# Patient Record
Sex: FEMALE | Race: WHITE | Employment: OTHER | URBAN - METROPOLITAN AREA
[De-identification: names, ages, dates, MRNs, and addresses within clinical notes are randomized per-mention and may not be internally consistent; named-entity substitution may affect disease eponyms.]

---

## 2017-09-28 ENCOUNTER — OFFICE VISIT (OUTPATIENT)
Dept: DERMATOLOGY | Age: 77
End: 2017-09-28

## 2017-09-28 ENCOUNTER — TELEPHONE (OUTPATIENT)
Dept: DERMATOLOGY | Age: 77
End: 2017-09-28

## 2017-09-28 DIAGNOSIS — Z85.828 HISTORY OF SKIN CANCER: ICD-10-CM

## 2017-09-28 DIAGNOSIS — L57.0 AK (ACTINIC KERATOSIS): ICD-10-CM

## 2017-09-28 DIAGNOSIS — L82.1 SEBORRHEIC KERATOSIS: ICD-10-CM

## 2017-09-28 DIAGNOSIS — D48.5 NEOPLASM OF UNCERTAIN BEHAVIOR OF SKIN: ICD-10-CM

## 2017-09-28 DIAGNOSIS — D22.9 MULTIPLE NEVI: Primary | ICD-10-CM

## 2017-09-28 PROCEDURE — 99213 OFFICE O/P EST LOW 20 MIN: CPT | Performed by: DERMATOLOGY

## 2017-10-19 ENCOUNTER — OFFICE VISIT (OUTPATIENT)
Dept: DERMATOLOGY | Age: 77
End: 2017-10-19

## 2017-10-19 DIAGNOSIS — L57.0 AK (ACTINIC KERATOSIS): Primary | ICD-10-CM

## 2017-10-19 DIAGNOSIS — D48.5 NEOPLASM OF UNCERTAIN BEHAVIOR OF SKIN: ICD-10-CM

## 2017-10-19 PROCEDURE — 1036F TOBACCO NON-USER: CPT | Performed by: DERMATOLOGY

## 2017-10-19 PROCEDURE — 17000 DESTRUCT PREMALG LESION: CPT | Performed by: DERMATOLOGY

## 2017-10-19 PROCEDURE — 11100 PR BIOPSY OF SKIN LESION: CPT | Performed by: DERMATOLOGY

## 2017-10-19 NOTE — PROGRESS NOTES
[facility]  Gabo Lee MD  [phone]      Da Trinidad  1940    77 y.o. female     Date of Visit: 10/19/2017    Chief Complaint: AK's, lesions  Chief Complaint   Patient presents with    Biopsy     lt jawline     Last seen:     History of Present Illness:    Had FSE at last visit  for Hx of NMSC ([address] on the L medial cheek taken off with Mohs). 1. Here for trx of a rough lesion on the R nasal sidewall. Asx.     2. Also here for bx of an enlarging papule on the L jawline. Neither of these was treated or biopsied at last visit because she was feeling poorly at the time. Bx's ():  Chest-actinic keratosis  Lt post thigh-benign    Derm Hx:  Hx of intermittent eyelid pruritus and scaling, previously diagnosed as dermatitis - cleared with eliveronica in the past.     Friend of Amina Aly and   Nikki Riley. Her grandson was killed in a car wreck in  ( football player). Review of Systems:  Gen: Feels well, good sense of health. Skin: No changing moles or lesions. No new rashes. Neuro: has a migraine today    Past Medical History, Family History, Surgical History, Medications and Allergies reviewed. Past Medical History:   Diagnosis Date    Back pain     Headache     Hypothyroidism     Migraines     Thyroid disease     UTI (lower urinary tract infection)     treated w/Bactrim per PT.         Past Surgical History:   Procedure Laterality Date    BLADDER SURGERY  2016    restructured bladder        Outpatient Prescriptions Marked as Taking for the 10/19/17 encounter (Office Visit) with Terry Matute MD   Medication Sig Dispense Refill    ketorolac (TORADOL) 30 MG/ML injection       venlafaxine (EFFEXOR-XR) 150 MG XR capsule Take 225 mg by mouth daily Indications: Per PT 3/14/2016      Diclofenac Potassium (CAMBIA PO) Take by mouth      SYNTHROID 112 MCG tablet       trimethoprim (TRIMPEX) 100 MG tablet       ALPRAZolam (XANAX) 0.5 MG tablet Take 0.5 mg by mouth nightly as needed.  Calcium Carbonate-Vit D-Min 600-200 MG-UNIT TABS Take 2,000 Units by mouth. No Known Allergies      Physical Examination     Gen, well-appearing  The following were examined and determined to be normal: Psych/Neuro  The following were examined and determined to be abnormal: face    R nasal sidewall with erythematous roughly scaled macule   L jawline with lobulated telangiectatic yellowish papule     Assessment and Plan     1. AK - R nasal sidewall  - 1 lesion(s) on the nose treated with liquid nitrogen x 2 cycles. Patient educated on risk of blister, hypopigmentation/scar and wound care. 2. R/o BCC vs [address] - L jawline, near the chin  - Shave biopsy performed after verbal consent obtained. Patient educated regarding risk of bleeding, infection, scar and educated on wound care. Skin cleansed with alcohol pad and site anesthetized with lido + epi. Aluminum chloride applied to site for hemostasis. Petrolatum ointment and bandage applied. Specimen bottle labeled with patient information and site and specimen sent to dermpath. F/u yearly.

## 2017-10-19 NOTE — PATIENT INSTRUCTIONS
Cryosurgery (Freezing) Wound Care Instructions    AFTER THE PROCEDURE:    You will notice swelling and redness around the site. This is normal.    You may experience a sharp or sore feeling for the next several days. For this discomfort, you may take acetaminophen (Tylenol©).  A blister may develop at the treated area, sometimes as soon as by the end of the day. After several days, the blister will subside and a scab will form.  If the area is bumped or traumatized during the first few days following freezing, you may develop bleeding into the blister, forming a blood blister. This is nothing to be alarmed about.  If the blister is tense, uncomfortable, or much larger than the site that was frozen, you may pop the blister along its edge with a sterile needle (boiled, heated under a flame, or cleaned with alcohol) to allow the fluid to drain out. If the blister does not bother you, no treatment is needed.  Do NOT peel off the top of the blister roof. It will act as a dressing on top of your wound. WOUND CARE:    You may shower or bathe as usual, but avoid scrubbing the areas that have been frozen.  Cleanse the site twice a day with mild soapy water, and then apply a thin film of white petrolatum (Vaseline©).  You do not need to cover the area, but can if you prefer.  Do NOT allow the site to become dry or crusted, or attempt to dry it out with rubbing alcohol or hydrogen peroxide.  Continue this regimen until the area is pink and healed. Depending on the size and location of your cryosurgery site, healing may take 2 to 4 weeks.  The area may continue to be pink for several weeks, and over the next few months may become darker or lighter than the surrounding skin. This may be a permanent change. Biopsy Wound Care Instructions    · Keep the bandage in place for 24 hours. · Cleanse the wound with mild soapy water daily   Gently dry the area.    Apply Vaseline or petroleum jelly to the wound using a cotton tipped applicator.  Cover with a clean bandage.  Repeat this process until the biopsy site is healed.  If you had stitches placed, continue treating the site until the stitches are removed. Remember to make an appointment to return to have your stitches removed by our staff.  You may shower and bathe as usual.       ** Biopsy results generally take around 7 business days to come back. If you have not heard from us by then, please call the office at [phone] between 8AM and 4PM Monday through Friday.

## 2017-10-25 ENCOUNTER — TELEPHONE (OUTPATIENT)
Dept: DERMATOLOGY | Age: 77
End: 2017-10-25

## 2018-06-21 ENCOUNTER — OFFICE VISIT (OUTPATIENT)
Dept: DERMATOLOGY | Age: 78
End: 2018-06-21

## 2018-06-21 DIAGNOSIS — D48.5 NEOPLASM OF UNCERTAIN BEHAVIOR OF SKIN: ICD-10-CM

## 2018-06-21 DIAGNOSIS — D22.9 MULTIPLE NEVI: Primary | ICD-10-CM

## 2018-06-21 DIAGNOSIS — Z85.828 HISTORY OF NONMELANOMA SKIN CANCER: ICD-10-CM

## 2018-06-21 DIAGNOSIS — L57.0 AK (ACTINIC KERATOSIS): ICD-10-CM

## 2018-06-21 DIAGNOSIS — L82.1 SEBORRHEIC KERATOSIS: ICD-10-CM

## 2018-06-21 PROCEDURE — G8421 BMI NOT CALCULATED: HCPCS | Performed by: DERMATOLOGY

## 2018-06-21 PROCEDURE — G8427 DOCREV CUR MEDS BY ELIG CLIN: HCPCS | Performed by: DERMATOLOGY

## 2018-06-21 PROCEDURE — 4040F PNEUMOC VAC/ADMIN/RCVD: CPT | Performed by: DERMATOLOGY

## 2018-06-21 PROCEDURE — 11100 PR BIOPSY OF SKIN LESION: CPT | Performed by: DERMATOLOGY

## 2018-06-21 PROCEDURE — 1036F TOBACCO NON-USER: CPT | Performed by: DERMATOLOGY

## 2018-06-21 PROCEDURE — 1090F PRES/ABSN URINE INCON ASSESS: CPT | Performed by: DERMATOLOGY

## 2018-06-21 PROCEDURE — 99213 OFFICE O/P EST LOW 20 MIN: CPT | Performed by: DERMATOLOGY

## 2018-06-21 PROCEDURE — 1123F ACP DISCUSS/DSCN MKR DOCD: CPT | Performed by: DERMATOLOGY

## 2018-06-21 PROCEDURE — 17000 DESTRUCT PREMALG LESION: CPT | Performed by: DERMATOLOGY

## 2018-06-21 PROCEDURE — G8400 PT W/DXA NO RESULTS DOC: HCPCS | Performed by: DERMATOLOGY

## 2018-06-28 ENCOUNTER — TELEPHONE (OUTPATIENT)
Dept: DERMATOLOGY | Age: 78
End: 2018-06-28

## 2018-11-27 ENCOUNTER — TELEPHONE (OUTPATIENT)
Dept: DERMATOLOGY | Age: 78
End: 2018-11-27

## 2018-12-18 ENCOUNTER — OFFICE VISIT (OUTPATIENT)
Dept: DERMATOLOGY | Age: 78
End: 2018-12-18
Payer: MEDICARE

## 2018-12-18 DIAGNOSIS — D48.5 NEOPLASM OF UNCERTAIN BEHAVIOR OF SKIN: Primary | ICD-10-CM

## 2018-12-18 PROCEDURE — 11100 PR BIOPSY OF SKIN LESION: CPT | Performed by: DERMATOLOGY

## 2018-12-18 PROCEDURE — 11101 PR BIOPSY, EACH ADDED LESION: CPT | Performed by: DERMATOLOGY

## 2018-12-18 NOTE — PROGRESS NOTES
[facility]  Myrtle Mcelroy MD  [phone]      Errussel Jobs  1940    69 y.o. female     Date of Visit: 2018    Chief Complaint: lesions  Chief Complaint   Patient presents with    Skin Lesion     rt jawline x 2 and lt preauricular     Last seen:     History of Present Illness:    Here for a few new concerning lesions that have grown over the past few mos. R jawline x 2 - anterior lesion is tender  L preauricular - focally tender as well    Hx of NMSC (BCC on the L medial cheek taken off with Mohs)     Bx's ():  Chest-actinic keratosis  Lt post thigh-benign    Derm Hx:  Hx of intermittent eyelid pruritus and scaling, previously diagnosed as dermatitis - cleared with elidel in the past.     Friend of Faith Sharma and   Primitivo Haskins. Her grandson was killed in a car wreck in  ( football player). Review of Systems:  Gen: Feels well, good sense of health. Past Medical History, Family History, Surgical History, Medications and Allergies reviewed. Past Medical History:   Diagnosis Date    Back pain     Headache     Hypothyroidism     Migraines     Thyroid disease     UTI (lower urinary tract infection)     treated w/Bactrim per PT. Past Surgical History:   Procedure Laterality Date    BLADDER SURGERY  2016    restructured bladder        Outpatient Prescriptions Marked as Taking for the 18 encounter (Office Visit) with Nikki Almanza MD   Medication Sig Dispense Refill    ketorolac (TORADOL) 30 MG/ML injection       venlafaxine (EFFEXOR-XR) 150 MG XR capsule Take 225 mg by mouth daily Indications: Per PT 3/14/2016      Diclofenac Potassium (CAMBIA PO) Take by mouth      SYNTHROID 112 MCG tablet       trimethoprim (TRIMPEX) 100 MG tablet       ALPRAZolam (XANAX) 0.5 MG tablet Take 0.5 mg by mouth nightly as needed.  Calcium Carbonate-Vit D-Min 600-200 MG-UNIT TABS Take 2,000 Units by mouth.

## 2018-12-26 DIAGNOSIS — C44.92 SCC (SQUAMOUS CELL CARCINOMA): Primary | ICD-10-CM

## 2019-01-10 ENCOUNTER — PROCEDURE VISIT (OUTPATIENT)
Dept: SURGERY | Age: 79
End: 2019-01-10
Payer: MEDICARE

## 2019-01-10 VITALS
WEIGHT: 190 LBS | OXYGEN SATURATION: 96 % | TEMPERATURE: 97.9 F | SYSTOLIC BLOOD PRESSURE: 132 MMHG | BODY MASS INDEX: 32.61 KG/M2 | DIASTOLIC BLOOD PRESSURE: 85 MMHG | HEART RATE: 78 BPM

## 2019-01-10 DIAGNOSIS — C44.329 SQUAMOUS CELL CANCER OF SKIN OF JAWLINE: Primary | ICD-10-CM

## 2019-01-10 PROCEDURE — 17311 MOHS 1 STAGE H/N/HF/G: CPT | Performed by: DERMATOLOGY

## 2019-01-10 PROCEDURE — 12052 INTMD RPR FACE/MM 2.6-5.0 CM: CPT | Performed by: DERMATOLOGY

## 2019-01-11 ENCOUNTER — TELEPHONE (OUTPATIENT)
Dept: SURGERY | Age: 79
End: 2019-01-11

## 2019-03-25 LAB — DERMATOLOGY PATHOLOGY REPORT: ABNORMAL

## 2019-06-20 ENCOUNTER — OFFICE VISIT (OUTPATIENT)
Dept: DERMATOLOGY | Age: 79
End: 2019-06-20
Payer: MEDICARE

## 2019-06-20 DIAGNOSIS — L82.0 INFLAMED SEBORRHEIC KERATOSIS: ICD-10-CM

## 2019-06-20 DIAGNOSIS — Z85.828 HISTORY OF NONMELANOMA SKIN CANCER: ICD-10-CM

## 2019-06-20 DIAGNOSIS — L82.1 SEBORRHEIC KERATOSIS: ICD-10-CM

## 2019-06-20 DIAGNOSIS — D22.9 MULTIPLE NEVI: Primary | ICD-10-CM

## 2019-06-20 DIAGNOSIS — L57.0 AK (ACTINIC KERATOSIS): ICD-10-CM

## 2019-06-20 PROCEDURE — 1036F TOBACCO NON-USER: CPT | Performed by: DERMATOLOGY

## 2019-06-20 PROCEDURE — 1123F ACP DISCUSS/DSCN MKR DOCD: CPT | Performed by: DERMATOLOGY

## 2019-06-20 PROCEDURE — G8400 PT W/DXA NO RESULTS DOC: HCPCS | Performed by: DERMATOLOGY

## 2019-06-20 PROCEDURE — G8427 DOCREV CUR MEDS BY ELIG CLIN: HCPCS | Performed by: DERMATOLOGY

## 2019-06-20 PROCEDURE — 99213 OFFICE O/P EST LOW 20 MIN: CPT | Performed by: DERMATOLOGY

## 2019-06-20 PROCEDURE — 4040F PNEUMOC VAC/ADMIN/RCVD: CPT | Performed by: DERMATOLOGY

## 2019-06-20 PROCEDURE — G8419 CALC BMI OUT NRM PARAM NOF/U: HCPCS | Performed by: DERMATOLOGY

## 2019-06-20 PROCEDURE — 1090F PRES/ABSN URINE INCON ASSESS: CPT | Performed by: DERMATOLOGY

## 2019-06-20 PROCEDURE — 17110 DESTRUCTION B9 LES UP TO 14: CPT | Performed by: DERMATOLOGY

## 2019-06-20 RX ORDER — CALCIUM CARBONATE 500(1250)
500 TABLET ORAL DAILY
COMMUNITY

## 2019-06-20 RX ORDER — CHLORAL HYDRATE 500 MG
3000 CAPSULE ORAL 3 TIMES DAILY
COMMUNITY

## 2019-10-21 ENCOUNTER — OFFICE VISIT (OUTPATIENT)
Dept: DERMATOLOGY | Age: 79
End: 2019-10-21
Payer: MEDICARE

## 2019-10-21 DIAGNOSIS — L82.0 INFLAMED SEBORRHEIC KERATOSIS: Primary | ICD-10-CM

## 2019-10-21 PROCEDURE — 17111 DESTRUCTION B9 LESIONS 15/>: CPT | Performed by: DERMATOLOGY

## 2019-10-21 PROCEDURE — 11306 SHAVE SKIN LESION 0.6-1.0 CM: CPT | Performed by: DERMATOLOGY

## 2019-10-21 PROCEDURE — 11312 SHAVE SKIN LESION 1.1-2.0 CM: CPT | Performed by: DERMATOLOGY

## 2019-10-23 LAB — DERMATOLOGY PATHOLOGY REPORT: NORMAL

## 2020-03-13 ENCOUNTER — TELEPHONE (OUTPATIENT)
Dept: DERMATOLOGY | Age: 80
End: 2020-03-13

## 2020-03-17 NOTE — TELEPHONE ENCOUNTER
Spoke to patient-started little, but has increased in size. The lesion was explained to me the half size of a dime, skin tone, not bleeding or painful. Patient has an appointment in June 2020.

## 2020-07-13 ENCOUNTER — OFFICE VISIT (OUTPATIENT)
Dept: DERMATOLOGY | Age: 80
End: 2020-07-13
Payer: MEDICARE

## 2020-07-13 VITALS — TEMPERATURE: 97.5 F

## 2020-07-13 PROCEDURE — 1123F ACP DISCUSS/DSCN MKR DOCD: CPT | Performed by: DERMATOLOGY

## 2020-07-13 PROCEDURE — 1036F TOBACCO NON-USER: CPT | Performed by: DERMATOLOGY

## 2020-07-13 PROCEDURE — 4040F PNEUMOC VAC/ADMIN/RCVD: CPT | Performed by: DERMATOLOGY

## 2020-07-13 PROCEDURE — 1090F PRES/ABSN URINE INCON ASSESS: CPT | Performed by: DERMATOLOGY

## 2020-07-13 PROCEDURE — G8421 BMI NOT CALCULATED: HCPCS | Performed by: DERMATOLOGY

## 2020-07-13 PROCEDURE — G8427 DOCREV CUR MEDS BY ELIG CLIN: HCPCS | Performed by: DERMATOLOGY

## 2020-07-13 PROCEDURE — 99213 OFFICE O/P EST LOW 20 MIN: CPT | Performed by: DERMATOLOGY

## 2020-07-13 PROCEDURE — G8400 PT W/DXA NO RESULTS DOC: HCPCS | Performed by: DERMATOLOGY

## 2020-07-13 PROCEDURE — 17110 DESTRUCTION B9 LES UP TO 14: CPT | Performed by: DERMATOLOGY

## 2020-07-13 PROCEDURE — 11102 TANGNTL BX SKIN SINGLE LES: CPT | Performed by: DERMATOLOGY

## 2020-07-13 NOTE — PROGRESS NOTES
[facility]  Whitney Flynn MD  [phone]      [place]  1940    67 y.o. female     Date of Visit: 2020    Chief Complaint: f/u skin cancer, AK's, lesions  Chief Complaint   Patient presents with    Skin Exam     spot on upper chest left side of neck     Last seen:     History of Present Illness:    1. She is here for evaluation of multiple asx pigmented lesions on the trunk and extremities, present for many years; no change in size/shape/color of any lesions; no bleeding lesions. Firm lesion on the shoulder x many years - asx. 2. Hx of NMSC (BCC on the L medial cheek treated with Mohs and more recently SCC on the R jawline that was treated with Mohs by Dr. Amy Ortega ) - Here for f/u and skin check. No concerns with previous sites or new lesions noted except for noted below. 3. Hx of AK. No new concerns. 4. C/o irritated and itchy lesion on the  L neck. 5. She has a concerning pink lesion on the R upper FH - bled a few weeks ago. Asx. Bx's ():  Chest-actinic keratosis  Lt post thigh-benign    Derm Hx:  Hx of intermittent eyelid pruritus and scaling, previously diagnosed as dermatitis - cleared with eliveronica in the past.     Friend of Mirian Gee and   [place]. Her grandson was killed in a car wreck in  ( football player). Review of Systems:  Gen: Feels well, good sense of health. Skin: No changing moles or lesions. No new rashes. Past Medical History, Family History, Surgical History, Medications and Allergies reviewed. Past Medical History:   Diagnosis Date    Back pain     Cancer ([facility] [address]     squamous cell carcinoma and basal cell carcinoma    Headache     Hypothyroidism     Migraines     Thyroid disease     UTI (lower urinary tract infection)     treated w/Bactrim per PT.         Past Surgical History:   Procedure Laterality Date    BLADDER SURGERY  2016    restructured bladder     MOHS SURGERY  01/10/2019    right jawline anterior inferior       Outpatient Medications Marked as Taking for the 7/13/20 encounter (Office Visit) with Kevin De La Cruz MD   Medication Sig Dispense Refill    aspirin 81 MG tablet Take 81 mg by mouth daily      ketorolac (TORADOL) 30 MG/ML injection       venlafaxine (EFFEXOR-XR) 150 MG XR capsule Take 225 mg by mouth daily Indications: Per PT 3/14/2016      SYNTHROID 112 MCG tablet       Calcium Carbonate-Vit D-Min 600-200 MG-UNIT TABS Take 2,000 Units by mouth. No Known Allergies      Physical Examination     Gen, well-appearing  The following were examined and determined to be normal: Psych/Neuro, Scalp/hair, Conjunctivae/eyelids, Gums/teeth/lips, Abdomen, LLE, Nails/digits and Groin/buttocks. Neck, Breast/axilla/chest, and LUE and RUE.  RLE, back  The following were examined and determined to be abnormal: face    trunk and extremities with scattered brown macules and papules   Trunk, arms with stuck-on dull-surfaced pinkish-tan crusted papules  Scar appears clear  No AK's  Neck with stuck-on dull-surfaced brown papule  R upper lateral FH with shiny pinkish and tan sclerotic macule  L shoulder with firm dermal pinkish-brown papule       Assessment and Plan     1. Benign-appearing nevi and SK's and DF on the L shoulder  - educ re ABCD's of MM   educ sun protection   encouraged skin check yearly (sooner if indicated), self checks     2. Hx of NMSC, no signs recurrence  - educ sun protection   encouraged skin check yearly (sooner if indicated), self checks    3. Hx of AK - clear today  - sun proection    4. ISK's - 1 on the L neck  -1 lesion on the hairline - lesion(s) on the frontal hairline treated with liquid nitrogen x 2 cycles. Patient educated on risk of blister, hypopigmentation/scar and wound care.    - rtn for removal in the fall/winter - neck and infamammary - LN2 vs shave/ED    5. R/o BCC - R upper lateral FH  - Shave biopsy performed after verbal consent obtained. Patient educated regarding risk of bleeding, infection, scar and educated on wound care. Skin cleansed with alcohol pad and site anesthetized with lido + epi. Aluminum chloride applied to site for hemostasis. Petrolatum ointment and bandage applied. Specimen bottle labeled with patient information and site and specimen sent to dermpath. F/u yearly.

## 2020-07-15 LAB — DERMATOLOGY PATHOLOGY REPORT: NORMAL

## 2020-10-27 LAB — SARS-COV-2: NORMAL

## 2020-10-31 ENCOUNTER — HOSPITAL ENCOUNTER (INPATIENT)
Age: 80
LOS: 13 days | Discharge: HOME HEALTH | DRG: 871 | End: 2020-11-13
Attending: STUDENT IN AN ORGANIZED HEALTH CARE EDUCATION/TRAINING PROGRAM | Admitting: INTERNAL MEDICINE
Payer: MEDICARE

## 2020-10-31 ENCOUNTER — APPOINTMENT (OUTPATIENT)
Dept: GENERAL RADIOLOGY | Age: 80
DRG: 871 | End: 2020-10-31
Payer: MEDICARE

## 2020-10-31 PROBLEM — U07.1 COVID-19: Status: ACTIVE | Noted: 2020-10-31

## 2020-10-31 PROBLEM — E03.9 HYPOTHYROID: Status: ACTIVE | Noted: 2020-10-31

## 2020-10-31 PROBLEM — J96.00 ACUTE RESPIRATORY FAILURE DUE TO COVID-19 (HCC): Status: ACTIVE | Noted: 2020-10-31

## 2020-10-31 PROBLEM — F32.A DEPRESSION: Status: ACTIVE | Noted: 2020-10-31

## 2020-10-31 PROBLEM — U07.1 ACUTE RESPIRATORY FAILURE DUE TO COVID-19 (HCC): Status: ACTIVE | Noted: 2020-10-31

## 2020-10-31 LAB
ANION GAP SERPL CALCULATED.3IONS-SCNC: 14 MMOL/L (ref 3–16)
APTT: 28.6 SEC (ref 24.2–36.2)
BASOPHILS ABSOLUTE: 0 K/UL (ref 0–0.2)
BASOPHILS RELATIVE PERCENT: 0.2 %
BILIRUBIN URINE: NEGATIVE
BLOOD, URINE: NEGATIVE
BUN BLDV-MCNC: 10 MG/DL (ref 7–20)
CALCIUM SERPL-MCNC: 8.3 MG/DL (ref 8.3–10.6)
CHLORIDE BLD-SCNC: 101 MMOL/L (ref 99–110)
CLARITY: CLEAR
CO2: 23 MMOL/L (ref 21–32)
COLOR: YELLOW
CREAT SERPL-MCNC: 0.5 MG/DL (ref 0.6–1.2)
D DIMER: 415 NG/ML DDU (ref 0–229)
EOSINOPHILS ABSOLUTE: 0 K/UL (ref 0–0.6)
EOSINOPHILS RELATIVE PERCENT: 0.2 %
EPITHELIAL CELLS, UA: 6 /HPF (ref 0–5)
GFR AFRICAN AMERICAN: >60
GFR NON-AFRICAN AMERICAN: >60
GLUCOSE BLD-MCNC: 126 MG/DL (ref 70–99)
GLUCOSE URINE: NEGATIVE MG/DL
HCT VFR BLD CALC: 41.2 % (ref 36–48)
HEMOGLOBIN: 14 G/DL (ref 12–16)
HYALINE CASTS: 2 /LPF (ref 0–8)
INR BLD: 1.1 (ref 0.86–1.14)
KETONES, URINE: 40 MG/DL
LACTIC ACID: 0.9 MMOL/L (ref 0.4–2)
LEUKOCYTE ESTERASE, URINE: NEGATIVE
LYMPHOCYTES ABSOLUTE: 0.7 K/UL (ref 1–5.1)
LYMPHOCYTES RELATIVE PERCENT: 14.2 %
MCH RBC QN AUTO: 30.6 PG (ref 26–34)
MCHC RBC AUTO-ENTMCNC: 33.9 G/DL (ref 31–36)
MCV RBC AUTO: 90.2 FL (ref 80–100)
MICROSCOPIC EXAMINATION: YES
MONOCYTES ABSOLUTE: 0.3 K/UL (ref 0–1.3)
MONOCYTES RELATIVE PERCENT: 5.9 %
NEUTROPHILS ABSOLUTE: 3.8 K/UL (ref 1.7–7.7)
NEUTROPHILS RELATIVE PERCENT: 79.5 %
NITRITE, URINE: NEGATIVE
PDW BLD-RTO: 13.3 % (ref 12.4–15.4)
PH UA: 6.5 (ref 5–8)
PLATELET # BLD: 170 K/UL (ref 135–450)
PMV BLD AUTO: 7.4 FL (ref 5–10.5)
POTASSIUM SERPL-SCNC: 3.4 MMOL/L (ref 3.5–5.1)
PRO-BNP: 175 PG/ML (ref 0–449)
PROTEIN UA: ABNORMAL MG/DL
PROTHROMBIN TIME: 12.8 SEC (ref 10–13.2)
RAPID INFLUENZA  B AGN: NEGATIVE
RAPID INFLUENZA A AGN: NEGATIVE
RBC # BLD: 4.57 M/UL (ref 4–5.2)
RBC UA: 2 /HPF (ref 0–4)
SODIUM BLD-SCNC: 138 MMOL/L (ref 136–145)
SPECIFIC GRAVITY UA: 1.01 (ref 1–1.03)
TROPONIN: <0.01 NG/ML
URINE REFLEX TO CULTURE: ABNORMAL
URINE TYPE: ABNORMAL
UROBILINOGEN, URINE: 0.2 E.U./DL
WBC # BLD: 4.8 K/UL (ref 4–11)
WBC UA: 2 /HPF (ref 0–5)

## 2020-10-31 PROCEDURE — 36415 COLL VENOUS BLD VENIPUNCTURE: CPT

## 2020-10-31 PROCEDURE — 83880 ASSAY OF NATRIURETIC PEPTIDE: CPT

## 2020-10-31 PROCEDURE — 6360000002 HC RX W HCPCS: Performed by: NURSE PRACTITIONER

## 2020-10-31 PROCEDURE — 6370000000 HC RX 637 (ALT 250 FOR IP): Performed by: INTERNAL MEDICINE

## 2020-10-31 PROCEDURE — 1200000000 HC SEMI PRIVATE

## 2020-10-31 PROCEDURE — 80048 BASIC METABOLIC PNL TOTAL CA: CPT

## 2020-10-31 PROCEDURE — 83605 ASSAY OF LACTIC ACID: CPT

## 2020-10-31 PROCEDURE — 87040 BLOOD CULTURE FOR BACTERIA: CPT

## 2020-10-31 PROCEDURE — 85379 FIBRIN DEGRADATION QUANT: CPT

## 2020-10-31 PROCEDURE — 94640 AIRWAY INHALATION TREATMENT: CPT

## 2020-10-31 PROCEDURE — 2700000000 HC OXYGEN THERAPY PER DAY

## 2020-10-31 PROCEDURE — 71045 X-RAY EXAM CHEST 1 VIEW: CPT

## 2020-10-31 PROCEDURE — 84484 ASSAY OF TROPONIN QUANT: CPT

## 2020-10-31 PROCEDURE — 2580000003 HC RX 258: Performed by: NURSE PRACTITIONER

## 2020-10-31 PROCEDURE — 99285 EMERGENCY DEPT VISIT HI MDM: CPT

## 2020-10-31 PROCEDURE — 85610 PROTHROMBIN TIME: CPT

## 2020-10-31 PROCEDURE — 6370000000 HC RX 637 (ALT 250 FOR IP): Performed by: NURSE PRACTITIONER

## 2020-10-31 PROCEDURE — 87804 INFLUENZA ASSAY W/OPTIC: CPT

## 2020-10-31 PROCEDURE — 81001 URINALYSIS AUTO W/SCOPE: CPT

## 2020-10-31 PROCEDURE — 2580000003 HC RX 258: Performed by: INTERNAL MEDICINE

## 2020-10-31 PROCEDURE — 85025 COMPLETE CBC W/AUTO DIFF WBC: CPT

## 2020-10-31 PROCEDURE — 93005 ELECTROCARDIOGRAM TRACING: CPT | Performed by: STUDENT IN AN ORGANIZED HEALTH CARE EDUCATION/TRAINING PROGRAM

## 2020-10-31 PROCEDURE — 96374 THER/PROPH/DIAG INJ IV PUSH: CPT

## 2020-10-31 PROCEDURE — 85730 THROMBOPLASTIN TIME PARTIAL: CPT

## 2020-10-31 PROCEDURE — 94761 N-INVAS EAR/PLS OXIMETRY MLT: CPT

## 2020-10-31 RX ORDER — CHLORAL HYDRATE 500 MG
3000 CAPSULE ORAL 3 TIMES DAILY
Status: DISCONTINUED | OUTPATIENT
Start: 2020-10-31 | End: 2020-10-31

## 2020-10-31 RX ORDER — ALPRAZOLAM 0.5 MG/1
0.5 TABLET ORAL NIGHTLY PRN
Status: DISCONTINUED | OUTPATIENT
Start: 2020-10-31 | End: 2020-11-13 | Stop reason: HOSPADM

## 2020-10-31 RX ORDER — ALBUTEROL SULFATE 90 UG/1
2 AEROSOL, METERED RESPIRATORY (INHALATION) 4 TIMES DAILY
Status: DISCONTINUED | OUTPATIENT
Start: 2020-11-01 | End: 2020-11-13 | Stop reason: HOSPADM

## 2020-10-31 RX ORDER — DEXAMETHASONE SODIUM PHOSPHATE 10 MG/ML
6 INJECTION, SOLUTION INTRAMUSCULAR; INTRAVENOUS EVERY 24 HOURS
Status: DISCONTINUED | OUTPATIENT
Start: 2020-11-01 | End: 2020-11-01

## 2020-10-31 RX ORDER — LEVOTHYROXINE SODIUM 112 UG/1
112 TABLET ORAL DAILY
Status: DISCONTINUED | OUTPATIENT
Start: 2020-11-01 | End: 2020-11-13 | Stop reason: HOSPADM

## 2020-10-31 RX ORDER — POTASSIUM CHLORIDE 20 MEQ/1
40 TABLET, EXTENDED RELEASE ORAL PRN
Status: DISCONTINUED | OUTPATIENT
Start: 2020-10-31 | End: 2020-11-13 | Stop reason: HOSPADM

## 2020-10-31 RX ORDER — DEXAMETHASONE SODIUM PHOSPHATE 10 MG/ML
10 INJECTION, SOLUTION INTRAMUSCULAR; INTRAVENOUS ONCE
Status: COMPLETED | OUTPATIENT
Start: 2020-10-31 | End: 2020-10-31

## 2020-10-31 RX ORDER — POTASSIUM CHLORIDE 7.45 MG/ML
10 INJECTION INTRAVENOUS PRN
Status: DISCONTINUED | OUTPATIENT
Start: 2020-10-31 | End: 2020-11-13 | Stop reason: HOSPADM

## 2020-10-31 RX ORDER — PROMETHAZINE HYDROCHLORIDE 25 MG/1
12.5 TABLET ORAL EVERY 6 HOURS PRN
Status: DISCONTINUED | OUTPATIENT
Start: 2020-10-31 | End: 2020-11-13 | Stop reason: HOSPADM

## 2020-10-31 RX ORDER — ALBUTEROL SULFATE 90 UG/1
2 AEROSOL, METERED RESPIRATORY (INHALATION) EVERY 6 HOURS PRN
Status: DISCONTINUED | OUTPATIENT
Start: 2020-10-31 | End: 2020-11-13 | Stop reason: HOSPADM

## 2020-10-31 RX ORDER — SODIUM CHLORIDE 9 MG/ML
INJECTION, SOLUTION INTRAVENOUS CONTINUOUS
Status: DISCONTINUED | OUTPATIENT
Start: 2020-10-31 | End: 2020-11-01

## 2020-10-31 RX ORDER — ONDANSETRON 2 MG/ML
4 INJECTION INTRAMUSCULAR; INTRAVENOUS EVERY 6 HOURS PRN
Status: DISCONTINUED | OUTPATIENT
Start: 2020-10-31 | End: 2020-11-13 | Stop reason: HOSPADM

## 2020-10-31 RX ORDER — ALBUTEROL SULFATE 90 UG/1
2 AEROSOL, METERED RESPIRATORY (INHALATION) 4 TIMES DAILY
Status: DISCONTINUED | OUTPATIENT
Start: 2020-10-31 | End: 2020-10-31

## 2020-10-31 RX ORDER — ACETAMINOPHEN 325 MG/1
650 TABLET ORAL EVERY 6 HOURS PRN
Status: DISCONTINUED | OUTPATIENT
Start: 2020-10-31 | End: 2020-11-13 | Stop reason: HOSPADM

## 2020-10-31 RX ORDER — IPRATROPIUM BROMIDE AND ALBUTEROL SULFATE 2.5; .5 MG/3ML; MG/3ML
1 SOLUTION RESPIRATORY (INHALATION)
Status: DISCONTINUED | OUTPATIENT
Start: 2020-11-01 | End: 2020-10-31 | Stop reason: ALTCHOICE

## 2020-10-31 RX ORDER — 0.9 % SODIUM CHLORIDE 0.9 %
1000 INTRAVENOUS SOLUTION INTRAVENOUS ONCE
Status: COMPLETED | OUTPATIENT
Start: 2020-10-31 | End: 2020-10-31

## 2020-10-31 RX ORDER — SODIUM CHLORIDE 0.9 % (FLUSH) 0.9 %
10 SYRINGE (ML) INJECTION EVERY 12 HOURS SCHEDULED
Status: DISCONTINUED | OUTPATIENT
Start: 2020-10-31 | End: 2020-11-13 | Stop reason: HOSPADM

## 2020-10-31 RX ORDER — SODIUM CHLORIDE 0.9 % (FLUSH) 0.9 %
10 SYRINGE (ML) INJECTION PRN
Status: DISCONTINUED | OUTPATIENT
Start: 2020-10-31 | End: 2020-11-13 | Stop reason: HOSPADM

## 2020-10-31 RX ORDER — ASPIRIN 81 MG/1
81 TABLET ORAL DAILY
Status: DISCONTINUED | OUTPATIENT
Start: 2020-11-01 | End: 2020-11-13 | Stop reason: HOSPADM

## 2020-10-31 RX ORDER — CALCIUM CARBONATE 500(1250)
500 TABLET ORAL DAILY
Status: DISCONTINUED | OUTPATIENT
Start: 2020-11-01 | End: 2020-11-13 | Stop reason: HOSPADM

## 2020-10-31 RX ORDER — 0.9 % SODIUM CHLORIDE 0.9 %
500 INTRAVENOUS SOLUTION INTRAVENOUS PRN
Status: DISCONTINUED | OUTPATIENT
Start: 2020-10-31 | End: 2020-11-13 | Stop reason: HOSPADM

## 2020-10-31 RX ORDER — ACETAMINOPHEN 325 MG/1
650 TABLET ORAL ONCE
Status: COMPLETED | OUTPATIENT
Start: 2020-10-31 | End: 2020-10-31

## 2020-10-31 RX ORDER — ACETAMINOPHEN 650 MG/1
650 SUPPOSITORY RECTAL EVERY 6 HOURS PRN
Status: DISCONTINUED | OUTPATIENT
Start: 2020-10-31 | End: 2020-11-13 | Stop reason: HOSPADM

## 2020-10-31 RX ORDER — TRIMETHOPRIM 100 MG/1
100 TABLET ORAL DAILY
Status: DISCONTINUED | OUTPATIENT
Start: 2020-11-01 | End: 2020-10-31

## 2020-10-31 RX ORDER — LABETALOL HYDROCHLORIDE 5 MG/ML
10 INJECTION, SOLUTION INTRAVENOUS EVERY 6 HOURS PRN
Status: DISCONTINUED | OUTPATIENT
Start: 2020-10-31 | End: 2020-11-13 | Stop reason: HOSPADM

## 2020-10-31 RX ADMIN — SODIUM CHLORIDE: 9 INJECTION, SOLUTION INTRAVENOUS at 23:48

## 2020-10-31 RX ADMIN — Medication 2 PUFF: at 17:48

## 2020-10-31 RX ADMIN — Medication 1 G: at 17:28

## 2020-10-31 RX ADMIN — Medication 10 ML: at 23:50

## 2020-10-31 RX ADMIN — SODIUM CHLORIDE 1000 ML: 9 INJECTION, SOLUTION INTRAVENOUS at 15:52

## 2020-10-31 RX ADMIN — AZITHROMYCIN MONOHYDRATE 500 MG: 500 INJECTION, POWDER, LYOPHILIZED, FOR SOLUTION INTRAVENOUS at 17:33

## 2020-10-31 RX ADMIN — ACETAMINOPHEN 650 MG: 325 TABLET ORAL at 15:52

## 2020-10-31 RX ADMIN — DEXAMETHASONE SODIUM PHOSPHATE 10 MG: 10 INJECTION, SOLUTION INTRAMUSCULAR; INTRAVENOUS at 15:53

## 2020-10-31 ASSESSMENT — ENCOUNTER SYMPTOMS
EYE PAIN: 0
WHEEZING: 0
VOMITING: 0
NAUSEA: 0
SHORTNESS OF BREATH: 1
BACK PAIN: 0
EYE REDNESS: 0

## 2020-10-31 ASSESSMENT — PAIN SCALES - GENERAL: PAINLEVEL_OUTOF10: 0

## 2020-10-31 NOTE — H&P
History and Physical  Dr. Betts Coma  10/31/2020    PCP: Kunal Wayne MD    Cc:   Chief Complaint   Patient presents with    Shortness of Breath     Pt brought by  EMS from home with CC of SOB that started Tuesday. Pt reports being dx'ed with COVID on Tuesday reports recent admission at [facility]. pt on RA 88%, 94%on 2L. HPI:  Amina Bhatia is a 67 y.o. female who has a past medical history of Back pain, Cancer ([facility] [address] Headache, Hypothyroidism, Migraines, Thyroid disease, and UTI (lower urinary tract infection). Patient presents with Acute respiratory failure due to COVID-19 [facility]). HPI     67 y.o. 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 Patient says she initially started getting sick on 10/11/2020 with having progressive shortness of air. She was admitted to [facility] for 3 days was discharged out without any medications. Patient says she has been attempting to manage her symptoms at home without any relief. Said that she did have a fever of 101 this morning and has not taken any antipyretics 4 hours prior to arrival.  Her daughter did note that she sat up for approximately 45 minutes yesterday and became very fatigued and had to lay down. She has had decreased activity due to her dyspnea on exertion. Says she has had decreased p.o. intake doing to feeling very fatigued and short of air. denies having home O2. She is currently on 4L 02 in ER here. Will need admission for acute resp scarletalure      Old chart reviewed, incl d/c summ per Dr Dariel Lindo from [facility] 10/29/20    Problem list of hospitalization thus far:   Active Hospital Problems    Diagnosis    COVID-19 [U07.1]    Acute respiratory failure due to COVID-19 ([address] [U07.1, J96.00]    Depression [F32.9]    Hypothyroid [E03.9]         [facility] by myself  Prior to Admission medications    Medication Sig Start Date End Date Taking? Authorizing Provider   calcium carbonate (OSCAL) 500 MG TABS tablet Take 500 mg by mouth daily    Historical Provider, MD   Omega-3 Fatty Acids (FISH OIL) 1000 MG CAPS Take 3,000 mg by mouth 3 times daily    Historical Provider, MD   aspirin 81 MG tablet Take 81 mg by mouth daily    Historical Provider, MD   ketorolac (TORADOL) 30 MG/ML injection  9/12/16   Historical Provider, MD   venlafaxine (EFFEXOR-XR) 150 MG XR capsule Take 225 mg by mouth daily Indications: Per PT 3/14/2016    Historical Provider, MD   Diclofenac Potassium (CAMBIA PO) Take by mouth    Historical Provider, MD   SYNTHROID 112 MCG tablet  6/15/15   Historical Provider, MD   trimethoprim (TRIMPEX) 100 MG tablet  7/31/15   Historical Provider, MD   ALPRAZolam Patrice Leong) 0.5 MG tablet Take 0.5 mg by mouth nightly as needed. Historical Provider, MD   Calcium Carbonate-Vit D-Min 600-200 MG-UNIT TABS Take 2,000 Units by mouth.     Historical Provider, MD         No Known Allergies          EXAM: (2-7 system for EPF/Detailed, ?8 for Comprehensive)  BP (!) 161/73   Pulse 79   Temp 101.7 °F (38.7 °C) (Oral)   Resp 24   Ht 5' 4\" (1.626 m)   Wt 170 lb (77.1 kg)   SpO2 93%   BMI 29.18 kg/m²   Constitutional: vitals as above: alert, appears stated age and cooperative  Psychiatric: normal insight and judgment, oriented to person, place, time, and general circumstances  Head: Normocephalic, without obvious abnormality, atraumatic  Eyes:lids and lashes normal, conjunctivae and sclerae normal and pupils equal, round, reactive to light and accomodation  EMNT: external ears normal, lips normal  Neck: no adenopathy, supple, symmetrical, trachea midline and thyroid not enlarged, symmetric, no tenderness/mass/nodules   Respiratory: rales bilat  Cardiovascular: normal rate, regular rhythm, normal S1 and S2 and no carotid bruits  Gastrointestinal: soft, non-tender, non-distended, normal bowel sounds, no masses or organomegaly  Lymphatic:   Extremities: no edema, no clubbing  Skin:No rashes or nodules noted.   Neurologic:    LABS:  Labs Reviewed   BASIC METABOLIC PANEL - Abnormal; Notable for the following components:       Result Value    Potassium 3.4 (*)     Glucose 126 (*)     CREATININE 0.5 (*)     All other components within normal limits    Narrative:     Performed at:  [facility] [address]   Phone [phone]   CBC WITH AUTO DIFFERENTIAL - Abnormal; Notable for the following components:    Lymphocytes Absolute 0.7 (*)     All other components within normal limits    Narrative:     Performed at:  [facility] [address]   Phone [phone]   D-DIMER, QUANTITATIVE - Abnormal; Notable for the following components:    D-Dimer, Quant 415 (*)     All other components within normal limits    Narrative:     Performed at:  [facility] [address]   Phone [phone]   RAPID INFLUENZA A/B ANTIGENS    Narrative:     Performed at:  [facility] [address]   Phone [phone]   CULTURE, BLOOD 2   CULTURE, BLOOD 1   BRAIN NATRIURETIC PEPTIDE    Narrative:     Performed at:  [facility] [address]   Phone [phone]   LACTIC ACID, PLASMA    Narrative:     Performed at:  [facility] [address], [place]   Phone [phone]   TROPONIN    Narrative:     Performed at:  [facility] [address]   Phone [phone]   PROTIME-INR    Narrative:     Performed at:  [facility]  [address] [facility], [address]   Phone [phone]   APTT    Narrative:     Performed at:  [facility]  [address],  [facility], [address]   Phone [phone]   URINE RT REFLEX TO CULTURE         IMAGING:  Imaging results from the ER have been reviewed in the computerized chart. Xr Chest Portable    Result Date: 10/31/2020  EXAMINATION: ONE XRAY VIEW OF THE CHEST 10/31/2020 3:29 pm COMPARISON: None. HISTORY: ORDERING SYSTEM PROVIDED HISTORY: SOB TECHNOLOGIST PROVIDED HISTORY: Reason for exam:->SOB Reason for Exam: COVID+, low O2 sats Acuity: Acute Type of Exam: Initial FINDINGS: The cardiac silhouette is mildly prominent. Multifocal ground-glass opacification, particularly in the perihilar regions and left lung base. There is no pleural fluid. There is sparing of the lung apices. Multifocal ground-glass opacification likely pneumonia including atypical viral pneumonia. Pulmonary edema is a less likely etiology for the findings. EKG: from ER, interpreted by self, normal sinus at 80. No acute st elevation seen. Comparison made to description of ekg in old chart from [facility]:    Principal Problem:    Acute respiratory failure due to COVID-19 [facility]) -New Problem to me. Pt normally not on o2. Now on 4L. Known positive covid test 7d ago  Plan: admit, iv decadron. Supportive tx. Cont o2 as needed  Active Problems:    COVID-19 -Established problem. Uncontrolled. Worsening sx over past few days  Plan: admit, iv decadron. At this time, does not appear to be candidate for convalescent plasma, but will closely monitor    Depression -Established problem. Stable. Plan: Continue present orders/plan. Hypothyroid -Established problem. Stable. Plan: cont snythroid. Diagnoses as listed above, designated as new or established and plan outlined for each. Data Reviewed:   (1) Lab tests were reviewed or ordered.    (1) Radiology tests were reviewed or ordered. (1) Medical test (Echo, EKG, PFT/jono) were ordered. (1)History was not obtained from someone other than patient  (1) Old records  were reviewed - see HPI/MDM for pertinent details if review done. (2) Case wasdiscussed with another health care provider: Dr Marifer Connell, ER  (2) Imaging was viewed by myself. (2) EKG  was viewed by myself. The patient isbeing placed in inpatient status with the expectation of requiring a hospital stay spanning at least two midnights for care and treatment of the problems noted in the problem list.  This determination is also based on thepatients comorbidities and past medical history, the severity and timing of the signs and symptoms upon presentation.         Electronically signed by: Mercedes Cervantes 10/31/2020

## 2020-10-31 NOTE — ED NOTES
Bed: 15  Expected date:   Expected time:   Means of arrival:   Comments:  Medic 212-covid + w/sob     Estuardo White RN  10/31/20 [id number]

## 2020-10-31 NOTE — ED NOTES
Pt placed in prone position. Tolerating well at this time. Denies needs at this time. Pt remains on 5L of oxygen.       Bunny Gustafson RN  10/31/20 [id number]

## 2020-10-31 NOTE — ED NOTES
This RN spoke to patient's daughter with patient's permission. Daughter updated on patient status and plan of care. Contact information for daughter: Lauren Chowdary @ [phone]. Conveys that she would liked to be updated by physician during rounds.       Flaca Stevens RN  10/31/20 5918

## 2020-10-31 NOTE — ED PROVIDER NOTES
I independently performed a history and physical on Arnett Canal. All diagnostic, treatment, and disposition decisions were made by myself in conjunction with the advanced practice provider. Briefly, this is a 67 y.o. female here for malaise, cough, sob for 2 weeks. Pt recently admitted to [facility] for 1 week for hypoxia, shortness of breath. Treated for CAP with rocephin and azithro. Is Covid19 positive. On exam pt is uncomfortable appearing  Cardiac RRR, no murmur  Lungs with crackles at bases, no increased work of breathing, new 4L o2 requirement  Abdomen soft nontender  No calf edema or tenderness        EKG  The Ekg interpreted by me in the absence of a cardiologist shows. normal sinus rhythm with a rate of 80  Axis is   Normal  QTc is  normal  Intervals and Durations are unremarkable. QS complexes inferiorly suggesting prior inferior infarct, poor R wave progression. No prior scans for comparison    XR CHEST PORTABLE   Final Result   Multifocal ground-glass opacification likely pneumonia including atypical   viral pneumonia. Pulmonary edema is a less likely etiology for the findings.            Labs Reviewed   BASIC METABOLIC PANEL - Abnormal; Notable for the following components:       Result Value    Potassium 3.4 (*)     Glucose 126 (*)     CREATININE 0.5 (*)     All other components within normal limits    Narrative:     Performed at:  [facility]  [address], [facility] [employer]   Phone [phone]   CBC WITH AUTO DIFFERENTIAL - Abnormal; Notable for the following components:    Lymphocytes Absolute 0.7 (*)     All other components within normal limits    Narrative:     Performed at:  [facility]  [address], [address]   Phone [phone]   D-DIMER, QUANTITATIVE - Abnormal; Notable for the following components:    D-Dimer, Quant 415 (*)     All other components within normal limits Narrative:     Performed at:  [facility]  [address],  [place], [address]   Phone [phone]   CULTURE, BLOOD 2   CULTURE, BLOOD 1   RAPID INFLUENZA A/B ANTIGENS   BRAIN NATRIURETIC PEPTIDE    Narrative:     Performed at:  [facility]  [address],  [place], [address]   Phone [phone]   LACTIC ACID, PLASMA    Narrative:     Performed at:  [facility] [address],  [place], [address]   Phone [phone]   TROPONIN    Narrative:     Performed at:  [facility] [address],  [place], [address]   Phone [phone]   PROTIME-INR    Narrative:     Performed at:  [facility] [address],  [place], [address]   Phone [phone]   APTT    Narrative:     Performed at:  [facility] [address]  [place], [address]   Phone [phone]   URINE RT REFLEX TO CULTURE     CBC: no clinically significant anemia, leukocytosis, thrombocytopeniaBMP: no clinically significant electrolyte derangement or FERCHO  Trop and BNP not elevated   Medications   0.9 % sodium chloride bolus (1,000 mLs Intravenous New Bag 10/31/20 1552)   cefTRIAXone (ROCEPHIN) 1 g in sterile water 10 mL IV syringe (has no administration in time range)     And   azithromycin (ZITHROMAX) 500 mg in D5W 250ml Vial Mate (has no administration in time range)   albuterol sulfate  (90 Base) MCG/ACT inhaler 2 puff (has no administration in time range)   acetaminophen (TYLENOL) tablet 650 mg (650 mg Oral Given 10/31/20 1552)   dexamethasone (PF) (DECADRON) injection 10 mg (10 mg Intravenous Given 10/31/20 1553)         Course and MDM:  Pt presents with malaise, sob x 2 weeks in the setting of covid19 diagnosis. On exam she appears uncomfortable and dry but hemodynamically stable.  However does have 4L o2 requirement which is new and no increased work of breathing. I have low PE suspicion and age adjusted dimer is negative. Workup is showing bilateral pulm infiltrates. Will treat for CAP and admit for new hypoxia today. Pt is agreeable to plan. Patient Referrals:  No follow-up provider specified. Discharge Medications:  New Prescriptions    No medications on file       FINAL IMPRESSION  1. COVID-19 virus infection    2. Hypoxia    3. Fever in other diseases    4. Pneumonia due to organism        Blood pressure (!) 161/73, pulse 79, temperature 101.7 °F (38.7 °C), temperature source Oral, resp. rate 24, height 5' 4\" (1.626 m), weight 170 lb (77.1 kg), SpO2 93 %.      For further details of [facility] emergency department encounter, please see documentation by advanced practice provider     Sae Suh MD  10/31/20 0732

## 2020-10-31 NOTE — ED PROVIDER NOTES
[address] [facility]        Pt Name: Gabriela Nichols  MRN: [id number]  [place] 1940  Date of evaluation: 10/31/2020  Provider: LIZ Segovia - CNP  PCP: Janis Díaz MD     I have seen and evaluated this patient with my supervising physician Tonie Preston MD.    [facility]       Chief Complaint   Patient presents with    Shortness of Breath     Pt brought by  EMS from home with CC of SOB that started Tuesday. Pt reports being dx'ed with COVID on Tuesday reports recent admission at [facility]. pt on RA 88%, 94%on 2L. HISTORY OF PRESENT ILLNESS   (Location, Timing/Onset, Context/Setting, Quality, Duration, Modifying Factors, Severity, Associated Signs and Symptoms)  Note limiting factors. Gabriela Nichols is a 67 y.o. 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 Patient says she initially started getting sick on 10/11/2020 with having progressive shortness of air. She was admitted to [facility] for 3 days was discharged out without any medications. Patient says she has been attempting to manage her symptoms at home without any relief. Said that she did have a fever of 101 this morning and has not taken any antipyretics 4 hours prior to arrival.  Her daughter did note that she sat up for approximately 45 minutes yesterday and became very fatigued and had to lay down. She has had decreased activity due to her dyspnea on exertion. Says she has had decreased p.o. intake doing to feeling very fatigued and short of air. denies having home O2. Nursing Notes were all reviewed and agreed with or any disagreements were addressed in the HPI.     Review of medical records:  Discharge Summary    Patient ID:  Sophia Correia Jyotsna Treadwell  [id number]  88 y.o.  1940    Admit date: 10/27/2020    Discharge date and time: 10/29/2020    Admitting Physician: Dennis Lombardo MD     Discharge Physician: Marcelino Hopkins    Discharged Condition: stable    Consults: none    Procedures: None    Last BMP Result:    Lab 10/29/20  0619   SODIUM 141   POTASSIUM 3.2*   CHLORIDE 107   CO2 27   BUN 9   CREATININE 0.57*   CALCIUM 8.1*     Last CBC Result:    Lab 10/29/20  0619   WBC 5.1   HEMATOCRIT 39.4   PLATELETS 002   RBC 5.03     No results for input(s): PROTIME, INR in the last 72 hours. Hospital Course:    COVID-19 pneumonia: d/c Rocephin and Azithromycin, blood culture: NGTD. No need for steroid therpay, Remdisivir or convalescent plasma at this point, no signs of severe disease. Need to self isolate for a total of 10 days.      Hypoxemia, resolved: SpO2 drops when pt sleep, PRN O2     Fever and leukopenia, improving: could be due to above, monitor for now     Hypothyroidism: cont Synthroid.      Hx of migraine: stable, uses PRN Toradol at home        REVIEW OF SYSTEMS    (2-9 systems for level 4, 10 or more for level 5)     Review of Systems    Positives and Pertinent negatives as per HPI. Except as noted above in the ROS, all other systems were reviewed and negative. 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          SURGICAL HISTORY     Past Surgical History:   Procedure Laterality Date    BLADDER SURGERY  08/2016    restructured bladder     MOHS SURGERY  01/10/2019    right jawline anterior inferior         CURRENTMEDICATIONS       Previous Medications    ALPRAZOLAM (XANAX) 0.5 MG TABLET    Take 0.5 mg by mouth nightly as needed.     ASPIRIN 81 MG TABLET    Take 81 mg by mouth daily    CALCIUM There is no abdominal tenderness. Musculoskeletal: Normal range of motion. Right lower leg: No edema. Left lower leg: No edema. Skin:     General: Skin is warm and dry. Coloration: Skin is not pale. Neurological:      Mental Status: She is alert and oriented to person, place, and time. Psychiatric:         Behavior: Behavior normal. Behavior is cooperative.          DIAGNOSTIC RESULTS   LABS:    Labs Reviewed   BASIC METABOLIC PANEL - Abnormal; Notable for the following components:       Result Value    Potassium 3.4 (*)     Glucose 126 (*)     CREATININE 0.5 (*)     All other components within normal limits    Narrative:     Performed at:  [facility] [address],  [employer]   Phone [phone]   CBC WITH AUTO DIFFERENTIAL - Abnormal; Notable for the following components:    Lymphocytes Absolute 0.7 (*)     All other components within normal limits    Narrative:     Performed at:  [facility] [address],  [employer]   Phone [phone]   D-DIMER, QUANTITATIVE - Abnormal; Notable for the following components:    D-Dimer, Quant 415 (*)     All other components within normal limits    Narrative:     Performed at:  [facility] [address],  [employer]   Phone [phone]   CULTURE, BLOOD 2   CULTURE, BLOOD 1   RAPID INFLUENZA A/B ANTIGENS   BRAIN NATRIURETIC PEPTIDE    Narrative:     Performed at:  [facility] [address],  [employer]   Phone [phone]   LACTIC ACID, PLASMA    Narrative:     Performed at:  [facility] [address],  [employer]   Phone [phone]   TROPONIN    Narrative:     Performed at:  [facility] [address],  [employer]   Phone [phone]   PROTIME-INR    Narrative:     Performed at:  [facility]  [address] [place]   Phone [phone]   APTT    Narrative:     Performed at:  [facility]  [address], [address]   Phone [phone]   URINE RT REFLEX TO CULTURE       All other labs were within normal range or not returned as of this dictation. EKG: All EKG's are interpreted by the Emergency Department Physician in the absence of a cardiologist.  Please see their note for interpretation of EKG. RADIOLOGY:   Non-plain film images such as CT, Ultrasound and MRI are read by the radiologist. Plain radiographic images are visualized and preliminarily interpreted by the ED Provider with the below findings:        Interpretation per the Radiologist below, if available at the time of this note:    XR CHEST PORTABLE   Final Result   Multifocal ground-glass opacification likely pneumonia including atypical   viral pneumonia. Pulmonary edema is a less likely etiology for the findings. No results found. PROCEDURES   Unless otherwise noted below, none     Procedures    CRITICAL CARE TIME   N/A    CONSULTS:  None      EMERGENCY DEPARTMENT COURSE and DIFFERENTIAL DIAGNOSIS/MDM:   Vitals:    Vitals:    10/31/20 1443 10/31/20 1500   BP: (!) 155/77 (!) 161/73   Pulse: 81 79   Resp: 20 24   Temp: 101.7 °F (38.7 °C)    TempSrc: Oral    SpO2: 96% 93%   Weight: 170 lb (77.1 kg)    Height: 5' 4\" (1.626 m)        Patient was given the following medications:  Medications   0.9 % sodium chloride bolus (1,000 mLs Intravenous New Bag 10/31/20 1552)   acetaminophen (TYLENOL) tablet 650 mg (650 mg Oral Given 10/31/20 1552)   dexamethasone (PF) (DECADRON) injection 10 mg (10 mg Intravenous Given 10/31/20 1553)           Pertinent Labs & Imaging studies reviewed. (See chart for details)   -  Patient seen and evaluated in the emergency department.   -  Triage and nursing notes reviewed and

## 2020-11-01 ENCOUNTER — APPOINTMENT (OUTPATIENT)
Dept: GENERAL RADIOLOGY | Age: 80
DRG: 871 | End: 2020-11-01
Payer: MEDICARE

## 2020-11-01 LAB
A/G RATIO: 1.1 (ref 1.1–2.2)
ABO/RH: NORMAL
ALBUMIN SERPL-MCNC: 3.1 G/DL (ref 3.4–5)
ALP BLD-CCNC: 73 U/L (ref 40–129)
ALT SERPL-CCNC: 18 U/L (ref 10–40)
ANION GAP SERPL CALCULATED.3IONS-SCNC: 12 MMOL/L (ref 3–16)
ANTIBODY SCREEN: NORMAL
AST SERPL-CCNC: 25 U/L (ref 15–37)
BASOPHILS ABSOLUTE: 0 K/UL (ref 0–0.2)
BASOPHILS RELATIVE PERCENT: 0.1 %
BILIRUB SERPL-MCNC: <0.2 MG/DL (ref 0–1)
BLOOD BANK DISPENSE STATUS: NORMAL
BLOOD BANK DISPENSE STATUS: NORMAL
BLOOD BANK PRODUCT CODE: NORMAL
BLOOD BANK PRODUCT CODE: NORMAL
BPU ID: NORMAL
BPU ID: NORMAL
BUN BLDV-MCNC: 11 MG/DL (ref 7–20)
C-REACTIVE PROTEIN: 192.1 MG/L (ref 0–5.1)
CALCIUM SERPL-MCNC: 8.3 MG/DL (ref 8.3–10.6)
CHLORIDE BLD-SCNC: 103 MMOL/L (ref 99–110)
CO2: 23 MMOL/L (ref 21–32)
CREAT SERPL-MCNC: <0.5 MG/DL (ref 0.6–1.2)
D DIMER: 326 NG/ML DDU (ref 0–229)
DESCRIPTION BLOOD BANK: NORMAL
DESCRIPTION BLOOD BANK: NORMAL
EKG ATRIAL RATE: 80 BPM
EKG DIAGNOSIS: NORMAL
EKG P AXIS: 17 DEGREES
EKG P-R INTERVAL: 192 MS
EKG Q-T INTERVAL: 388 MS
EKG QRS DURATION: 68 MS
EKG QTC CALCULATION (BAZETT): 447 MS
EKG R AXIS: -21 DEGREES
EKG T AXIS: 50 DEGREES
EKG VENTRICULAR RATE: 80 BPM
EOSINOPHILS ABSOLUTE: 0 K/UL (ref 0–0.6)
EOSINOPHILS RELATIVE PERCENT: 0 %
FERRITIN: 479.9 NG/ML (ref 15–150)
FIBRINOGEN: 607 MG/DL (ref 200–397)
GFR AFRICAN AMERICAN: >60
GFR NON-AFRICAN AMERICAN: >60
GLOBULIN: 2.9 G/DL
GLUCOSE BLD-MCNC: 133 MG/DL (ref 70–99)
HCT VFR BLD CALC: 39.9 % (ref 36–48)
HEMOGLOBIN: 13.5 G/DL (ref 12–16)
LACTIC ACID: 0.8 MMOL/L (ref 0.4–2)
LYMPHOCYTES ABSOLUTE: 0.8 K/UL (ref 1–5.1)
LYMPHOCYTES RELATIVE PERCENT: 23.9 %
MAGNESIUM: 2.1 MG/DL (ref 1.8–2.4)
MCH RBC QN AUTO: 30.3 PG (ref 26–34)
MCHC RBC AUTO-ENTMCNC: 33.7 G/DL (ref 31–36)
MCV RBC AUTO: 89.9 FL (ref 80–100)
MONOCYTES ABSOLUTE: 0.3 K/UL (ref 0–1.3)
MONOCYTES RELATIVE PERCENT: 10.4 %
NEUTROPHILS ABSOLUTE: 2.1 K/UL (ref 1.7–7.7)
NEUTROPHILS RELATIVE PERCENT: 65.6 %
PDW BLD-RTO: 13.4 % (ref 12.4–15.4)
PLATELET # BLD: 191 K/UL (ref 135–450)
PMV BLD AUTO: 7.6 FL (ref 5–10.5)
POTASSIUM REFLEX MAGNESIUM: 3.3 MMOL/L (ref 3.5–5.1)
PROCALCITONIN: 0.09 NG/ML (ref 0–0.15)
RBC # BLD: 4.44 M/UL (ref 4–5.2)
SODIUM BLD-SCNC: 138 MMOL/L (ref 136–145)
TOTAL PROTEIN: 6 G/DL (ref 6.4–8.2)
TROPONIN: <0.01 NG/ML
TROPONIN: <0.01 NG/ML
WBC # BLD: 3.2 K/UL (ref 4–11)

## 2020-11-01 PROCEDURE — 6360000002 HC RX W HCPCS: Performed by: INTERNAL MEDICINE

## 2020-11-01 PROCEDURE — 36600 WITHDRAWAL OF ARTERIAL BLOOD: CPT

## 2020-11-01 PROCEDURE — 6370000000 HC RX 637 (ALT 250 FOR IP): Performed by: INTERNAL MEDICINE

## 2020-11-01 PROCEDURE — 80053 COMPREHEN METABOLIC PANEL: CPT

## 2020-11-01 PROCEDURE — 86900 BLOOD TYPING SEROLOGIC ABO: CPT

## 2020-11-01 PROCEDURE — 86850 RBC ANTIBODY SCREEN: CPT

## 2020-11-01 PROCEDURE — 94640 AIRWAY INHALATION TREATMENT: CPT

## 2020-11-01 PROCEDURE — 94761 N-INVAS EAR/PLS OXIMETRY MLT: CPT

## 2020-11-01 PROCEDURE — 84484 ASSAY OF TROPONIN QUANT: CPT

## 2020-11-01 PROCEDURE — 2500000003 HC RX 250 WO HCPCS: Performed by: INTERNAL MEDICINE

## 2020-11-01 PROCEDURE — 82803 BLOOD GASES ANY COMBINATION: CPT

## 2020-11-01 PROCEDURE — 85379 FIBRIN DEGRADATION QUANT: CPT

## 2020-11-01 PROCEDURE — 2700000000 HC OXYGEN THERAPY PER DAY

## 2020-11-01 PROCEDURE — 83735 ASSAY OF MAGNESIUM: CPT

## 2020-11-01 PROCEDURE — XW033E5 INTRODUCTION OF REMDESIVIR ANTI-INFECTIVE INTO PERIPHERAL VEIN, PERCUTANEOUS APPROACH, NEW TECHNOLOGY GROUP 5: ICD-10-PCS | Performed by: STUDENT IN AN ORGANIZED HEALTH CARE EDUCATION/TRAINING PROGRAM

## 2020-11-01 PROCEDURE — XW13325 TRANSFUSION OF CONVALESCENT PLASMA (NONAUTOLOGOUS) INTO PERIPHERAL VEIN, PERCUTANEOUS APPROACH, NEW TECHNOLOGY GROUP 5: ICD-10-PCS | Performed by: STUDENT IN AN ORGANIZED HEALTH CARE EDUCATION/TRAINING PROGRAM

## 2020-11-01 PROCEDURE — 36415 COLL VENOUS BLD VENIPUNCTURE: CPT

## 2020-11-01 PROCEDURE — 99223 1ST HOSP IP/OBS HIGH 75: CPT | Performed by: INTERNAL MEDICINE

## 2020-11-01 PROCEDURE — 86140 C-REACTIVE PROTEIN: CPT

## 2020-11-01 PROCEDURE — 83605 ASSAY OF LACTIC ACID: CPT

## 2020-11-01 PROCEDURE — 85384 FIBRINOGEN ACTIVITY: CPT

## 2020-11-01 PROCEDURE — 2580000003 HC RX 258: Performed by: INTERNAL MEDICINE

## 2020-11-01 PROCEDURE — 82728 ASSAY OF FERRITIN: CPT

## 2020-11-01 PROCEDURE — 84145 PROCALCITONIN (PCT): CPT

## 2020-11-01 PROCEDURE — 36430 TRANSFUSION BLD/BLD COMPNT: CPT

## 2020-11-01 PROCEDURE — 86901 BLOOD TYPING SEROLOGIC RH(D): CPT

## 2020-11-01 PROCEDURE — 71045 X-RAY EXAM CHEST 1 VIEW: CPT

## 2020-11-01 PROCEDURE — 85025 COMPLETE CBC W/AUTO DIFF WBC: CPT

## 2020-11-01 PROCEDURE — 1200000000 HC SEMI PRIVATE

## 2020-11-01 RX ORDER — 0.9 % SODIUM CHLORIDE 0.9 %
20 INTRAVENOUS SOLUTION INTRAVENOUS ONCE
Status: COMPLETED | OUTPATIENT
Start: 2020-11-01 | End: 2020-11-01

## 2020-11-01 RX ORDER — 0.9 % SODIUM CHLORIDE 0.9 %
30 INTRAVENOUS SOLUTION INTRAVENOUS PRN
Status: DISCONTINUED | OUTPATIENT
Start: 2020-11-01 | End: 2020-11-13 | Stop reason: HOSPADM

## 2020-11-01 RX ORDER — DEXAMETHASONE SODIUM PHOSPHATE 10 MG/ML
10 INJECTION, SOLUTION INTRAMUSCULAR; INTRAVENOUS EVERY 12 HOURS
Status: DISCONTINUED | OUTPATIENT
Start: 2020-11-01 | End: 2020-11-02

## 2020-11-01 RX ADMIN — REMDESIVIR 200 MG: 100 INJECTION, POWDER, LYOPHILIZED, FOR SOLUTION INTRAVENOUS at 11:47

## 2020-11-01 RX ADMIN — ALBUTEROL SULFATE 2 PUFF: 90 AEROSOL, METERED RESPIRATORY (INHALATION) at 16:34

## 2020-11-01 RX ADMIN — Medication 10 ML: at 11:47

## 2020-11-01 RX ADMIN — ALBUTEROL SULFATE 2 PUFF: 90 AEROSOL, METERED RESPIRATORY (INHALATION) at 12:40

## 2020-11-01 RX ADMIN — Medication 10 ML: at 08:41

## 2020-11-01 RX ADMIN — Medication 500 MG: at 08:40

## 2020-11-01 RX ADMIN — ALBUTEROL SULFATE 2 PUFF: 90 AEROSOL, METERED RESPIRATORY (INHALATION) at 08:55

## 2020-11-01 RX ADMIN — IPRATROPIUM BROMIDE 2 PUFF: 17 AEROSOL, METERED RESPIRATORY (INHALATION) at 16:35

## 2020-11-01 RX ADMIN — DEXAMETHASONE SODIUM PHOSPHATE 10 MG: 10 INJECTION, SOLUTION INTRAMUSCULAR; INTRAVENOUS at 21:21

## 2020-11-01 RX ADMIN — Medication 10 ML: at 21:22

## 2020-11-01 RX ADMIN — ASPIRIN 81 MG: 81 TABLET, COATED ORAL at 08:40

## 2020-11-01 RX ADMIN — IPRATROPIUM BROMIDE 2 PUFF: 17 AEROSOL, METERED RESPIRATORY (INHALATION) at 08:55

## 2020-11-01 RX ADMIN — ENOXAPARIN SODIUM 30 MG: 30 INJECTION SUBCUTANEOUS at 21:21

## 2020-11-01 RX ADMIN — ALBUTEROL SULFATE 2 PUFF: 90 AEROSOL, METERED RESPIRATORY (INHALATION) at 20:44

## 2020-11-01 RX ADMIN — SODIUM CHLORIDE 20 ML: 9 INJECTION, SOLUTION INTRAVENOUS at 12:30

## 2020-11-01 RX ADMIN — ENOXAPARIN SODIUM 40 MG: 40 INJECTION SUBCUTANEOUS at 08:40

## 2020-11-01 RX ADMIN — Medication 10 ML: at 11:52

## 2020-11-01 RX ADMIN — DEXAMETHASONE SODIUM PHOSPHATE 10 MG: 10 INJECTION, SOLUTION INTRAMUSCULAR; INTRAVENOUS at 11:52

## 2020-11-01 RX ADMIN — LEVOTHYROXINE SODIUM 112 MCG: 0.11 TABLET ORAL at 06:02

## 2020-11-01 RX ADMIN — IPRATROPIUM BROMIDE 2 PUFF: 17 AEROSOL, METERED RESPIRATORY (INHALATION) at 20:44

## 2020-11-01 RX ADMIN — IPRATROPIUM BROMIDE 2 PUFF: 17 AEROSOL, METERED RESPIRATORY (INHALATION) at 12:38

## 2020-11-01 RX ADMIN — VENLAFAXINE HYDROCHLORIDE 225 MG: 150 CAPSULE, EXTENDED RELEASE ORAL at 08:40

## 2020-11-01 RX ADMIN — ACETAMINOPHEN 650 MG: 325 TABLET ORAL at 03:54

## 2020-11-01 ASSESSMENT — PAIN SCALES - GENERAL: PAINLEVEL_OUTOF10: 3

## 2020-11-01 NOTE — SIGNIFICANT EVENT
Rapid response was called, reported patient woke up one hour ago and desatted requiring increase from 4L to now in oxygen requirement to 15L. Primary was called recommended Rapid response. On arrival patient was resting comfortably, on 15L NC was just put in from 8L, inspiratory crackles noted, pulse ox 94%, talking in complete sentence with no accessory muscle usage. ABG and CXR ordered given worsening oxygen requirement. No prior hx of lung dz, covid positive. Noted patient is receiving azithr,ceftriaxone. Held fluids in the setting of covid and remains hemodynamically stable. ABG showing consistent hypoxia noted pulse ox with no wave form. Given abg was obtained fast and very shortly from being put on 15L, thus abg is likely representing patient being on 8L. CXR no change. adjustment made to visualize wave form and to wean off oxygen as needed to keep pulse ox >89%.

## 2020-11-01 NOTE — FLOWSHEET NOTE
Rapid Response Quick Summary    Room: [address] [place] [address]86     Assessment of concern / patient: hypoxia, increased O2 requirement  Physician involved: Bernadette  Interventions: ABG done- acceptable.  SPO2 adequate on 15 L O2 HFNC  Disposition: remain on 5T

## 2020-11-01 NOTE — PROGRESS NOTES
.4 Eyes Skin Assessment     NAME:  Eduardo Sr  YOB: 1940  MEDICAL RECORD NUMBER:  [id number]    The patient is being assess for  Admission    I agree that 2 RN's have performed a thorough Head to Toe Skin Assessment on the patient. ALL assessment sites listed below have been assessed. Areas assessed by both nurses:    Head, Face, Ears, Shoulders, Back, Chest, Arms, Elbows, Hands, Sacrum. Buttock, Coccyx, Ischium and Legs. Feet and Heels        Does the Patient have a Wound?  No noted wound(s)       Earnest Prevention initiated:  Yes   Wound Care Orders initiated:  NA    Pressure Injury (Stage 3,4, Unstageable, DTI, NWPT, and Complex wounds) if present place consult order under [de-identified] NA    New and Established Ostomies if present place consult order under : NA      Nurse 1 eSignature: Electronically signed by Garrison Cuellar RN on 11/1/20 at 5:16 AM EST    **SHARE this note so that the co-signing nurse is able to place an [id number]**    Nurse 2 eSignature: Electronically signed by Jhonny Galeana RN on 11/2/20 at 9:20 AM EST

## 2020-11-01 NOTE — CONSULTS
Infectious Diseases Inpatient Consult Note      Reason for Consult: COVID 19 Pneumonia and resp failure     Requesting Physician:  /[place]    Primary Care Physician:  Brenden Ford MD      CHIEF COMPLAINT:     Chief Complaint   Patient presents with    Shortness of Breath     Pt brought by [facility] from home with CC of SOB that started Tuesday. Pt reports being dx'ed with COVID on Tuesday reports recent admission at [facility]. pt on RA 88%, 94%on 2L. HISTORY OF PRESENT ILLNESS:  67 y.o. woman was recently hospitalized [facility] with cough, fevers and PNA was tested +Ve for COVID 19 on 10/27 and she received IV Ceftriaxone and Azithromycin there pending results and once COVID +VE she was d/c to home and did no receive any steroids and she was on nasal cannula but not clear if she was home with supplemental Oxygen she is now admitted with acute sob, hypoxia and in resp distress. CXR with progressive infiltrates and CRP elevated at 192 , WBC with mild Leukopenia, D dimer at  326 , given the on going cough, some chest pains with acute sob we are consulted for recommendations  Location :cough, sob and chest pains with breathing from 4-5 days now worse from yesterday        Quality :  Aching      Severity : 5/10   Duration : 2 days      Timing : intermittent  Context : in the context of recent Hospitalization and tested +Ve for COVID  19 ON 10/27    Modifying factors :better with Oxygen   Associated signs and symptoms: sob, cough, fatigue, fevers T max at 101.7 on admit and Blood cx urine cx in process. CXR with bi lateral ground glass changes noted consistent with COVID  19 PNA. 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         Past Surgical History:    Past Surgical History:   Procedure Laterality Date    BLADDER SURGERY  08/2016    restructured bladder     MOHS SURGERY  01/10/2019    right jawline anterior inferior       Current Medications:    Outpatient Medications Marked as Taking for the 10/31/20 encounter [facility] Encounter)   Medication Sig Dispense Refill    aspirin 81 MG tablet Take 81 mg by mouth daily      ketorolac (TORADOL) 30 MG/ML injection       venlafaxine (EFFEXOR-XR) 150 MG XR capsule Take 225 mg by mouth daily Indications: Per PT 3/14/2016      SYNTHROID 112 MCG tablet       Calcium Carbonate-Vit D-Min 600-200 MG-UNIT TABS Take 2,000 Units by mouth. Allergies:  Patient has no known allergies. Immunizations : There is no immunization history on file for this patient. Social History:     Social History     Tobacco Use    Smoking status: Former Smoker    Smokeless tobacco: Never Used    Tobacco comment: 1980   Substance Use Topics    Alcohol use:  Yes    Drug use: Not on file     Social History     Tobacco Use   Smoking Status Former Smoker   Smokeless Tobacco Never Used   Tobacco Comment    1980      Family History   Problem Relation Age of Onset    Heart Disease Daughter          REVIEW OF SYSTEMS:      Constitutional:  fevers+ , chills, night sweats  Eyes:  negative for blurred vision, eye discharge, visual disturbance   HEENT:  negative for hearing loss, ear drainage,nasal congestion  Respiratory:  cough+ , shortness of breath+  or hemoptysis   Cardiovascular:  negative for chest pain, palpitations, syncope  Gastrointestinal:  negative for nausea, vomiting, diarrhea, constipation, abdominal pain  Genitourinary:  negative for frequency, dysuria, urinary incontinence, hematuria  Hematologic/Lymphatic:  negative for easy bruising, bleeding and lymphadenopathy  Allergic/Immunologic:  negative for recurrent infections, angioedema, anaphylaxis   Endocrine:  negative for weight changes, polyuria, polydipsia and polyphagia  Musculoskeletal:  negative for joint  pain, swelling, decreased range of motion  Integumentary: No rashes, skin lesions  Neurological:  negative for headaches, slurred speech, unilateral weakness  Psychiatric: negative for hallucinations,confusion,agitation. PHYSICAL EXAM:      Vitals:    /74   Pulse 68   Temp 98.3 °F (36.8 °C) (Oral)   Resp 20   Ht 5' 4\" (1.626 m)   Wt 173 lb 11.2 oz (78.8 kg)   SpO2 92%   BMI 29.82 kg/m²     PHYSICAL EXAM:     In-person bedside physical examination deferred. Pursuant to the emergency declaration under the [address] [place] [address], [address] [place] [address] authority and the [employer] Act, this clinical encounter was conducted to provide necessary medical care. (Also consistent with new provisions and guidance offered by [facility] on March 18, 2020 in setting of COVID 19 outbreak and in order to preserve personal protective equipment in accordance with the flexibilities announced by CMS on March 30, 2020)   References: [email or web address]/Resources/COVID-19/3_18%20Telemed%20Guidance%20Updated%20March%2018. pdf?[email or web address]                      [email or web address]/Resources/COVID-19/3_18%20Telemed%20Guidance%20Updated%20March%2018. pdf?wxs=[email or web address]                      [email or web address] pdf                              Pulmonary: deferred  Abdomen/GI: deferred  Neuro: deferred  Skin: deferred  Musculoskeletal:  deferred  Genitourinary: Deferred  Psych: deferred  Lymphatic/Immunologic: deferred       DATA:    CBC:   Lab Results   Component Value Date    WBC 3.2 (L) 11/01/2020    HGB 13.5 11/01/2020    HCT 39.9 11/01/2020    MCV 89.9 11/01/2020     11/01/2020     RENAL:   Lab Results   Component Value Date    CREATININE <0.5 (L) 11/01/2020    BUN 11 11/01/2020    NA 138 11/01/2020    K 3.3 (L) 11/01/2020     11/01/2020    CO2 23 11/01/2020     SED RATE: No results found for: SEDRATE  CK: No results found for: CKTOTAL  CRP: No results found for: CRP  Hepatic Function Panel:   Lab Results   Component Value Date    ALKPHOS 73 11/01/2020    ALT 18 11/01/2020    AST 25 11/01/2020    PROT 6.0 11/01/2020    BILITOT <0.2 11/01/2020    LABALBU 3.1 11/01/2020     UA:  Lab Results   Component Value Date    COLORU YELLOW 10/31/2020    CLARITYU Clear 10/31/2020    GLUCOSEU Negative 10/31/2020    BILIRUBINUR Negative 10/31/2020    KETUA 40 10/31/2020    SPECGRAV 1.013 10/31/2020    BLOODU Negative 10/31/2020    PHUR 6.5 10/31/2020    PROTEINU TRACE 10/31/2020    UROBILINOGEN 0.2 10/31/2020    NITRU Negative 10/31/2020    LEUKOCYTESUR Negative 10/31/2020    LABMICR YES 10/31/2020    URINETYPE Voided 10/31/2020      Urine Microscopic:   Lab Results   Component Value Date    HYALCAST 2 10/31/2020    WBCUA 2 10/31/2020    RBCUA 2 10/31/2020    EPIU 6 10/31/2020     Urine Reflex to Culture:   Lab Results   Component Value Date    URRFLXCULT Not Indicated 10/31/2020         MICRO: cultures reviewed and updated by me     2019 Novel Coronavirus (CoVID-19), YANIV-BResulted: 10/28/2020 12:32 PM   Health  Component Name Value Ref Range   SARS-CoV-2, YANIV Detected (A)   Comment: This test is an amplified nucleic acid assay performed on a real time PCR platform.  This test was developed and its performance characteristics determined by the       Blood Culture: No results found for: BC, BLOODCULT2    Viral Culture:    No results found for: COVID19  Urine Culture: No results for input(s): LABURIN in the last 72 hours.     Scheduled Meds:   remdesivir IVPB  200 mg Intravenous Once    Followed by   Line Koch ON 11/2/2020] remdesivir IVPB  100 mg Intravenous Q24H    dexamethasone  10 mg Intravenous Q12H    levothyroxine  112 mcg Oral Daily    venlafaxine  225 mg Oral Daily    aspirin  81 mg Oral Daily    calcium elemental  500 mg Oral Daily    sodium chloride flush  10 mL Intravenous 2 times per day    enoxaparin  40 mg Subcutaneous Daily    albuterol sulfate HFA  2 puff Inhalation 4x daily    And    ipratropium  2 puff Inhalation 4x daily       Continuous Infusions:      PRN Meds:  sodium chloride, albuterol sulfate HFA, ALPRAZolam, sodium chloride flush, acetaminophen **OR** acetaminophen, magnesium hydroxide, promethazine **OR** ondansetron, sodium chloride, potassium chloride **OR** potassium alternative oral replacement **OR** potassium chloride, labetalol    Imaging:   XR CHEST PORTABLE   Final Result   No acute interval change. Bilateral airspace disease is similar to prior   exam.         XR CHEST PORTABLE   Final Result   Multifocal ground-glass opacification likely pneumonia including atypical   viral pneumonia. Pulmonary edema is a less likely etiology for the findings. cxr FROM    IMPRESSION:   1.  Parenchymal opacities in the left mid to lower lung zone and possible small focus in the medial right lower lung zone, concerning for multifocal pneumonia to include viral etiology. Follow-up recommended to ensure resolution. Approved by Adelaide Pantoja MD on 10/27/2020 5:20 PM EDT    I have personally reviewed the images and I agree with this report. Report Verified by: Johana Leyva MD at 10/27/2020 5:29 PM   All pertinent images and reports for the current Hospitalization were reviewed by me.     IMPRESSION:    Patient Active Problem List   Diagnosis    Squamous cell cancer of skin of jawline    COVID-19    Acute respiratory failure due to COVID-19 (HCC)    Depression    Hypothyroid     COVID 19 Pneumonia  Fevers and cough from above  Leukopenia from COVID  19   CXR with Multifocal PNA worsening findings   Acute Hypoxic resp failure  Currently on 15 lts nasal cannula  D dimer elevation   CRP elevation   BMI at 29   Recent admit at [facility] from 10/27 to 10/29 Tested +ve for COVID  19   cxr images reviewed    Unfortunately not doing well with progressive respiratory failure. She is at high risk of progression given the chest x-ray finding and duration of the disease with elevated D-dimer and CRP. I reviewed her chest x-ray from admission which indicates progressive lung changes from COVID-19 pneumonia. We will initiate IV remdesivir therapy and will get consent for convalescent plasma    We will need to monitor her CMP closely I suspect the leukopenia is secondary manifestation of COVID-19 infection    At this time it does not appear to have any secondary bacterial process hence antibiotics will be discontinued      Labs, Microbiology, Radiology and pertinent results from current hospitalization and care every where were reviewed by me as a part of the consultation. PLAN :  1.  Increase the dexamethasone dosing to 10 mg twice a day   2. Start IV remdesivir 200 mg loading followed by 100 milligrams daily for 5 days   3 we will monitor CMP closely  4. Type and screen stat  5. Get consent for convalescent plasma  6. We will order 2 units of convalescent plasma today  7. Discontinue azithromycin and ceftriaxone  8. High risk for progression intubation and ICU pulmonary critical care has been consulted  9. Records from  reviewed in detail    discussed with patient/Family and Nursing   Risk of Complications/Morbidity: High      · Illness(es)/ Infection present that pose threat to bodily function. · There is potential for severe exacerbation of infection/side effects of treatment. Therapy requires intensive monitoring for antimicrobial agent toxicity. Thanks for allowing me to participate in your patient's care please call me with any questions or concerns.     Dr. Zaria Ghosh MD  [facility] Physician  Phone: [phone]   Fax : [phone]

## 2020-11-01 NOTE — CONSULTS
PULMONARY AND CRITICAL CARE MEDICINE CONSULTATION NOTE    CONSULTING PHYSICIAN:  Pilar Hinton MD    ADMISSION DATE: 10/31/2020  ADMISSION DIAGNOSIS: COVID-19 [U07.1]  COVID-19 [U07.1]    REASON FOR CONSULT:   Chief Complaint   Patient presents with    Shortness of Breath     Pt brought by [facility] from home with CC of SOB that started Tuesday. Pt reports being dx'ed with COVID on Tuesday reports recent admission at [facility]. pt on RA 88%, 94%on 2L. DATE OF CONSULT: 10/31/2020    HISTORY OF PRESENT ILLNESS: 67y.o. year old female with a past medical history significant for squamous cell carcinoma, basal cell carcinoma, pancreatic cysts, migraines, hypothyroidism who presented to the hospital with worsening shortness of breath. Patient was diagnosed to have COVID-19 infection on 10/11/2020 with worsening shortness of breath. She was initially admitted to [facility] and was discharged without any medications. She reports that she was managing her symptoms at home but remained febrile. Her shortness was started to increase and she felt generalized fatigue. Eventually she came into the hospital.  She was seen to be hypoxic initially requiring 2 to 4 L/min of oxygen supplementation. Today patient is becoming significantly more short of breath and hypoxic. Now requiring high flow nasal cannula with 50 L/min of oxygen flow. REVIEW OF SYSTEMS:     CONSTITUTIONAL SYMPTOMS: The patient denies fever, fatigue, night sweats, weight loss or weight gain. HEENT: No vision changes. No tinnitus, Denies sinus pain. No hoarseness, or dysphagia. NECK: Patient denies swelling in the neck. CARDIOVASCULAR: Denies chest pain, palpitation, syncope. RESPIRATORY: As per HPI. GASTROINTESTINAL: Denies nausea, abdominal pain or change in bowel function. GENITOURINARY: Denies obstructive symptoms. No history of incontinence. BREASTS: No masses or lumps in the breasts. SKIN: No rashes or itching. MUSCULOSKELETAL: Denies weakness or bone pain. NEUROLOGICAL: No headaches or seizures. PSYCHIATRIC: Denies mood swings or depression. ENDOCRINE: Denies heat or cold intolerance or excessive thirst.  HEMATOLOGIC/LYMPHATIC: Denies easy bruising or lymph node swelling. ALLERGIC/IMMUNOLOGIC: No environmental allergies. PAST MEDICAL HISTORY:   Past Medical History:   Diagnosis Date    Back pain     Cancer ([facility] [address]     squamous cell carcinoma and basal cell carcinoma    Headache     Hypothyroidism     Migraines     Thyroid disease     UTI (lower urinary tract infection)     treated w/Bactrim per PT. PAST SURGICAL HISTORY:   Past Surgical History:   Procedure Laterality Date    BLADDER SURGERY  08/2016    restructured bladder     MOHS SURGERY  01/10/2019    right jawline anterior inferior       SOCIAL HISTORY:   Social History     Tobacco Use    Smoking status: Former Smoker    Smokeless tobacco: Never Used    Tobacco comment: 1980   Substance Use Topics    Alcohol use: Yes    Drug use: Not on file       FAMILY HISTORY:   Family History   Problem Relation Age of Onset    Heart Disease Daughter        MEDICATIONS:     No current facility-administered medications on file prior to encounter. Current Outpatient Medications on File Prior to Encounter   Medication Sig Dispense Refill    aspirin 81 MG tablet Take 81 mg by mouth daily      ketorolac (TORADOL) 30 MG/ML injection       venlafaxine (EFFEXOR-XR) 150 MG XR capsule Take 225 mg by mouth daily Indications: Per PT 3/14/2016      SYNTHROID 112 MCG tablet       Calcium Carbonate-Vit D-Min 600-200 MG-UNIT TABS Take 2,000 Units by mouth.       calcium carbonate (OSCAL) 500 MG TABS tablet Take 500 mg by mouth daily      Omega-3 Fatty Acids (FISH OIL) 1000 MG CAPS Take 3,000 mg by mouth 3 times daily      Diclofenac Potassium (CAMBIA PO) Take by mouth      trimethoprim (TRIMPEX) 100 MG tablet       ALPRAZolam (XANAX) 0.5 MG tablet Take 0.5 mg by mouth nightly as needed.  [START ON 11/2/2020] remdesivir IVPB  100 mg Intravenous Q24H    dexamethasone  10 mg Intravenous Q12H    enoxaparin  30 mg Subcutaneous BID    levothyroxine  112 mcg Oral Daily    venlafaxine  225 mg Oral Daily    aspirin  81 mg Oral Daily    calcium elemental  500 mg Oral Daily    sodium chloride flush  10 mL Intravenous 2 times per day    albuterol sulfate HFA  2 puff Inhalation 4x daily    And    ipratropium  2 puff Inhalation 4x daily       sodium chloride, albuterol sulfate HFA, ALPRAZolam, sodium chloride flush, acetaminophen **OR** acetaminophen, magnesium hydroxide, promethazine **OR** ondansetron, sodium chloride, potassium chloride **OR** potassium alternative oral replacement **OR** potassium chloride, labetalol      ALLERGIES:   Allergies as of 10/31/2020    (No Known Allergies)      OBJECTIVE:   height is 5' 4\" (1.626 m) and weight is 173 lb 11.2 oz (78.8 kg). Her oral temperature is 98.2 °F (36.8 °C). Her blood pressure is 137/88 and her pulse is 83. Her respiration is 20 and oxygen saturation is 92%. No intake/output data recorded. PHYSICAL EXAM:    CONSTITUTIONAL: She is a 67y.o.-year-old who appears her stated age. She is alert and oriented x 3 and in mild respiratory distress  HEENT: PERRLA, EOMI. No scleral icterus. No thrush, atraumatic, normocephalic. NECK: Supple, without cervical or supraclavicular lymphadenopathy:  CARDIOVASCULAR: Deferred due to full PPE precautions  RESPIRATORY & CHEST: Deferred due to full PPE precautions  GASTROINTESTINAL & ABDOMEN: Soft, nontender, non-distended, without hepatosplenomegaly. GENITOURINARY: Deferred. MUSCULOSKELETAL: No tenderness to palpation of the axial skeleton. There is no clubbing. No cyanosis. No edema of the lower extremities. SKIN OF BODY: No rash or jaundice. PSYCHIATRIC EVALUATION: Normal affect. Patient answers questions appropriately.    HEMATOLOGIC/LYMPHATIC/ IMMUNOLOGIC: No palpable lymphadenopathy. NEUROLOGIC: Alert and oriented x 3. Groslly non-focal. Motor strength is 5+/5 in all muscle groups. The patient has a normal sensorium globally. LABS:  Recent Labs     10/31/20  1511 10/31/20  1512 11/01/20  0542   WBC  --  4.8 3.2*   HGB  --  14.0 13.5   HCT  --  41.2 39.9   PLT  --  170 191   ALT  --   --  18   AST  --   --  25   NA  --  138 138   K  --  3.4* 3.3*   CL  --  101 103   CREATININE  --  0.5* <0.5*   BUN  --  10 11   CO2  --  23 23   INR 1.10  --   --        Recent Labs     10/31/20  1512 11/01/20  0542   GLUCOSE 126* 133*   CALCIUM 8.3 8.3    138   K 3.4* 3.3*   CO2 23 23    103   BUN 10 11   CREATININE 0.5* <0.5*       No results for input(s): PHART, LJJ3YEM, PO2ART, AAS9SDD, B5WONDFR, BEART, W4PBVLYY in the last 72 hours. Lab Results   Component Value Date    INR 1.10 10/31/2020    PROTIME 12.8 10/31/2020     No results found for: AMYLASE   No results found for: LABA1C  No results found for: EAG  No results found for: TSH, L6XRXUV, V0DEJMU, THYROIDAB, FT3, T4FREE  Lab Results   Component Value Date    TROPONINI <0.01 11/01/2020      Lab Results   Component Value Date    .1 (H) 11/01/2020      No results found for: BNP   Lab Results   Component Value Date    DDIMER 326 (H) 11/01/2020      Lab Results   Component Value Date    FERRITIN 479.9 (H) 11/01/2020      Lab Results   Component Value Date    LACTA 0.8 11/01/2020           IMAGING:    Chest x-ray portable 1 view done on 11/1/2020 was personally viewed by me and my interpretation is: Bilateral lung fields show scattered patchy infiltrates. No pleural effusions or pneumothorax seen. Cardiac silhouette is within normal limits. IMPRESSION:     1. Acute respiratory distress  2. Acute hypoxic respiratory failure  3. COVID-19 related pneumonia  4. Hypothyroidism  5. Hypercoagulable state    RECOMMENDATION:     1.  Patient has presented to the hospital with worsening shortness of breath and found to be progressively more hypoxic. 2. Suspicion is for worsening COVID-19 related pneumonia and lung disease. 3. Chest imaging shows bilateral scattered infiltrates consistent with that picture. 4. Her procalcitonin levels are within normal ranges. Low suspicion for community-acquired typical pneumonia. Antibiotics can be stopped. 5. Patient to get IV Decadron 20 mg daily, IV Remdesivir and 2 units of convalescent plasma. 6. Continue with high flow nasal cannula. Goal is to maintain SPO2 above 89%. 7. If patient seen to have worsening shortness of breath, increased work of breathing or persistent desaturation, she can be transferred to [facility] ICU for BiPAP support. 8. Continue with Lovenox 30 mg twice a day hypercoagulable state. Thank you for your consultation. We will continue to follow the patient. Lisandro Orellana MD  Pulmonary Critical Care and Sleep Medicine  10/31/2020, 5:17 PM    This note was completed using dragon medical speech recognition software. Grammatical errors, random word insertions, pronoun errors and incomplete sentences are occasional consequences of this technology due to software limitations. If there are questions or concerns about the content of this note of information contained within the body of this dictation they should be addressed with the provider for clarification.

## 2020-11-01 NOTE — CODE DOCUMENTATION
At 0330, VS taken and pt 02 sat was 91% on 8L. As this nurse was charting in the room, the pt 02 sat started dropping to 86%, then oxygen increased to 10, continuing to 15L. By 0400, pt o2 sat was 88%. Phone call made to Dr. Angel Rodriguez who stated that this nurse should call a Rapid Response. Rapid response called at 0405. Physician and nurses came from ICU. By the time staff arrived, pt was at 91% 02 sat on 15L. Physician ordered chest x-ray and ABG's. Once pt was at 92% on 15L, physician spoke with pt and explained that we will continue to monitor. Rapid completed at 0420.

## 2020-11-01 NOTE — PROGRESS NOTES
Progress Note - Dr. Llanes [place] - Internal Medicine  PCP: Zenaida Morel MD [id number] / [place] [id number] [address] Day: 1  Code Status: Full Code  Current Diet: DIET GENERAL;        CC: follow up on medical issues    Subjective:   Shahram Carey is a 67 y.o. female. She denies problems    Pt declined overnight  Rapid response called for 15L o2 need  After hospitalist eval,  Decision made to keep on floor    Now back on vapotherm  ID consulted      She denies chest pain, complains of shortness of breath, denies nausea,  denies emesis. 10 system Review of Systems is reviewed with patient, and pertinent positives are noted in HPI above . Otherwise, Review of systems is negative. I have reviewed the patient's medical and social history in detail and updated the computerized patient record. To recap: She  has a past medical history of Back pain, Cancer ([address] Headache, Hypothyroidism, Migraines, Thyroid disease, and UTI (lower urinary tract infection). . She  has a past surgical history that includes Bladder surgery (08/2016) and Mohs surgery (01/10/2019). . She  reports that she has quit smoking. She has never used smokeless tobacco. She reports current alcohol use. Tawana Cherry         Active Hospital Problems    Diagnosis Date Noted    COVID-19 [U07.1] 10/31/2020    Acute respiratory failure due to COVID-19 [facility]) [U07.1, J96.00] 10/31/2020    Depression [F32.9] 10/31/2020    Hypothyroid [E03.9] 10/31/2020       Current Facility-Administered Medications: albuterol sulfate  (90 Base) MCG/ACT inhaler 2 puff, 2 puff, Inhalation, Q6H PRN  ALPRAZolam (XANAX) tablet 0.5 mg, 0.5 mg, Oral, Nightly PRN  levothyroxine (SYNTHROID) tablet 112 mcg, 112 mcg, Oral, Daily  venlafaxine (EFFEXOR XR) extended release capsule 225 mg, 225 mg, Oral, Daily  aspirin EC tablet 81 mg, 81 mg, Oral, Daily  calcium elemental (OSCAL) tablet 500 mg, 500 mg, Oral, Daily  sodium chloride flush 0.9 % injection 10 mL, 10 mL, Intravenous, 2 times per day  sodium chloride flush 0.9 % injection 10 mL, 10 mL, Intravenous, PRN  acetaminophen (TYLENOL) tablet 650 mg, 650 mg, Oral, Q6H PRN **OR** acetaminophen (TYLENOL) suppository 650 mg, 650 mg, Rectal, Q6H PRN  magnesium hydroxide (MILK OF MAGNESIA) 400 MG/5ML suspension 30 mL, 30 mL, Oral, Daily PRN  promethazine (PHENERGAN) tablet 12.5 mg, 12.5 mg, Oral, Q6H PRN **OR** ondansetron (ZOFRAN) injection 4 mg, 4 mg, Intravenous, Q6H PRN  enoxaparin (LOVENOX) injection 40 mg, 40 mg, Subcutaneous, Daily  azithromycin (ZITHROMAX) 500 mg in D5W 250ml Vial Mate, 500 mg, Intravenous, Q24H **AND** cefTRIAXone (ROCEPHIN) 1 g in sterile water 10 mL IV syringe, 1 g, Intravenous, Q24H  0.9 % sodium chloride bolus, 500 mL, Intravenous, PRN  potassium chloride (KLOR-CON M) extended release tablet 40 mEq, 40 mEq, Oral, PRN **OR** potassium bicarb-citric acid (EFFER-K) effervescent tablet 40 mEq, 40 mEq, Oral, PRN **OR** potassium chloride 10 mEq/100 mL IVPB (Peripheral Line), 10 mEq, Intravenous, PRN  labetalol (NORMODYNE;TRANDATE) injection 10 mg, 10 mg, Intravenous, Q6H PRN  dexamethasone (PF) (DECADRON) injection 6 mg, 6 mg, Intravenous, Q24H  albuterol sulfate  (90 Base) MCG/ACT inhaler 2 puff, 2 puff, Inhalation, 4x daily **AND** ipratropium (ATROVENT HFA) 17 MCG/ACT inhaler 2 puff, 2 puff, Inhalation, 4x daily **AND** MDI Treatment, , , 4x daily         Objective:  /74   Pulse 68   Temp 98.3 °F (36.8 °C) (Oral)   Resp 20   Ht 5' 4\" (1.626 m)   Wt 173 lb 11.2 oz (78.8 kg)   SpO2 92%   BMI 29.82 kg/m²      Patient Vitals for the past 24 hrs:   BP Temp Temp src Pulse Resp SpO2 Height Weight   11/01/20 0921 -- -- -- -- 20 92 % -- --   11/01/20 0858 -- -- -- -- 18 (!) 89 % -- --   11/01/20 0838 122/74 98.3 °F (36.8 °C) Oral 68 18 90 % -- --   11/01/20 0600 -- -- -- -- -- 91 % -- --   11/01/20 0419 -- -- -- -- 18 93 % -- --   11/01/20 0400 -- -- -- -- -- (!) 88 % -- --   11/01/20 0330 133/76 98 °F (36.7 °C) Axillary 73 18 91 % -- --   11/01/20 0000 -- -- -- -- -- -- 5' 4\" (1.626 m) 173 lb 11.2 oz (78.8 kg)   10/31/20 2115 126/71 98.4 °F (36.9 °C) Oral 78 18 91 % 5' 4\" (1.626 m) --   10/31/20 2038 -- -- -- 77 15 94 % -- --   10/31/20 2037 -- -- -- 77 23 91 % -- --   10/31/20 2036 -- -- -- 78 22 (!) 86 % -- --   10/31/20 2000 (!) 126/55 -- -- 82 25 90 % -- --   10/31/20 1921 -- -- -- 83 22 94 % -- --   10/31/20 1917 132/61 98.6 °F (37 °C) Oral 92 18 93 % -- --   10/31/20 1748 -- -- -- -- -- 94 % -- --   10/31/20 1730 126/75 100 °F (37.8 °C) Oral 76 28 93 % -- --   10/31/20 1715 -- -- -- 79 28 92 % -- --   10/31/20 1700 138/75 -- -- 81 23 92 % -- --   10/31/20 1645 -- -- -- 78 28 93 % -- --   10/31/20 1630 139/69 -- -- 78 29 94 % -- --   10/31/20 1615 -- -- -- 79 25 95 % -- --   10/31/20 1600 (!) 151/69 -- -- 77 25 95 % -- --   10/31/20 1530 (!) 154/87 -- -- 83 23 -- -- --   10/31/20 1500 (!) 161/73 -- -- 79 24 93 % -- --   10/31/20 1443 (!) 155/77 101.7 °F (38.7 °C) Oral 81 20 96 % 5' 4\" (1.626 m) 170 lb (77.1 kg)     Patient Vitals for the past 96 hrs (Last 3 readings):   Weight   11/01/20 0000 173 lb 11.2 oz (78.8 kg)   10/31/20 1443 170 lb (77.1 kg)           Intake/Output Summary (Last 24 hours) at 11/1/2020 1016  Last data filed at 11/1/2020 0607  Gross per 24 hour   Intake --   Output 850 ml   Net -850 ml         Physical Exam:   /74   Pulse 68   Temp 98.3 °F (36.8 °C) (Oral)   Resp 20   Ht 5' 4\" (1.626 m)   Wt 173 lb 11.2 oz (78.8 kg)   SpO2 92%   BMI 29.82 kg/m²   General appearance: alert, appears stated age and cooperative  Head: Normocephalic, without obvious abnormality, atraumatic  Lungs: rales bilat  Heart: regular rate and rhythm, S1, S2 normal, no murmur, click, rub or gallop  Abdomen: soft, non-tender; bowel sounds normal; no masses,  no organomegaly  Extremities: extremities normal, atraumatic, no cyanosis or edema    Labs:  Lab Results   Component Value Date consult ID. May require transfer to ICU  Active Problems:    COVID-19 -Established problem. Stable. Plan: cont iv decadron. Consider remdesivir    Depression -Established problem. Stable. Plan: Continue present orders/plan. Hypothyroid -Established problem. Stable. Plan: Continue present orders/plan.      Total critical care time caring for this patient with life threatening illness, including direct patient contact, management of life support systems, review of data including imaging and labs, discussions with other team members and physicians at least 40 minutes today              Hilario Singh  11/1/2020

## 2020-11-01 NOTE — FLOWSHEET NOTE
Daughter Ayana Holguin contacted spiritual care requesting anointing of the sick before her mother might be put on ventilator. Daughter works with [employer] at [employer].  assured daughter that she would follow up on Monday, which was acceptable.

## 2020-11-01 NOTE — PROGRESS NOTES
SBA x1, Covid +   Pt admitted to 5T @ 2115 on 10/31. Pt feeling worse after being d/c'd from [facility], also with Covid. Pt brought up to floor on 7L, now at 15 L. Rapid response called d/t 88% on 14L per Dr. Josue Ball directions. Chest x-ray  and ABG's unremarkable. Pt lives at home alone and normally walks without walker or w/c and drives. Baseline is zero oxygen. New IV to right wrist.  Pt had squad stick to right AC. Sanjay Doshi Electronically signed by Amber Llamas RN on 11/1/2020 at 5:55 AM

## 2020-11-01 NOTE — PROGRESS NOTES
Scheduled medications given. consent signed for blood. Patient denies any questions or concerns regarding blood.

## 2020-11-01 NOTE — PROGRESS NOTES
Pt only at 88% on 15L. Spoke with Dr. Dulce Maria Khan and was directed to call a rapid response. Electronically signed by Artemio Martinez RN on 11/1/2020 at 4:06 AM

## 2020-11-02 ENCOUNTER — APPOINTMENT (OUTPATIENT)
Dept: GENERAL RADIOLOGY | Age: 80
DRG: 871 | End: 2020-11-02
Payer: MEDICARE

## 2020-11-02 PROBLEM — U07.1 ACUTE RESPIRATORY FAILURE DUE TO COVID-19 (HCC): Status: ACTIVE | Noted: 2020-11-02

## 2020-11-02 PROBLEM — J96.00 ACUTE RESPIRATORY FAILURE DUE TO COVID-19 (HCC): Status: ACTIVE | Noted: 2020-11-02

## 2020-11-02 LAB
A/G RATIO: 1 (ref 1.1–2.2)
ALBUMIN SERPL-MCNC: 3 G/DL (ref 3.4–5)
ALP BLD-CCNC: 72 U/L (ref 40–129)
ALT SERPL-CCNC: 21 U/L (ref 10–40)
ANION GAP SERPL CALCULATED.3IONS-SCNC: 12 MMOL/L (ref 3–16)
AST SERPL-CCNC: 26 U/L (ref 15–37)
BASE EXCESS ARTERIAL: -1 (ref -3–3)
BASOPHILS ABSOLUTE: 0 K/UL (ref 0–0.2)
BASOPHILS RELATIVE PERCENT: 0.1 %
BILIRUB SERPL-MCNC: 0.3 MG/DL (ref 0–1)
BUN BLDV-MCNC: 16 MG/DL (ref 7–20)
CALCIUM SERPL-MCNC: 8.7 MG/DL (ref 8.3–10.6)
CHLORIDE BLD-SCNC: 104 MMOL/L (ref 99–110)
CO2: 24 MMOL/L (ref 21–32)
CREAT SERPL-MCNC: 0.5 MG/DL (ref 0.6–1.2)
EOSINOPHILS ABSOLUTE: 0 K/UL (ref 0–0.6)
EOSINOPHILS RELATIVE PERCENT: 0 %
GFR AFRICAN AMERICAN: >60
GFR NON-AFRICAN AMERICAN: >60
GLOBULIN: 3 G/DL
GLUCOSE BLD-MCNC: 147 MG/DL (ref 70–99)
HCO3 ARTERIAL: 23.3 MMOL/L (ref 21–29)
HCT VFR BLD CALC: 38.4 % (ref 36–48)
HEMOGLOBIN: 12.8 G/DL (ref 12–16)
LYMPHOCYTES ABSOLUTE: 0.6 K/UL (ref 1–5.1)
LYMPHOCYTES RELATIVE PERCENT: 10.7 %
MCH RBC QN AUTO: 30.1 PG (ref 26–34)
MCHC RBC AUTO-ENTMCNC: 33.4 G/DL (ref 31–36)
MCV RBC AUTO: 90.1 FL (ref 80–100)
MONOCYTES ABSOLUTE: 0.4 K/UL (ref 0–1.3)
MONOCYTES RELATIVE PERCENT: 6.6 %
NEUTROPHILS ABSOLUTE: 4.9 K/UL (ref 1.7–7.7)
NEUTROPHILS RELATIVE PERCENT: 82.6 %
O2 SAT, ARTERIAL: 94 % (ref 93–100)
PCO2 ARTERIAL: 34.8 MM HG (ref 35–45)
PDW BLD-RTO: 13.6 % (ref 12.4–15.4)
PERFORMED ON: ABNORMAL
PH ARTERIAL: 7.43 (ref 7.35–7.45)
PLATELET # BLD: 231 K/UL (ref 135–450)
PMV BLD AUTO: 7.6 FL (ref 5–10.5)
PO2 ARTERIAL: 68.8 MM HG (ref 75–108)
POC SAMPLE TYPE: ABNORMAL
POTASSIUM SERPL-SCNC: 3.5 MMOL/L (ref 3.5–5.1)
RBC # BLD: 4.26 M/UL (ref 4–5.2)
SODIUM BLD-SCNC: 140 MMOL/L (ref 136–145)
TCO2 ARTERIAL: 24 MMOL/L
TOTAL PROTEIN: 6 G/DL (ref 6.4–8.2)
WBC # BLD: 5.9 K/UL (ref 4–11)

## 2020-11-02 PROCEDURE — 71045 X-RAY EXAM CHEST 1 VIEW: CPT

## 2020-11-02 PROCEDURE — 2580000003 HC RX 258: Performed by: INTERNAL MEDICINE

## 2020-11-02 PROCEDURE — 6360000002 HC RX W HCPCS: Performed by: INTERNAL MEDICINE

## 2020-11-02 PROCEDURE — 1200000000 HC SEMI PRIVATE

## 2020-11-02 PROCEDURE — 80053 COMPREHEN METABOLIC PANEL: CPT

## 2020-11-02 PROCEDURE — 36415 COLL VENOUS BLD VENIPUNCTURE: CPT

## 2020-11-02 PROCEDURE — 85025 COMPLETE CBC W/AUTO DIFF WBC: CPT

## 2020-11-02 PROCEDURE — 2500000003 HC RX 250 WO HCPCS: Performed by: INTERNAL MEDICINE

## 2020-11-02 PROCEDURE — 6370000000 HC RX 637 (ALT 250 FOR IP): Performed by: INTERNAL MEDICINE

## 2020-11-02 PROCEDURE — 2700000000 HC OXYGEN THERAPY PER DAY

## 2020-11-02 PROCEDURE — 94640 AIRWAY INHALATION TREATMENT: CPT

## 2020-11-02 PROCEDURE — 99233 SBSQ HOSP IP/OBS HIGH 50: CPT | Performed by: INTERNAL MEDICINE

## 2020-11-02 PROCEDURE — 99291 CRITICAL CARE FIRST HOUR: CPT | Performed by: INTERNAL MEDICINE

## 2020-11-02 PROCEDURE — 2580000003 HC RX 258

## 2020-11-02 RX ORDER — SODIUM CHLORIDE 9 MG/ML
INJECTION, SOLUTION INTRAVENOUS
Status: COMPLETED
Start: 2020-11-02 | End: 2020-11-02

## 2020-11-02 RX ADMIN — ASPIRIN 81 MG: 81 TABLET, COATED ORAL at 09:22

## 2020-11-02 RX ADMIN — ENOXAPARIN SODIUM 30 MG: 30 INJECTION SUBCUTANEOUS at 09:22

## 2020-11-02 RX ADMIN — ALBUTEROL SULFATE 2 PUFF: 90 AEROSOL, METERED RESPIRATORY (INHALATION) at 08:38

## 2020-11-02 RX ADMIN — IPRATROPIUM BROMIDE 2 PUFF: 17 AEROSOL, METERED RESPIRATORY (INHALATION) at 19:30

## 2020-11-02 RX ADMIN — REMDESIVIR 100 MG: 100 INJECTION, POWDER, LYOPHILIZED, FOR SOLUTION INTRAVENOUS at 11:47

## 2020-11-02 RX ADMIN — LEVOTHYROXINE SODIUM 112 MCG: 0.11 TABLET ORAL at 05:24

## 2020-11-02 RX ADMIN — Medication 500 MG: at 09:22

## 2020-11-02 RX ADMIN — Medication 10 ML: at 19:56

## 2020-11-02 RX ADMIN — ENOXAPARIN SODIUM 30 MG: 30 INJECTION SUBCUTANEOUS at 20:02

## 2020-11-02 RX ADMIN — IPRATROPIUM BROMIDE 2 PUFF: 17 AEROSOL, METERED RESPIRATORY (INHALATION) at 12:37

## 2020-11-02 RX ADMIN — Medication 10 ML: at 09:23

## 2020-11-02 RX ADMIN — SODIUM CHLORIDE 250 ML: 9 INJECTION, SOLUTION INTRAVENOUS at 10:05

## 2020-11-02 RX ADMIN — IPRATROPIUM BROMIDE 2 PUFF: 17 AEROSOL, METERED RESPIRATORY (INHALATION) at 08:37

## 2020-11-02 RX ADMIN — VENLAFAXINE HYDROCHLORIDE 225 MG: 150 CAPSULE, EXTENDED RELEASE ORAL at 09:22

## 2020-11-02 RX ADMIN — DEXAMETHASONE SODIUM PHOSPHATE 20 MG: 4 INJECTION, SOLUTION INTRA-ARTICULAR; INTRALESIONAL; INTRAMUSCULAR; INTRAVENOUS; SOFT TISSUE at 10:05

## 2020-11-02 RX ADMIN — ACETAMINOPHEN 650 MG: 325 TABLET ORAL at 04:13

## 2020-11-02 RX ADMIN — ALBUTEROL SULFATE 2 PUFF: 90 AEROSOL, METERED RESPIRATORY (INHALATION) at 19:31

## 2020-11-02 RX ADMIN — ALBUTEROL SULFATE 2 PUFF: 90 AEROSOL, METERED RESPIRATORY (INHALATION) at 12:36

## 2020-11-02 ASSESSMENT — PAIN SCALES - GENERAL
PAINLEVEL_OUTOF10: 0
PAINLEVEL_OUTOF10: 3

## 2020-11-02 ASSESSMENT — PAIN DESCRIPTION - DESCRIPTORS: DESCRIPTORS: HEADACHE

## 2020-11-02 ASSESSMENT — PAIN DESCRIPTION - PAIN TYPE: TYPE: ACUTE PAIN

## 2020-11-02 ASSESSMENT — PAIN SCALES - WONG BAKER: WONGBAKER_NUMERICALRESPONSE: 0

## 2020-11-02 ASSESSMENT — PAIN DESCRIPTION - LOCATION: LOCATION: HEAD

## 2020-11-02 NOTE — PLAN OF CARE
Problem: Falls - Risk of:  Goal: Will remain free from falls  Description: Will remain free from falls  11/2/2020 0913 by Carolee Cooper RN  Outcome: Ongoing  11/2/2020 0049 by Marcello Baxter RN  Outcome: Ongoing  Goal: Absence of physical injury  Description: Absence of physical injury  11/2/2020 0913 by Carolee Cooper RN  Outcome: Ongoing  11/2/2020 0049 by Marcello Baxter RN  Outcome: Ongoing     Problem: Airway Clearance - Ineffective  Goal: Achieve or maintain patent airway  11/2/2020 0913 by Carolee Cooper RN  Outcome: Ongoing  11/2/2020 0049 by Marcello Baxter RN  Outcome: Ongoing     Problem: Gas Exchange - Impaired  Goal: Absence of hypoxia  11/2/2020 0913 by Carolee Cooper RN  Outcome: Ongoing  11/2/2020 0049 by Marcello Baxter RN  Outcome: Ongoing  Goal: Promote optimal lung function  11/2/2020 0913 by Carolee Cooper RN  Outcome: Ongoing  11/2/2020 0049 by Marcello Baxter RN  Outcome: Ongoing     Problem: Body Temperature -  Risk of, Imbalanced  Goal: Ability to maintain a body temperature within defined limits  11/2/2020 0913 by Carolee Cooper RN  Outcome: Ongoing  11/2/2020 0049 by Marcello Baxter RN  Outcome: Ongoing  Goal: Will regain or maintain usual level of consciousness  11/2/2020 0913 by Carolee Cooper RN  Outcome: Ongoing  11/2/2020 0049 by Marcello Baxter RN  Outcome: Ongoing  Goal: Complications related to the disease process, condition or treatment will be avoided or minimized  11/2/2020 0913 by Carolee Cooper RN  Outcome: Ongoing  11/2/2020 0049 by Marcello Baxter RN  Outcome: Ongoing     Problem: Loneliness or Risk for Loneliness Patient will zoom family this shift.    Goal: Demonstrate positive use of time alone when socialization is not possible  11/2/2020 0913 by Carolee Cooper RN  Outcome: Ongoing  11/2/2020 0049 by Marcello Baxter RN  Outcome: Ongoing

## 2020-11-02 NOTE — PROGRESS NOTES
Progress Note - Dr. Sixto Huertas - Internal Medicine  PCP: Sheila Sutton MD [address] / [place] [id number] [address] Day: 2  Code Status: Full Code  Current Diet: DIET GENERAL;        CC: follow up on medical issues    Subjective:   Rich Srinivasan is a 67 y.o. female. She denies new problems    Some low sats again overnight  Remains on vapotherm    She denies chest pain, complains of shortness of breath, denies nausea,  denies emesis. 10 system Review of Systems is reviewed with patient, and pertinent positives are noted in HPI above . Otherwise, Review of systems is negative. I have reviewed the patient's medical and social history in detail and updated the computerized patient record. To recap: She  has a past medical history of Back pain, Cancer ([address] Headache, Hypothyroidism, Migraines, Thyroid disease, and UTI (lower urinary tract infection). . She  has a past surgical history that includes Bladder surgery (08/2016) and Mohs surgery (01/10/2019). . She  reports that she has quit smoking. She has never used smokeless tobacco. She reports current alcohol use. Gabriel Callahan         Active Hospital Problems    Diagnosis Date Noted    COVID-19 [U07.1] 10/31/2020    COVID-19 virus infection [U07.1] 10/31/2020    Depression [F32.9] 10/31/2020    Hypothyroid [E03.9] 10/31/2020       Current Facility-Administered Medications: dexamethasone (DECADRON) 20 mg in sodium chloride 0.9 % IVPB, 20 mg, Intravenous, Daily  [COMPLETED] remdesivir 200 mg in sodium chloride 0.9 % 250 mL IVPB, 200 mg, Intravenous, Once **FOLLOWED BY** remdesivir 100 mg in sodium chloride 0.9 % 250 mL IVPB, 100 mg, Intravenous, Q24H  0.9 % sodium chloride bolus, 30 mL, Intravenous, PRN  enoxaparin (LOVENOX) injection 30 mg, 30 mg, Subcutaneous, BID  albuterol sulfate  (90 Base) MCG/ACT inhaler 2 puff, 2 puff, Inhalation, Q6H PRN  ALPRAZolam (XANAX) tablet 0.5 mg, 0.5 mg, Oral, Nightly PRN  levothyroxine (SYNTHROID) tablet 112 mcg, 112 mcg, Oral, Daily  venlafaxine (EFFEXOR XR) extended release capsule 225 mg, 225 mg, Oral, Daily  aspirin EC tablet 81 mg, 81 mg, Oral, Daily  calcium elemental (OSCAL) tablet 500 mg, 500 mg, Oral, Daily  sodium chloride flush 0.9 % injection 10 mL, 10 mL, Intravenous, 2 times per day  sodium chloride flush 0.9 % injection 10 mL, 10 mL, Intravenous, PRN  acetaminophen (TYLENOL) tablet 650 mg, 650 mg, Oral, Q6H PRN **OR** acetaminophen (TYLENOL) suppository 650 mg, 650 mg, Rectal, Q6H PRN  magnesium hydroxide (MILK OF MAGNESIA) 400 MG/5ML suspension 30 mL, 30 mL, Oral, Daily PRN  promethazine (PHENERGAN) tablet 12.5 mg, 12.5 mg, Oral, Q6H PRN **OR** ondansetron (ZOFRAN) injection 4 mg, 4 mg, Intravenous, Q6H PRN  0.9 % sodium chloride bolus, 500 mL, Intravenous, PRN  potassium chloride (KLOR-CON M) extended release tablet 40 mEq, 40 mEq, Oral, PRN **OR** potassium bicarb-citric acid (EFFER-K) effervescent tablet 40 mEq, 40 mEq, Oral, PRN **OR** potassium chloride 10 mEq/100 mL IVPB (Peripheral Line), 10 mEq, Intravenous, PRN  labetalol (NORMODYNE;TRANDATE) injection 10 mg, 10 mg, Intravenous, Q6H PRN  albuterol sulfate  (90 Base) MCG/ACT inhaler 2 puff, 2 puff, Inhalation, 4x daily **AND** ipratropium (ATROVENT HFA) 17 MCG/ACT inhaler 2 puff, 2 puff, Inhalation, 4x daily **AND** MDI Treatment, , , 4x daily         Objective:  /67   Pulse 56   Temp 99 °F (37.2 °C) (Axillary)   Resp 22   Ht 5' 4\" (1.626 m)   Wt 173 lb 11.2 oz (78.8 kg)   SpO2 93%   BMI 29.82 kg/m²      Patient Vitals for the past 24 hrs:   BP Temp Temp src Pulse Resp SpO2   11/02/20 0544 126/67 99 °F (37.2 °C) Axillary 56 22 93 %   11/02/20 0424 -- -- -- -- 22 90 %   11/02/20 0421 -- -- -- -- -- (!) 88 %   11/02/20 0415 -- -- -- -- -- (!) 87 %   11/02/20 0410 135/76 98.7 °F (37.1 °C) Oral 76 16 --   11/02/20 0316 -- -- -- -- 18 90 %   11/02/20 0011 -- -- -- -- 20 90 %   11/01/20 2329 134/81 97.8 °F (36.6 °C) Oral 87 20 90 %   11/01/20 2226 -- -- -- -- -- 92 %   11/01/20 2120 113/66 98.7 °F (37.1 °C) Oral 89 20 92 %   11/01/20 2044 -- -- -- -- 20 92 %   11/01/20 2018 124/72 98.2 °F (36.8 °C) Oral 94 20 91 %   11/01/20 1748 115/72 98.2 °F (36.8 °C) Oral 88 20 91 %   11/01/20 1733 118/74 98.1 °F (36.7 °C) -- 89 20 --   11/01/20 1635 -- -- -- -- 20 92 %   11/01/20 1625 137/88 98.2 °F (36.8 °C) Oral 83 18 91 %   11/01/20 1515 116/72 98.2 °F (36.8 °C) Oral 91 20 90 %   11/01/20 1500 116/67 98.4 °F (36.9 °C) Oral 93 20 91 %   11/01/20 1241 -- -- -- -- 20 92 %   11/01/20 0921 -- -- -- -- 20 92 %   11/01/20 0858 -- -- -- -- 18 (!) 89 %   11/01/20 0838 122/74 98.3 °F (36.8 °C) Oral 68 18 90 %     Patient Vitals for the past 96 hrs (Last 3 readings):   Weight   11/01/20 0000 173 lb 11.2 oz (78.8 kg)   10/31/20 1443 170 lb (77.1 kg)           Intake/Output Summary (Last 24 hours) at 11/2/2020 0830  Last data filed at 11/2/2020 0416  Gross per 24 hour   Intake 837.08 ml   Output 700 ml   Net 137.08 ml         Physical Exam:   /67   Pulse 56   Temp 99 °F (37.2 °C) (Axillary)   Resp 22   Ht 5' 4\" (1.626 m)   Wt 173 lb 11.2 oz (78.8 kg)   SpO2 93%   BMI 29.82 kg/m²   General appearance: alert, appears stated age and cooperative  Head: Normocephalic, without obvious abnormality, atraumatic  Lungs: rales in bases.  No wheezing  Heart: regular rate and rhythm, S1, S2 normal, no murmur, click, rub or gallop  Abdomen: soft, non-tender; bowel sounds normal; no masses,  no organomegaly  Extremities: extremities normal, atraumatic, no cyanosis or edema  Skin: Skin color, texture, turgor normal. No rashes or lesions    Labs:  Lab Results   Component Value Date    WBC 5.9 11/02/2020    HGB 12.8 11/02/2020    HCT 38.4 11/02/2020     11/02/2020    ALT 21 11/02/2020    AST 26 11/02/2020     11/02/2020    K 3.5 11/02/2020     11/02/2020    CREATININE 0.5 (L) 11/02/2020    BUN 16 11/02/2020    CO2 24 11/02/2020    INR 1.10 10/31/2020    LABMICR YES 10/31/2020     Lab Results   Component Value Date    TROPONINI <0.01 11/01/2020       Recent Imaging Results are Reviewed:  Xr Chest Portable    Result Date: 11/1/2020  EXAMINATION: ONE XRAY VIEW OF THE CHEST 11/1/2020 5:06 am COMPARISON: Chest radiograph 10/31/2020 HISTORY: ORDERING SYSTEM PROVIDED HISTORY: Increase O2 demands TECHNOLOGIST PROVIDED HISTORY: Reason for exam:->Increase O2 demands Reason for Exam: Increase O2 demands Acuity: Unknown Type of Exam: Unknown FINDINGS: No substantial change in peripheral, mid and lower lung predominant airspace disease. No pneumothorax or pleural effusion. No new focal airspace disease. Stable cardiomediastinal contours, partially obscured. Intact skeleton. No acute interval change. Bilateral airspace disease is similar to prior exam.     Xr Chest Portable    Result Date: 10/31/2020  EXAMINATION: ONE XRAY VIEW OF THE CHEST 10/31/2020 3:29 pm COMPARISON: None. HISTORY: ORDERING SYSTEM PROVIDED HISTORY: SOB TECHNOLOGIST PROVIDED HISTORY: Reason for exam:->SOB Reason for Exam: COVID+, low O2 sats Acuity: Acute Type of Exam: Initial FINDINGS: The cardiac silhouette is mildly prominent. Multifocal ground-glass opacification, particularly in the perihilar regions and left lung base. There is no pleural fluid. There is sparing of the lung apices. Multifocal ground-glass opacification likely pneumonia including atypical viral pneumonia. Pulmonary edema is a less likely etiology for the findings. Assessment and Plan:  Principal Problem:    Acute respiratory failure due to COVID-19 virus infection -Established problem. Stable. Still on high flow vapotherm. Plan: cont iv decadron. Remdesivir/plasma ordered 11/1  Active Problems:    COVID-19 -Established problem. Stable. Plan: as above    Depression -Established problem. Stable. Plan: cont home meds    Hypothyroid -Established problem. Stable.   No sx noted  Plan: on synthroid    Total critical care time caring for this patient with life threatening illness, including direct patient contact, management of life support systems, review of data including imaging and labs, discussions with other team members and physicians at least 35 minutes today              Kwadwo Waite  11/2/2020

## 2020-11-02 NOTE — PROGRESS NOTES
Called [facility] to give report for patient to get transferred to [facility]. Will transfer patient as soon as room is clean.

## 2020-11-02 NOTE — PROGRESS NOTES
Per hospitalist, patient does not need to be transferred to higher level of care currently. \"Continue vapotherm for now, if saturation is 88% or higher. If not, we can try BiPAP. \" Will continue to monitor.

## 2020-11-02 NOTE — PROGRESS NOTES
Called patients family with patient on facetime. Patient is resting now, will transfer to ICU when bed is ready.

## 2020-11-02 NOTE — PROGRESS NOTES
Talked with patients daughter Marvin Gibson on the phone. Set up a zoom call with Marvin Gibson and patients son for 1:00 today. Shift assessment completed. Routine vitals stable. Scheduled medications given. Patient is awake, alert and oriented. Respirations are easy and unlabored. Patient does not appear to be in distress, resting comfortably at this time. Call light within reach. Repositioned patient to be on her side to help her breathe better.

## 2020-11-02 NOTE — PROGRESS NOTES
101.7 and leukopenia with white cell count of 3200. She was started on IV remdesivir by Dr. Johnson Dougherty. She was also given 2 units of COVID-19 convalescent plasma and is on IV Decadron. Her oxygen requirement has worsened significantly despite aggressive management for COVID-19. She is now on 60 L oxygen via high flow nasal cannula. She was on 2 L oxygen via nasal cannula 48 hours ago    Serum creatinine 0.5    Chest x-ray is reviewed. Showed bilateral atypical lung infiltrates, classic for COVID-19    Plan:     Diagnostic Workup:    Agree with checking baseline pro calcitonin level, troponin level, lactate, d-dimer, LDH level  Will order every other day LDH level and d-dimer, which may help with prognostication (Reference: [address] [facility]. Published online March 13, 2020. doi:10.1001/jamainternmed.2020.0994). We will also order pro calcitonin and CRP  level every other day for now  2 sets of blood cultures from different sites for thoroughness of work-up  Will order patient's blood group type and screen stat for COVID 19 convalescent plasma   Chest x-ray reviewed. Due to exposure risk, will avoid doing CT scan of the chest or 2 view chest x-ray due to limited utility        Antimicrobials:     Agree with IV remdesivir. Continue IV remdesivir 100 mg every 24 hours. She is received a loading dose yesterday   The patient has received 2 units of COVID-19 convalescent plasma already   Agree with stopping ceftriaxone and azithromycin. No role of antibiotics in COVID-19. No evidence of secondary or concomitant bacterial infection   Continue high flow oxygen support to maintain oxygen saturation above 92%   Continue IV Decadron 20 mg daily per pharmacy protocol   Maintain good glycemic control while on Decadron and remdesivir   Remdesivir [facility]) is the first and only treatment currently approved by FDA for treatment of COVID-19. treatment of hospitalized patients with COVID 19 on August 23, 2020 (kstattoo.com). There is still insufficient data for the NIH panel to recommend either for or against its use. It should not be considered standard of care for the treatment of patients with COVID-19 according to the Rookopli 96 19 treatment panel.  Interleukin-6 inhibitor, Tocilizumab (Actemra) was previously proposed in severely ill patients, particularly those with very high interleukin-6 levels. However, NIH COVID-19 treatment  panel has determined that there is insufficient data to recommend either for or against the use of interleukin 1 inhibitors, interleukin-6 inhibitors and interferon beta for treatment of COVID 19 (CreditClassification.com.pt). Recently released update on the phase 3 COVACTA trial, which is the first global, randomised, double-blind, placebo-controlled phase III trial investigating Actemra/RoActemra in hospitalized patients with severe COVID 19 respiratory pneumonia did not meet its primary endpoint of improved clinical status in patients with COVID-19 associated pneumonia, or the key secondary endpoint of reduced patient mortality. (LocalLeaderz.HourVille.cy)   FDA has withdrawn its EUA for hydroxychloroquine as a treatment modality for COVID 19 on Kassidy 15, 2020. In 2 small, uncontrolled studies conducted in [place] and [place], hydroxychloroquine and its congener, chloroquine were previously reported to be effective against COVID-19. However, International Society of Antimicrobial Chemotherapy subsequently declared that the trial did not meet the Societys expected standard.  (RAJEEV Netw Open. 2020;3(4):x766445. ORD:82.0317/KCUDSBUVJFGLGHX.8686.5385).  A large recently published observational study in [facility] did not show any benefit of hydroxychloroquine in reducing intubation rates or mortality in COVID 19 patients Nir Parikh [facility]. 2020 May 7. doi: 10.1056/WJMHno8608385). A large randomized controlled trial called the \"RECOVERY trial\"  conducted in the [facility] has not shown hydroxychloroquine to be beneficial in hospitalized COVID 19 patients. Although, one retrospective study published in IJID indicated potential benefit of hydroxychloroquine and hydroxychloroquine plus azithromycin combination   https://Muzzley/) , it had several flaws including retrospective, nonrandomized design, concomitant use of steroids in most patients and exclusion of 10% of patients were still hospitalized at the time the study was ended, potentially skewing the results.  In a recently published study, hydroxychloroquine has also failed to show benefit as a postexposure prophylaxis for COVID 19 within 4 days after exposure (https://shea.org/. org/doi/full/10.1056/OKWMdh3359802)   The NIH COVID-19 Treatment Guidelines Panel now recommends against using hydroxychloroquine plus azithromycin for the treatment of COVID-19, except in a clinical trial (AIII) (MetaFarms.co.uk). The Panel recommends against the use of chloroquine or hydroxychloroquine for the treatment of COVID-19, except in a clinical trial (AII). The Panel recommends against the use of high-dose chloroquine (600 mg twice daily for 10 days) for the treatment of COVID-19 (AI).  Coadministration of Remdesvir and chloroquine phosphate or hydroxychloroquine sulfate is not recommended based on in vitro data demonstrating an antagonistic effect of chloroquine on the intracellular metabolic activation and antiviral activity of Remdesevir. (Reference: Remdesevir package insert)  · Lopinavir/ritonavir trial has failed to show benefits in the recently published trial in [facility] (Reference:N Engl J Med. 2020 Mar 18. doi: 10.1056/YAZTmc3614060). However, this trial was under-powered.  Except in the context of a clinical trial, the COVID-19 Treatment Guidelines Panel recommends against using lopinavir/ritonavir (AI) or other HIV protease inhibitors (AIII) for the treatment of COVID-19. (https://álvarezXO1.com/  · The NIH panel recommends against the use of Interferons (consuelo or beta), Bruton's tyrosine kinase inhibitors and Janus kinase inhibitors (AIII) for the treatment of COVID-19, except in a clinical trial (Soma.Fon.pt). The Panel recommends against the use of mesenchymal stem cells for the treatment of COVID-19, except in a clinical trial (AII).  Recommendations for routine VTE prophylaxis are the same for hospitalized pregnant and nonpregnant patients (AIII).  There is insufficient data about use of vitamin C, vitamin D, zinc in COVID 19. NIH guidelines recommend against use of statins, ACE inhibitor/ARB's solely for the purpose of treating COVID 19, if not otherwise clinically indicated for other medical reasons. IDSA panel suggests against famotidine use for the sole purpose of treating COVID-19 in hospitalized patients, outside of the context of a clinical trial .    Continue strict droplet plus isolation currently being used for COVID-19 infections.  Recommend close vitals monitoring.  Continue oxygen support to try to maintain oxygen saturation above 92%   Fall and aspiration precautions.  Discussed above plan with RN.  Critically ill patient. High risk of morbidity and mortality   Continue close monitoring in COVID 19 unit. Drug Monitoring:    · Continue monitoring for antibiotic toxicity as follows: CBC, CMP  · Continue to watch for following: new or worsening fever, new hypotension, hives, lip swelling and redness or purulence at vascular access sites.      Current isolation precautions:    Currently active isolation(s): Droplet Plus, C Diff Contact     I/v access Management:    · Continue to monitor i.v access sites for erythema, induration,discharge or tenderness. · As always, continue efforts to minimize tubes/ lines/ drains as clinically appropriate to reduce chances of line associated infections. Risk of Complications/Morbidity/Mortality: High     · Patient is critically ill and has a potentially life threatening infection that poses threat to life/bodily function. · There is potential for worsening infection/ sudden clinically significant or life-threatening deterioration in the patient's condition without appropriate antimicrobial therapy. · Complex medical decision making process was involved to select appropriate antimicrobial agents to reverse the cause of patient's severe infection/ illness. · Antimicrobial therapy requires intensive monitoring for toxicity and frequent dose adjustments to prevent toxicity and permanent end-organ dysfunction. Critical care time:31 minutes      Thank you for involving me in the care of your patient. I will continue to follow. If you have any additional questions, please do not hesitate to contact me. Subjective: Interval history: Interval history was obtained from chart review and RN. The patient is quite sick. Her oxygen requirement has worsened significantly. She is now on 60 L oxygen via heated high flow nasal cannula. She is tolerating remdesivir. REVIEW OF SYSTEMS:      Review of Systems   Unable to perform ROS: Acuity of condition         Past Medical History: All past medical history reviewed today. Past Medical History:   Diagnosis Date    Back pain     Cancer ([address]     squamous cell carcinoma and basal cell carcinoma    Headache     Hypothyroidism     Migraines     Thyroid disease     UTI (lower urinary tract infection)     treated w/Bactrim per PT.         Past Surgical History: All past surgical history was reviewed today. Past Surgical History:   Procedure Laterality Date    BLADDER SURGERY  08/2016    restructured bladder     MOHS SURGERY  01/10/2019    right jawline anterior inferior       Family History: All family history was reviewed today. Problem Relation Age of Onset    Heart Disease Daughter        Objective:       PHYSICAL EXAM:      Vitals:   Vitals:    11/02/20 0840 11/02/20 0916 11/02/20 1208 11/02/20 1237   BP:  (!) 153/79 136/75    Pulse:  68 71    Resp: 24 24 22    Temp:  98.2 °F (36.8 °C) 98.4 °F (36.9 °C)    TempSrc:  Oral Oral    SpO2: 93% 92% 91% 93%   Weight:       Height:           Physical Exam       PHYSICAL EXAM:     In-person bedside physical examination deferred. Pursuant to the emergency declaration under the [address] [place] [address] authority and the [employer] Act, this clinical encounter was conducted to provide necessary medical care. (Also consistent with new provisions and guidance offered by [facility] on March 18, 2020 in setting of COVID 19 outbreak and in order to preserve personal protective equipment in accordance with the flexibilities announced by CMS on March 30, 2020)   References: https://[facility]. [facility]/Portals/0/Resources/COVID-19/3_18%20Telemed%20Guidance%20Updated%20March%2018. pdf?wxi=[email or web address]                      https://[facility]. [facility]/Portals/0/Resources/COVID-19/3_18%20Telemed%20Guidance%20Updated%20March%2018. pdf?nbg=[email or web address]                      http://kinkon.[employer]/. pdf                            General: Awake and oriented to time place and person according to primary service, vitals reviewed  HEENT: Deferred  Cardiovascular: Telemetry data reviewed, rest deferred   Pulmonary: deferred  Abdomen/GI: deferred  Neuro: deferred  Skin: deferred  Musculoskeletal:  deferred  Genitourinary: Deferred  Psych: deferred  Lymphatic/Immunologic: deferred    Intake and output:    I/O last 3 completed shifts: In: 837.1 [P.O.:240; Blood:597.1]  Out: 0 [Urine:700]    Lab Data:   All available labs and old records have been reviewed by me. CBC:  Recent Labs     10/31/20  1512 11/01/20  0542 11/02/20  0530   WBC 4.8 3.2* 5.9   RBC 4.57 4.44 4.26   HGB 14.0 13.5 12.8   HCT 41.2 39.9 38.4    191 231   MCV 90.2 89.9 90.1   MCH 30.6 30.3 30.1   MCHC 33.9 33.7 33.4   RDW 13.3 13.4 13.6        BMP:  Recent Labs     10/31/20  1512 11/01/20  0542 11/02/20  0530    138 140   K 3.4* 3.3* 3.5    103 104   CO2 23 23 24   BUN 10 11 16   CREATININE 0.5* <0.5* 0.5*   CALCIUM 8.3 8.3 8.7   GLUCOSE 126* 133* 147*        Hepatic Function Panel:   Lab Results   Component Value Date    ALKPHOS 72 11/02/2020    ALT 21 11/02/2020    AST 26 11/02/2020    PROT 6.0 11/02/2020    BILITOT 0.3 11/02/2020    LABALBU 3.0 11/02/2020       CPK: No results found for: CKTOTAL  ESR: No results found for: SEDRATE  CRP:   Lab Results   Component Value Date    .1 (H) 11/01/2020           Imaging: All pertinent images and reports for the current visit were reviewed by me during this visit. XR CHEST PORTABLE   Final Result   No acute interval change. Bilateral airspace disease is similar to prior   exam.         XR CHEST PORTABLE   Final Result   Multifocal ground-glass opacification likely pneumonia including atypical   viral pneumonia. Pulmonary edema is a less likely etiology for the findings. XR CHEST PORTABLE    (Results Pending)       Medications: All current and past medications were reviewed.      dexamethasone  20 mg Intravenous Daily    remdesivir IVPB  100 mg Intravenous Q24H    enoxaparin  30 mg Subcutaneous BID    levothyroxine  112 mcg Oral Daily    venlafaxine  225 mg Oral Daily    aspirin  81 mg Oral Daily    calcium elemental  500 mg Oral Daily    sodium chloride flush  10 mL

## 2020-11-02 NOTE — PROGRESS NOTES
Pt transferred to [facility]. Lyndon Vasquez notified of transfer to bed 01.

## 2020-11-02 NOTE — PROGRESS NOTES
[facility] Pulmonary/CCM Progress note      Admit Date: 10/31/2020    Chief Complaint: Shortness of breath    Subjective: Interval History: Patient continues to be short of breath, though unlabored. Currently on Airvo 100%/60L. O2 sats in the low 90s. No significant phlegm with the cough. Hemodynamically stable. Significant hypoxia particularly with turning.     Scheduled Meds:   dexamethasone  20 mg Intravenous Daily    remdesivir IVPB  100 mg Intravenous Q24H    enoxaparin  30 mg Subcutaneous BID    levothyroxine  112 mcg Oral Daily    venlafaxine  225 mg Oral Daily    aspirin  81 mg Oral Daily    calcium elemental  500 mg Oral Daily    sodium chloride flush  10 mL Intravenous 2 times per day    albuterol sulfate HFA  2 puff Inhalation 4x daily    And    ipratropium  2 puff Inhalation 4x daily     Continuous Infusions:  PRN Meds:sodium chloride, albuterol sulfate HFA, ALPRAZolam, sodium chloride flush, acetaminophen **OR** acetaminophen, magnesium hydroxide, promethazine **OR** ondansetron, sodium chloride, potassium chloride **OR** potassium alternative oral replacement **OR** potassium chloride, labetalol    Review of Systems  Constitutional: negative for fatigue, fevers, malaise and weight loss  Ears, nose, mouth, throat: negative for ear drainage, epistaxis, hoarseness, nasal congestion, sore throat and voice change  Respiratory: negative except for cough and shortness of breath  Cardiovascular: negative for chest pain, chest pressure/discomfort, irregular heart beat, lower extremity edema and palpitations  Gastrointestinal: negative for abdominal pain, constipation, diarrhea, jaundice, melena, odynophagia, reflux symptoms and vomiting  Hematologic/lymphatic: negative for bleeding, easy bruising, lymphadenopathy and petechiae  Musculoskeletal:negative for arthralgias, bone pain, muscle weakness, neck pain and stiff joints  Neurological: negative for dizziness, gait problems, headaches, seizures, speech problems, tremors and weakness  Behavioral/Psych: negative for anxiety, behavior problems, depression, fatigue and sleep disturbance  Endocrine: negative for diabetic symptoms including none, neuropathy, polyphagia, polyuria, polydipsia, vomiting and diarrhea and temperature intolerance  Allergic/Immunologic: negative for anaphylaxis, angioedema, hay fever and urticaria    Objective:     Patient Vitals for the past 8 hrs:   BP Temp Temp src Pulse Resp SpO2   11/02/20 0916 (!) 153/79 98.2 °F (36.8 °C) Oral 68 24 92 %   11/02/20 0840 -- -- -- -- 24 93 %   11/02/20 0544 126/67 99 °F (37.2 °C) Axillary 56 22 93 %   11/02/20 0424 -- -- -- -- 22 90 %   11/02/20 0421 -- -- -- -- -- (!) 88 %   11/02/20 0415 -- -- -- -- -- (!) 87 %   11/02/20 0410 135/76 98.7 °F (37.1 °C) Oral 76 16 --     I/O last 3 completed shifts: In: 837.1 [P.O.:240; Blood:597.1]  Out: 0 [Urine:700]  I/O this shift:  In: -   Out: 175 [Urine:175]    Due to the current efforts to prevent transmission of COVID-19 and also the need to preserve PPE for other caregivers, a face-to-face encounter with the patient was not performed. That being said, all relevant records and diagnostic tests were reviewed, including laboratory results and imaging. Please reference any relevant documentation elsewhere. Care will be coordinated with the primary service. Data Review:  CBC:   Lab Results   Component Value Date    WBC 5.9 11/02/2020    RBC 4.26 11/02/2020     BMP:   Lab Results   Component Value Date    GLUCOSE 147 11/02/2020    CO2 24 11/02/2020    BUN 16 11/02/2020    CREATININE 0.5 11/02/2020    CALCIUM 8.7 11/02/2020     ABG:   Lab Results   Component Value Date    ROK1CCH 23.3 11/01/2020    BEART -1 11/01/2020    J5ZYIIFE 94 11/01/2020    PHART 7.433 11/01/2020    RKW8UJG 34.8 11/01/2020    PO2ART 68.8 11/01/2020    ZVB8MUC 24 11/01/2020       Radiology: All pertinent images / reports were reviewed as a part of this visit.     Narrative EXAMINATION:    ONE XRAY VIEW OF THE CHEST         11/1/2020 5:06 am         COMPARISON:    Chest radiograph 10/31/2020         HISTORY:    ORDERING SYSTEM PROVIDED HISTORY: Increase O2 demands    TECHNOLOGIST PROVIDED HISTORY:    Reason for exam:->Increase O2 demands    Reason for Exam: Increase O2 demands    Acuity: Unknown    Type of Exam: Unknown         FINDINGS:    No substantial change in peripheral, mid and lower lung predominant airspace    disease.  No pneumothorax or pleural effusion.  No new focal airspace    disease.  Stable cardiomediastinal contours, partially obscured.  Intact    skeleton.              Impression    No acute interval change.  Bilateral airspace disease is similar to prior    exam.          Problem List:   Acute hypoxic respiratory failure  COVID-19 pneumonia  ARDS    Assessment/Plan:     Refractory hypoxemia, maximal therapy with high flow oxygen. Might need to use BiPAP, no increased work of breathing but need to be closely monitored in an ICU setting for need for intubation. Chest x-ray shows bilateral infiltrates, repeat x-ray today. Patient was recently admitted to [address] [place] [address] between 10/27 and 10/29 for COVID-19 pneumonia-treated temporarily with Rocephin and Zithromax. Currently procalcitonin low at 0.09.    D-dimer 326, fibrinogen 607. Continue remdesivir and Decadron. Patient also received convalescent plasma. On Lovenox 30 mg twice daily. Recommend transfer to ICU for close monitoring and for assessment of needs for intubation. Could try BiPAP to see if this improves oxygenation.     Primitivo Sinclair MD

## 2020-11-02 NOTE — PROGRESS NOTES
Patient's O2 sats were 87% on 40L of vapotherm. Vapotherm increased to 60L. O2 sats now between 90-93%. Encouraged patient to prone. Spoke with Dr. Neo Medina, Dr. Neo Medina advised to refer to hospitalist on decision to transfer patient to higher level of care or not.

## 2020-11-03 LAB
A/G RATIO: 0.9 (ref 1.1–2.2)
ALBUMIN SERPL-MCNC: 2.8 G/DL (ref 3.4–5)
ALP BLD-CCNC: 70 U/L (ref 40–129)
ALT SERPL-CCNC: 19 U/L (ref 10–40)
ANION GAP SERPL CALCULATED.3IONS-SCNC: 11 MMOL/L (ref 3–16)
AST SERPL-CCNC: 27 U/L (ref 15–37)
BASOPHILS ABSOLUTE: 0 K/UL (ref 0–0.2)
BASOPHILS RELATIVE PERCENT: 0.1 %
BILIRUB SERPL-MCNC: 0.4 MG/DL (ref 0–1)
BUN BLDV-MCNC: 22 MG/DL (ref 7–20)
CALCIUM SERPL-MCNC: 8.6 MG/DL (ref 8.3–10.6)
CHLORIDE BLD-SCNC: 106 MMOL/L (ref 99–110)
CO2: 24 MMOL/L (ref 21–32)
CREAT SERPL-MCNC: <0.5 MG/DL (ref 0.6–1.2)
EOSINOPHILS ABSOLUTE: 0 K/UL (ref 0–0.6)
EOSINOPHILS RELATIVE PERCENT: 0 %
GFR AFRICAN AMERICAN: >60
GFR NON-AFRICAN AMERICAN: >60
GLOBULIN: 3.1 G/DL
GLUCOSE BLD-MCNC: 141 MG/DL (ref 70–99)
HCT VFR BLD CALC: 38.2 % (ref 36–48)
HEMOGLOBIN: 12.9 G/DL (ref 12–16)
LYMPHOCYTES ABSOLUTE: 0.7 K/UL (ref 1–5.1)
LYMPHOCYTES RELATIVE PERCENT: 9.2 %
MCH RBC QN AUTO: 30.5 PG (ref 26–34)
MCHC RBC AUTO-ENTMCNC: 33.8 G/DL (ref 31–36)
MCV RBC AUTO: 90.3 FL (ref 80–100)
MONOCYTES ABSOLUTE: 0.7 K/UL (ref 0–1.3)
MONOCYTES RELATIVE PERCENT: 9.3 %
NEUTROPHILS ABSOLUTE: 6.3 K/UL (ref 1.7–7.7)
NEUTROPHILS RELATIVE PERCENT: 81.4 %
PDW BLD-RTO: 13.6 % (ref 12.4–15.4)
PLATELET # BLD: 253 K/UL (ref 135–450)
PMV BLD AUTO: 7.8 FL (ref 5–10.5)
POTASSIUM SERPL-SCNC: 3.7 MMOL/L (ref 3.5–5.1)
RBC # BLD: 4.23 M/UL (ref 4–5.2)
SODIUM BLD-SCNC: 141 MMOL/L (ref 136–145)
TOTAL PROTEIN: 5.9 G/DL (ref 6.4–8.2)
WBC # BLD: 7.7 K/UL (ref 4–11)

## 2020-11-03 PROCEDURE — 2580000003 HC RX 258: Performed by: INTERNAL MEDICINE

## 2020-11-03 PROCEDURE — 94640 AIRWAY INHALATION TREATMENT: CPT

## 2020-11-03 PROCEDURE — 99291 CRITICAL CARE FIRST HOUR: CPT | Performed by: INTERNAL MEDICINE

## 2020-11-03 PROCEDURE — 6360000002 HC RX W HCPCS: Performed by: INTERNAL MEDICINE

## 2020-11-03 PROCEDURE — 36415 COLL VENOUS BLD VENIPUNCTURE: CPT

## 2020-11-03 PROCEDURE — 2500000003 HC RX 250 WO HCPCS: Performed by: INTERNAL MEDICINE

## 2020-11-03 PROCEDURE — 94761 N-INVAS EAR/PLS OXIMETRY MLT: CPT

## 2020-11-03 PROCEDURE — 80053 COMPREHEN METABOLIC PANEL: CPT

## 2020-11-03 PROCEDURE — 85025 COMPLETE CBC W/AUTO DIFF WBC: CPT

## 2020-11-03 PROCEDURE — 2700000000 HC OXYGEN THERAPY PER DAY

## 2020-11-03 PROCEDURE — 6370000000 HC RX 637 (ALT 250 FOR IP): Performed by: INTERNAL MEDICINE

## 2020-11-03 PROCEDURE — 2000000000 HC ICU R&B

## 2020-11-03 RX ADMIN — ALBUTEROL SULFATE 2 PUFF: 90 AEROSOL, METERED RESPIRATORY (INHALATION) at 14:55

## 2020-11-03 RX ADMIN — VENLAFAXINE HYDROCHLORIDE 225 MG: 150 CAPSULE, EXTENDED RELEASE ORAL at 08:58

## 2020-11-03 RX ADMIN — IPRATROPIUM BROMIDE 2 PUFF: 17 AEROSOL, METERED RESPIRATORY (INHALATION) at 14:55

## 2020-11-03 RX ADMIN — ALBUTEROL SULFATE 2 PUFF: 90 AEROSOL, METERED RESPIRATORY (INHALATION) at 19:28

## 2020-11-03 RX ADMIN — IPRATROPIUM BROMIDE 2 PUFF: 17 AEROSOL, METERED RESPIRATORY (INHALATION) at 19:28

## 2020-11-03 RX ADMIN — IPRATROPIUM BROMIDE 2 PUFF: 17 AEROSOL, METERED RESPIRATORY (INHALATION) at 07:52

## 2020-11-03 RX ADMIN — LEVOTHYROXINE SODIUM 112 MCG: 0.11 TABLET ORAL at 08:59

## 2020-11-03 RX ADMIN — ALPRAZOLAM 0.5 MG: 0.5 TABLET ORAL at 20:00

## 2020-11-03 RX ADMIN — ASPIRIN 81 MG: 81 TABLET, COATED ORAL at 08:58

## 2020-11-03 RX ADMIN — ALBUTEROL SULFATE 2 PUFF: 90 AEROSOL, METERED RESPIRATORY (INHALATION) at 07:52

## 2020-11-03 RX ADMIN — ALBUTEROL SULFATE 2 PUFF: 90 AEROSOL, METERED RESPIRATORY (INHALATION) at 12:18

## 2020-11-03 RX ADMIN — DEXAMETHASONE SODIUM PHOSPHATE 20 MG: 4 INJECTION, SOLUTION INTRA-ARTICULAR; INTRALESIONAL; INTRAMUSCULAR; INTRAVENOUS; SOFT TISSUE at 08:58

## 2020-11-03 RX ADMIN — ENOXAPARIN SODIUM 30 MG: 30 INJECTION SUBCUTANEOUS at 10:32

## 2020-11-03 RX ADMIN — Medication 10 ML: at 20:01

## 2020-11-03 RX ADMIN — REMDESIVIR 100 MG: 100 INJECTION, POWDER, LYOPHILIZED, FOR SOLUTION INTRAVENOUS at 10:32

## 2020-11-03 RX ADMIN — Medication 500 MG: at 08:58

## 2020-11-03 RX ADMIN — IPRATROPIUM BROMIDE 2 PUFF: 17 AEROSOL, METERED RESPIRATORY (INHALATION) at 12:18

## 2020-11-03 RX ADMIN — ENOXAPARIN SODIUM 30 MG: 30 INJECTION SUBCUTANEOUS at 20:00

## 2020-11-03 ASSESSMENT — PAIN SCALES - GENERAL
PAINLEVEL_OUTOF10: 0

## 2020-11-03 ASSESSMENT — PAIN SCALES - WONG BAKER
WONGBAKER_NUMERICALRESPONSE: 0

## 2020-11-03 NOTE — ADT AUTH CERT
COVID 19 by Inocente Kern RN         Review Status  Review Entered    In Primary  11/2/2020 11:45        Criteria Review    The illness suspected to be related to the Coronavirus (COVID-19)? Yes  Has the member been tested for the COVID-19? Yes  Prior to this admission on 10/27/2020     If Yes, what are the results of the COVID-19?               Positive     What is the severity of the members condition  Droplet Isolation  O2 NC  Now High Flow O2 increasing demands into 11/2/2020

## 2020-11-03 NOTE — PROGRESS NOTES
Physician Progress Note      PATIENT:               Jack Current  CSN #:                  [id number]  :                       1940  ADMIT DATE:       10/31/2020 2:35 PM  [address] [place] DATE:  RESPONDING  PROVIDER #:        Kathia Robb MD          QUERY TEXT:    Dear Dr. Kaela Kemp,  Pt admitted with worsening dyspnea and hypoxia. Pt reported to have COVID +   test. If possible, please document in the progress notes and discharge summary   if you are evaluating and /or treating any of the following: The medical record reflects the following:  Risk Factors: Prior hospitalization for COVID,  treated for CAP. Clinical Indicators: Patient reports positive COVID test while hospitalized in   the past week. Worsening dyspnea with hypoxia, respiratory failure. Fever on   admission of 101. 7. with WBC of 3.2 and ANC of 2.1, CRP of 192, and Glucose   126 on . CXR ground glass opacity, atypical viral PNA. Treatment: Oxygen, cultures, moved to the ICU for monitoring and possible   intubation, Remdesivir, convalescent plasma, Decadron, ID consult, Pulmonary   consult. Thank you. Bing Oppenheim RN [email or web address]  Options provided:  -- Sepsis, present on admission  -- Sepsis, not present on admission,  -- No Sepsis, pneumonia only  -- Sepsis was ruled out  -- Other - I will add my own diagnosis  -- Disagree - Not applicable / Not valid  -- Disagree - Clinically unable to determine / Unknown  -- Refer to Clinical Documentation Reviewer    PROVIDER RESPONSE TEXT:    This patient has sepsis which was present on admission.     Query created by: Lawyer Lefort on 11/3/2020 2:25 PM      Electronically signed by:  Kathia Robb MD 11/3/2020 3:57 PM

## 2020-11-03 NOTE — PROGRESS NOTES
Infectious Diseases   Progress Note      Admission Date: 10/31/2020  Hospital Day: Hospital Day: 4   Attending: Lanette Cabrera MD  Date of service: 11/3/2020     Chief complaint/ Reason for consult:     · Acute respiratory failure with hypoxia  · COVID-19 pneumonia  · Elevated D-dimer  · Elevated CRP in setting of COVID-19  · Leukopenia in setting of COVID-19    Microbiology:        I have reviewed allavailable micro lab data and cultures    Blood culture (2/2) - collected on 10/31/2020: Negative so far    Antibiotics and immunizations:       Current antibiotics: All antibiotics and their doses were reviewed by me    Recent Abx Admin                   remdesivir 100 mg in sodium chloride 0.9 % 250 mL IVPB (mg) 100 mg New Bag 11/03/20 1032                  Immunization History: All immunization history was reviewed by me today. There is no immunization history on file for this patient. Known drug allergies: All allergies were reviewed and updated    No Known Allergies    Social history:     Social History:  All social andepidemiologic history was reviewed and updated by me today as needed. · Tobacco use:   reports that she has quit smoking. She has never used smokeless tobacco.  · Alcohol use:   reports current alcohol use. · Currently lives in: [address] [place] [address] Dr Carrasco  has no history on file for drug. Assessment:     The patient is a 67 y.o. old female who  has a past medical history of Back pain, Cancer ([address] Essential hypertension, Headache, Hypothyroidism, Migraines, Thyroid disease, and UTI (lower urinary tract infection).  with following problems:    · Acute respiratory failure with hypoxia-ongoing, currently on 60 L oxygen with heated high flow nasal cannula  · COVID-19 pneumonia-has severe pneumonia, ongoing  · Elevated D-dimer  · Elevated CRP in setting of COVID-19-monitor trends  · Leukopenia in setting of COVID-19  · Essential hypertension-blood pressure okay  · Hypothyroidism  · Ex-smoker  · Overweight* due to excess calorie intake : Body mass index is 30.36 kg/m². Discussion:      The patient is afebrile. Today is day 3 of IV remdesivir. Blood cultures from admission are negative so far. She is on 60 L oxygen with heated high flow nasal cannula. Serum creatinine 0.5 today. Liver functions are okay so far. Hemoglobin is 12.9. Chest x-ray from yesterday reviewed. Showed multiple bilateral lung infiltrates, classic for COVID-19    Plan:     Diagnostic Workup:    · Continue to monitor LDH, procalcitonin, D-dimer, CRP every other day  · Continue to follow  fever curve, WBC count and blood cultures  · Follow up on liver and renal function    Antimicrobials:    · Will continue IV remdesivir 100 mg every 24 hour  · Continue IV Decadron 20 mg daily  · Maintain good glycemic control while on Decadron and remdesivir  · Continue high flow oxygen support to maintain oxygen saturation above 92%  · Aspiration precautions  · Cough and deep breathing exercises  · Encourage frequent change in posture and poor conditioning as tolerated  · Fall precautions  · The patient has already received COVID-19 convalescent plasma  · Remains quite sick. Remains at risk of requiring intubation  · Critically ill patient. High risk of morbidity and mortality  · Continue close monitoring COVID-19 ICU        Drug Monitoring:    · Continue monitoring for antibiotic toxicity as follows: CBC, CMP  · Continue to watch for following: new or worsening fever, new hypotension, hives, lip swelling and redness or purulence at vascular access sites. I/v access Management:    · Continue to monitor i.v access sites for erythema, induration, discharge or tenderness. · As always, continue efforts to minimize tubes/lines/drains as clinically appropriate to reduce chances of line associated infections.     Risk of Complications/Morbidity/Mortality: High     · Patient is critically ill and has a potentially life threatening infection that poses threat to life/bodily function. · There is potential for worsening infection/ sudden clinically significant or life-threatening deterioration in the patient's condition without appropriate antimicrobial therapy. · Complex medical decision making process was involved to select appropriate antimicrobial agents to reverse the cause of patient's severe infection/ illness. · Antimicrobial therapy requires intensive monitoring for toxicity and frequent dose adjustments to prevent toxicity and permanent end-organ dysfunction. Critical care time: 31 minutes      Thank you for involving me in the care of your patient. I will continue to follow. If you have anyadditional questions, please do not hesitate to contact me. Subjective: Interval history: Interval history was obtained from chart review and RN. she is on IV remdesivir and Decadron. Her oxygen requirement remains quite high. She is on 6 L oxygen via heated high flow nasal cannula and is saturating 90 to 92%. He sometimes tends to desaturate his oxygen level     REVIEW OF SYSTEMS:      Review of Systems   Unable to perform ROS: Acuity of condition         Past Medical History: All past medical history reviewed today. 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 Past Surgical History: All past surgical history was reviewed today. Past Surgical History:   Procedure Laterality Date    BLADDER SURGERY  08/2016    restructured bladder     MOHS SURGERY  01/10/2019    right jawline anterior inferior       Family History: All family history was reviewed today.         Problem Relation Age of Onset    Heart Disease Daughter        Objective: 11/03/20  0434   WBC 3.2* 5.9 7.7   RBC 4.44 4.26 4.23   HGB 13.5 12.8 12.9   HCT 39.9 38.4 38.2    231 253   MCV 89.9 90.1 90.3   MCH 30.3 30.1 30.5   MCHC 33.7 33.4 33.8   RDW 13.4 13.6 13.6        BMP:  Recent Labs     11/01/20  0542 11/02/20  0530 11/03/20  0434    140 141   K 3.3* 3.5 3.7    104 106   CO2 23 24 24   BUN 11 16 22*   CREATININE <0.5* 0.5* <0.5*   CALCIUM 8.3 8.7 8.6   GLUCOSE 133* 147* 141*        Hepatic Function Panel:   Lab Results   Component Value Date    ALKPHOS 70 11/03/2020    ALT 19 11/03/2020    AST 27 11/03/2020    PROT 5.9 11/03/2020    BILITOT 0.4 11/03/2020    LABALBU 2.8 11/03/2020       CPK: No results found for: CKTOTAL  ESR: No results found for: SEDRATE  CRP:   Lab Results   Component Value Date    .1 (H) 11/01/2020           Imaging: All pertinent images and reports for the current visit were reviewed by me during this visit. XR CHEST PORTABLE   Final Result   Multifocal airspace opacities, slightly progressed on the right. XR CHEST PORTABLE   Final Result   No acute interval change. Bilateral airspace disease is similar to prior   exam.         XR CHEST PORTABLE   Final Result   Multifocal ground-glass opacification likely pneumonia including atypical   viral pneumonia. Pulmonary edema is a less likely etiology for the findings. Medications: All current and past medications were reviewed.      dexamethasone  20 mg Intravenous Daily    remdesivir IVPB  100 mg Intravenous Q24H    enoxaparin  30 mg Subcutaneous BID    levothyroxine  112 mcg Oral Daily    venlafaxine  225 mg Oral Daily    aspirin  81 mg Oral Daily    calcium elemental  500 mg Oral Daily    sodium chloride flush  10 mL Intravenous 2 times per day    albuterol sulfate HFA  2 puff Inhalation 4x daily    And    ipratropium  2 puff Inhalation 4x daily           sodium chloride, albuterol sulfate HFA, ALPRAZolam, sodium chloride flush, acetaminophen **OR** acetaminophen, magnesium hydroxide, promethazine **OR** ondansetron, sodium chloride, potassium chloride **OR** potassium alternative oral replacement **OR** potassium chloride, labetalol      Problem list:       Patient Active Problem List   Diagnosis Code    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 Although every effort was made to ensure the accuracy of this automated transcription, some errors in transcription may have occurred inadvertently. If you may need any clarification, please do not hesitate to contact me through EPIC or at the phone number provided below with my electronic signature. Any pictures or media included in this note were obtained after taking informed verbal consent from the patient and with their approval to include those in the patient's medical record.     Juan Luis Landon MD, MPH  11/3/2020 , 4:53 PM   [facility] Infectious Disease   [address] [place] [address], [address] [place] [address]  Office: [phone]  Fax: [phone]  Clinic days:  Tuesday & Thursday

## 2020-11-03 NOTE — PROGRESS NOTES
[facility] Pulmonary and Critical Care    Follow Up Note    Subjective:   CHIEF COMPLAINT / HPI:   Chief Complaint   Patient presents with    Shortness of Breath     Pt brought by [facility] from home with CC of SOB that started Tuesday. Pt reports being dx'ed with COVID on Tuesday reports recent admission at [facility]. pt on RA 88%, 94%on 2L. Interval history: Patient admitted with SARS-CoV-2 pneumonia and acute hypoxemic respiratory failure. She was on the floor yesterday. Has now moved to ICU. She is requiring heated high flow oxygen at 100% and 60 L/min. Saturations are marginal.  She denies shortness of breath but looks dyspneic. Very poor p.o. intake as well. Visit conducted with the assistance of the video in touch    Past Medical History:    Reviewed; no changes    Social History:    Reviewed; no changes    REVIEW OF SYSTEMS:    CONSTITUTIONAL:  negative for fevers and chills  RESPIRATORY:  See HPI  CARDIOVASCULAR:  negative for chest pain, palpitations, edema  GASTROINTESTINAL:  negative for nausea, vomiting, diarrhea, constipation and abdominal pain    Objective:   PHYSICAL EXAM:        VITALS:  BP (!) 145/74   Pulse 65   Temp 98 °F (36.7 °C) (Temporal)   Resp 24   Ht 5' 4\" (1.626 m)   Wt 176 lb 14.4 oz (80.2 kg)   SpO2 (!) 89%   BMI 30.36 kg/m²  on heated high flow 100% and 60 L/min     24HR INTAKE/OUTPUT:      Intake/Output Summary (Last 24 hours) at 11/3/2020 7959  Last data filed at 11/3/2020 0600  Gross per 24 hour   Intake 250 ml   Output 900 ml   Net -650 ml       PHYSICAL EXAM:     In-person bedside physical examination deferred. Pursuant to the emergency declaration under the [facility] authority and the [employer] Act, this clinical encounter was conducted to provide necessary medical care.    (Also consistent with new provisions and guidance offered by [place] on March 18, 2020 in setting of COVID 19 outbreak and in order to preserve personal protective equipment in accordance with the flexibilities announced by CMS on March 30, 2020)   References: https://[facility]. [facility]/Portals/0/Resources/COVID-19/3_18%20Telemed%20Guidance%20Updated%20March%2018. pdf?[email or web address]                      https://[facility]. [facility]/Portals/0/Resources/COVID-19/3_18%20Telemed%20Guidance%20Updated%20March%2018. pdf?[email or web address]                      [email or web address] pdf                            General: Awake and oriented to time place and person according to primary service, vitals reviewed  HEENT: Deferred  Cardiovascular: Telemetry data reviewed, rest deferred   Pulmonary: deferred  Abdomen/GI: deferred  Neuro: deferred  Skin: deferred  Musculoskeletal:  deferred  Genitourinary: Deferred  Psych: deferred  Lymphatic/Immunologic: deferred      DATA:    CBC:  Recent Labs     11/01/20 0542 11/02/20  0530 11/03/20  0434   WBC 3.2* 5.9 7.7   RBC 4.44 4.26 4.23   HGB 13.5 12.8 12.9   HCT 39.9 38.4 38.2    231 253   MCV 89.9 90.1 90.3   MCH 30.3 30.1 30.5   MCHC 33.7 33.4 33.8   RDW 13.4 13.6 13.6      BMP:  Recent Labs     11/01/20  0542 11/02/20  0530 11/03/20  0434    140 141   K 3.3* 3.5 3.7    104 106   CO2 23 24 24   BUN 11 16 22*   CREATININE <0.5* 0.5* <0.5*   CALCIUM 8.3 8.7 8.6   GLUCOSE 133* 147* 141*      ABG:  Recent Labs     11/01/20  0514   PHART 7.433   ROH3XSN 34.8*   PO2ART 68.8*   MYQ0FHB 23.3   J6WIVVAV 94   BEART -1       Cultures:    Abx:    Radiology Review:  Pertinent images / reports were reviewed as a part of this visit. Assessment:     1. Acute hypoxemic respiratory failure  2. SARS-CoV-2 pneumonia  3.  ARDS    I have reviewed laboratories, medical records and images for this visit  Chest imaging reveals severe diffuse airspace disease similar to previous  SARS-CoV-2

## 2020-11-03 NOTE — PLAN OF CARE
Problem: Falls - Risk of:  Goal: Will remain free from falls  Description: Will remain free from falls  11/2/2020 2305 by Yajaira Kwon RN  Outcome: Ongoing     Problem: Falls - Risk of:  Goal: Absence of physical injury  Description: Absence of physical injury  11/2/2020 2305 by Yajaira Kwon RN  Outcome: Ongoing  Patient educated on the importance of calling out before exiting the bed and always waiting for assistance. Bed in lowest position with wheels locked. Alarm activated. 3/4 side rails up. Non-skid socks on. Call light within reach. Hourly checks. All requested needs provided. Problem: Body Temperature -  Risk of, Imbalanced  Goal: Ability to maintain a body temperature within defined limits  Outcome: Ongoing  Body temp WNL. Problem: Pain:  Goal: Pain level will decrease  Description: Pain level will decrease  Outcome: Ongoing     Problem: Pain:  Goal: Control of acute pain  Description: Control of acute pain  Outcome: Ongoing     Problem: Pain:  Goal: Control of chronic pain  Description: Control of chronic pain  Outcome: Ongoing  0/10 pain level. No evidence of distress. Problem: Isolation Precautions - Risk of Spread of Infection  Goal: Prevent transmission of infection  Outcome: Ongoing  Hands washed before entering and exiting room. Proper PPE worn in room. Appropriate caution practiced. Problem: Breathing Pattern - Ineffective  Goal: Ability to achieve and maintain a regular respiratory rate  Outcome: Not Met This Shift  Tachypneic. Problem: Gas Exchange - Impaired  Goal: Absence of hypoxia  Outcome: Ongoing     Problem: Gas Exchange - Impaired  Goal: Promote optimal lung function  Outcome: Ongoing  Maxed on vapotherm.

## 2020-11-03 NOTE — PROGRESS NOTES
Progress Note - Dr. Thalia Cisse - Internal Medicine  PCP: Brenden Ford MD [address] / [place] [id number] [address] Day: 3  Code Status: Full Code  Current Diet: DIET GENERAL;        CC: follow up on medical issues    Subjective:   Nette Mitchell is a 67 y.o. female. She denies problems    Doing about the same  Looks fatigued  Saw pt over video visit  Case d/W dr Dallas Arts    Some concern for lack of progress  Trying to encourage proning    She denies chest pain, complains of shortness of breath, denies nausea,  denies emesis. 10 system Review of Systems is reviewed with patient, and pertinent positives are noted in HPI above . Otherwise, Review of systems is negative. I have reviewed the patient's medical and social history in detail and updated the computerized patient record. To recap: She  has a past medical history of Back pain, Cancer ([facility] [address] Essential hypertension, Headache, Hypothyroidism, Migraines, Thyroid disease, and UTI (lower urinary tract infection). . She  has a past surgical history that includes Bladder surgery (08/2016) and Mohs surgery (01/10/2019). . She  reports that she has quit smoking. She has never used smokeless tobacco. She reports current alcohol use. [facility] Problems    Diagnosis Date Noted    Acute respiratory failure due to COVID-19 ([facility] [address] [U07.1, J96.00] 11/02/2020    Fever [R50.9]     Elevated d-dimer [R79.89]     Elevated C-reactive protein (CRP) [R79.82]     Essential hypertension [I10]     Ex-smoker [Z87.891]     Overweight [E66.3]     COVID-19 [U07.1] 10/31/2020    COVID-19 virus infection [U07.1] 10/31/2020    Depression [F32.9] 10/31/2020    Hypothyroid [E03.9] 10/31/2020       Current Facility-Administered Medications: dexamethasone (DECADRON) 20 mg in sodium chloride 0.9 % IVPB, 20 mg, Intravenous, Daily  [COMPLETED] remdesivir 200 mg in sodium chloride 0.9 % 250 mL IVPB, 200 mg, Intravenous, Once **FOLLOWED BY** remdesivir 100 mg in sodium chloride 0.9 % 250 mL IVPB, 100 mg, Intravenous, Q24H  0.9 % sodium chloride bolus, 30 mL, Intravenous, PRN  enoxaparin (LOVENOX) injection 30 mg, 30 mg, Subcutaneous, BID  albuterol sulfate  (90 Base) MCG/ACT inhaler 2 puff, 2 puff, Inhalation, Q6H PRN  ALPRAZolam (XANAX) tablet 0.5 mg, 0.5 mg, Oral, Nightly PRN  levothyroxine (SYNTHROID) tablet 112 mcg, 112 mcg, Oral, Daily  venlafaxine (EFFEXOR XR) extended release capsule 225 mg, 225 mg, Oral, Daily  aspirin EC tablet 81 mg, 81 mg, Oral, Daily  calcium elemental (OSCAL) tablet 500 mg, 500 mg, Oral, Daily  sodium chloride flush 0.9 % injection 10 mL, 10 mL, Intravenous, 2 times per day  sodium chloride flush 0.9 % injection 10 mL, 10 mL, Intravenous, PRN  acetaminophen (TYLENOL) tablet 650 mg, 650 mg, Oral, Q6H PRN **OR** acetaminophen (TYLENOL) suppository 650 mg, 650 mg, Rectal, Q6H PRN  magnesium hydroxide (MILK OF MAGNESIA) 400 MG/5ML suspension 30 mL, 30 mL, Oral, Daily PRN  promethazine (PHENERGAN) tablet 12.5 mg, 12.5 mg, Oral, Q6H PRN **OR** ondansetron (ZOFRAN) injection 4 mg, 4 mg, Intravenous, Q6H PRN  0.9 % sodium chloride bolus, 500 mL, Intravenous, PRN  potassium chloride (KLOR-CON M) extended release tablet 40 mEq, 40 mEq, Oral, PRN **OR** potassium bicarb-citric acid (EFFER-K) effervescent tablet 40 mEq, 40 mEq, Oral, PRN **OR** potassium chloride 10 mEq/100 mL IVPB (Peripheral Line), 10 mEq, Intravenous, PRN  labetalol (NORMODYNE;TRANDATE) injection 10 mg, 10 mg, Intravenous, Q6H PRN  albuterol sulfate  (90 Base) MCG/ACT inhaler 2 puff, 2 puff, Inhalation, 4x daily **AND** ipratropium (ATROVENT HFA) 17 MCG/ACT inhaler 2 puff, 2 puff, Inhalation, 4x daily **AND** MDI Treatment, , , 4x daily         Objective:  BP (!) 145/74   Pulse 65   Temp 98 °F (36.7 °C) (Temporal)   Resp 24   Ht 5' 4\" (1.626 m)   Wt 176 lb 14.4 oz (80.2 kg)   SpO2 (!) 89%   BMI 30.36 kg/m²      Patient Vitals for the past 24 hrs:   BP Temp Temp src Pulse Resp SpO2 Weight   11/03/20 0752 -- -- -- -- 24 (!) 89 % --   11/03/20 0700 -- -- -- -- 22 -- --   11/03/20 0600 (!) 145/74 -- -- 65 24 90 % --   11/03/20 0500 (!) 153/68 -- -- 64 25 90 % --   11/03/20 0400 (!) 146/72 98 °F (36.7 °C) Temporal 66 21 90 % 176 lb 14.4 oz (80.2 kg)   11/03/20 0300 (!) 142/78 -- -- 61 23 90 % --   11/03/20 0200 (!) 140/70 -- -- 60 27 (!) 89 % --   11/03/20 0100 138/69 -- -- 65 24 93 % --   11/03/20 0018 -- 97.5 °F (36.4 °C) Temporal -- -- -- --   11/03/20 0000 (!) 143/68 -- -- 62 24 90 % --   11/02/20 2300 137/66 -- -- 65 25 92 % --   11/02/20 2200 134/68 -- -- 62 21 93 % --   11/02/20 2100 120/64 -- -- 76 24 91 % --   11/02/20 2015 -- -- -- 72 -- 90 % --   11/02/20 2012 126/68 97.9 °F (36.6 °C) Temporal 70 23 (!) 88 % --   11/02/20 1934 -- -- -- -- -- 92 % --   11/02/20 1723 139/72 98.4 °F (36.9 °C) Temporal 67 22 -- --   11/02/20 1237 -- -- -- -- -- 93 % --   11/02/20 1208 136/75 98.4 °F (36.9 °C) Oral 71 22 91 % --   11/02/20 0916 (!) 153/79 98.2 °F (36.8 °C) Oral 68 24 92 % --   11/02/20 0840 -- -- -- -- 24 93 % --     Patient Vitals for the past 96 hrs (Last 3 readings):   Weight   11/03/20 0400 176 lb 14.4 oz (80.2 kg)   11/01/20 0000 173 lb 11.2 oz (78.8 kg)   10/31/20 1443 170 lb (77.1 kg)           Intake/Output Summary (Last 24 hours) at 11/3/2020 0835  Last data filed at 11/3/2020 0600  Gross per 24 hour   Intake 250 ml   Output 900 ml   Net -650 ml         Physical Exam:   In-person bedside physical examination deferred.    ( based on new provisions and guidance offered by [facility] on March 18, 2020 in setting of COVID 19 outbreak and in order to preserve personal protective equipment in accordance with the flexibilities announced by CMS on March 30, 2020)      Labs:  Lab Results   Component Value Date    WBC 7.7 11/03/2020    HGB 12.9 11/03/2020    HCT 38.2 11/03/2020     11/03/2020    ALT 19 11/03/2020    AST 27 11/03/2020  11/03/2020    K 3.7 11/03/2020     11/03/2020    CREATININE <0.5 (L) 11/03/2020    BUN 22 (H) 11/03/2020    CO2 24 11/03/2020    INR 1.10 10/31/2020    LABMICR YES 10/31/2020     Lab Results   Component Value Date    TROPONINI <0.01 11/01/2020       Recent Imaging Results are Reviewed:  Xr Chest Portable    Result Date: 11/2/2020  EXAMINATION: ONE XRAY VIEW OF THE CHEST 11/2/2020 2:51 pm COMPARISON: November 1, 2020 HISTORY: ORDERING SYSTEM PROVIDED HISTORY: ASSESS FOR INFILTRATES TECHNOLOGIST PROVIDED HISTORY: Reason for exam:->ASSESS FOR INFILTRATES Reason for Exam: ASSESS FOR INFILTRATES Acuity: Acute Type of Exam: Initial FINDINGS: Cardiac silhouette is enlarged. No pneumothorax. Small left pleural effusion, unchanged. Multifocal airspace opacities which appears slightly progressed on the right. No acute bony abnormality. Multifocal airspace opacities, slightly progressed on the right. Xr Chest Portable    Result Date: 11/1/2020  EXAMINATION: ONE XRAY VIEW OF THE CHEST 11/1/2020 5:06 am COMPARISON: Chest radiograph 10/31/2020 HISTORY: ORDERING SYSTEM PROVIDED HISTORY: Increase O2 demands TECHNOLOGIST PROVIDED HISTORY: Reason for exam:->Increase O2 demands Reason for Exam: Increase O2 demands Acuity: Unknown Type of Exam: Unknown FINDINGS: No substantial change in peripheral, mid and lower lung predominant airspace disease. No pneumothorax or pleural effusion. No new focal airspace disease. Stable cardiomediastinal contours, partially obscured. Intact skeleton. No acute interval change. Bilateral airspace disease is similar to prior exam.     Xr Chest Portable    Result Date: 10/31/2020  EXAMINATION: ONE XRAY VIEW OF THE CHEST 10/31/2020 3:29 pm COMPARISON: None.  HISTORY: ORDERING SYSTEM PROVIDED HISTORY: SOB TECHNOLOGIST PROVIDED HISTORY: Reason for exam:->SOB Reason for Exam: COVID+, low O2 sats Acuity: Acute Type of Exam: Initial FINDINGS: The cardiac silhouette is mildly prominent. Multifocal ground-glass opacification, particularly in the perihilar regions and left lung base. There is no pleural fluid. There is sparing of the lung apices. Multifocal ground-glass opacification likely pneumonia including atypical viral pneumonia. Pulmonary edema is a less likely etiology for the findings. Assessment and Plan:  Principal Problem:    Acute respiratory failure due to COVID-19 [facility]) -Established problem. Uncontrolled. Remains on high flow o2. Plan: cont o2, cont remdesivir. Try to prone. If does not improve, may need intubtaion  Active Problems:    COVID-19 -Established problem. Stable. Plan: as above    Depression -Established problem. Stable. Plan: Continue present orders/plan. Hypothyroid -Established problem. Stable. Plan: cont synthroid    Essential hypertension -Established problem. Stable.   145/74  Plan: stay on same meds    Total critical care time caring for this patient with life threatening illness, including direct patient contact, management of life support systems, review of data including imaging and labs, discussions with other team members and physicians at least 36 minutes today                Lovina Mask  11/3/2020

## 2020-11-03 NOTE — PROGRESS NOTES
Pt to 5901 at this time. Placed on ICU monitoring. Pt sats 93% maxed out on vapotherm. Pt is tolerating at this time, although complains of nose hurting. Will ask MD for ocean spray. VSS, no other significant findings at this time.

## 2020-11-03 NOTE — PROGRESS NOTES
0000 assessment complete. Patient is diaphoretic and states that she is very hot. Body temperature 97.5 degrees. Thermostat adjusted. [place] removed. Draw sheet changed. Ice and diet sprite per request. No other acute changes noted.

## 2020-11-03 NOTE — PROGRESS NOTES
0400 assessment complete. Patient has now become cold from turning down her thermostat. Body temp 98 degrees. Warm blankets provided. No other acute changes noted.

## 2020-11-04 LAB
A/G RATIO: 0.9 (ref 1.1–2.2)
ALBUMIN SERPL-MCNC: 2.7 G/DL (ref 3.4–5)
ALP BLD-CCNC: 70 U/L (ref 40–129)
ALT SERPL-CCNC: 49 U/L (ref 10–40)
ANION GAP SERPL CALCULATED.3IONS-SCNC: 10 MMOL/L (ref 3–16)
AST SERPL-CCNC: 37 U/L (ref 15–37)
BASOPHILS ABSOLUTE: 0 K/UL (ref 0–0.2)
BASOPHILS RELATIVE PERCENT: 0.1 %
BILIRUB SERPL-MCNC: 0.5 MG/DL (ref 0–1)
BLOOD CULTURE, ROUTINE: NORMAL
BUN BLDV-MCNC: 19 MG/DL (ref 7–20)
CALCIUM SERPL-MCNC: 8.5 MG/DL (ref 8.3–10.6)
CHLORIDE BLD-SCNC: 104 MMOL/L (ref 99–110)
CO2: 23 MMOL/L (ref 21–32)
CREAT SERPL-MCNC: <0.5 MG/DL (ref 0.6–1.2)
CULTURE, BLOOD 2: NORMAL
EOSINOPHILS ABSOLUTE: 0 K/UL (ref 0–0.6)
EOSINOPHILS RELATIVE PERCENT: 0 %
GFR AFRICAN AMERICAN: >60
GFR NON-AFRICAN AMERICAN: >60
GLOBULIN: 3.1 G/DL
GLUCOSE BLD-MCNC: 128 MG/DL (ref 70–99)
HCT VFR BLD CALC: 39.5 % (ref 36–48)
HEMOGLOBIN: 13 G/DL (ref 12–16)
LYMPHOCYTES ABSOLUTE: 0.7 K/UL (ref 1–5.1)
LYMPHOCYTES RELATIVE PERCENT: 8.7 %
MCH RBC QN AUTO: 29.8 PG (ref 26–34)
MCHC RBC AUTO-ENTMCNC: 32.9 G/DL (ref 31–36)
MCV RBC AUTO: 90.6 FL (ref 80–100)
MONOCYTES ABSOLUTE: 0.5 K/UL (ref 0–1.3)
MONOCYTES RELATIVE PERCENT: 6.5 %
NEUTROPHILS ABSOLUTE: 6.7 K/UL (ref 1.7–7.7)
NEUTROPHILS RELATIVE PERCENT: 84.7 %
PDW BLD-RTO: 13.9 % (ref 12.4–15.4)
PLATELET # BLD: 261 K/UL (ref 135–450)
PMV BLD AUTO: 8 FL (ref 5–10.5)
POTASSIUM SERPL-SCNC: 3.8 MMOL/L (ref 3.5–5.1)
RBC # BLD: 4.36 M/UL (ref 4–5.2)
SODIUM BLD-SCNC: 137 MMOL/L (ref 136–145)
TOTAL PROTEIN: 5.8 G/DL (ref 6.4–8.2)
WBC # BLD: 8 K/UL (ref 4–11)

## 2020-11-04 PROCEDURE — 80053 COMPREHEN METABOLIC PANEL: CPT

## 2020-11-04 PROCEDURE — 6360000002 HC RX W HCPCS: Performed by: INTERNAL MEDICINE

## 2020-11-04 PROCEDURE — 2580000003 HC RX 258: Performed by: INTERNAL MEDICINE

## 2020-11-04 PROCEDURE — 2700000000 HC OXYGEN THERAPY PER DAY

## 2020-11-04 PROCEDURE — 6370000000 HC RX 637 (ALT 250 FOR IP): Performed by: INTERNAL MEDICINE

## 2020-11-04 PROCEDURE — 2500000003 HC RX 250 WO HCPCS: Performed by: INTERNAL MEDICINE

## 2020-11-04 PROCEDURE — 36415 COLL VENOUS BLD VENIPUNCTURE: CPT

## 2020-11-04 PROCEDURE — 2000000000 HC ICU R&B

## 2020-11-04 PROCEDURE — 94761 N-INVAS EAR/PLS OXIMETRY MLT: CPT

## 2020-11-04 PROCEDURE — 99233 SBSQ HOSP IP/OBS HIGH 50: CPT | Performed by: INTERNAL MEDICINE

## 2020-11-04 PROCEDURE — 94640 AIRWAY INHALATION TREATMENT: CPT

## 2020-11-04 PROCEDURE — 85025 COMPLETE CBC W/AUTO DIFF WBC: CPT

## 2020-11-04 RX ORDER — SODIUM CHLORIDE 9 MG/ML
INJECTION, SOLUTION INTRAVENOUS
Status: DISPENSED
Start: 2020-11-04 | End: 2020-11-05

## 2020-11-04 RX ORDER — CALCIUM CARBONATE 200(500)MG
500 TABLET,CHEWABLE ORAL 3 TIMES DAILY PRN
Status: DISCONTINUED | OUTPATIENT
Start: 2020-11-04 | End: 2020-11-13 | Stop reason: HOSPADM

## 2020-11-04 RX ADMIN — ALBUTEROL SULFATE 2 PUFF: 90 AEROSOL, METERED RESPIRATORY (INHALATION) at 15:49

## 2020-11-04 RX ADMIN — ALBUTEROL SULFATE 2 PUFF: 90 AEROSOL, METERED RESPIRATORY (INHALATION) at 19:19

## 2020-11-04 RX ADMIN — ASPIRIN 81 MG: 81 TABLET, COATED ORAL at 08:21

## 2020-11-04 RX ADMIN — REMDESIVIR 100 MG: 100 INJECTION, POWDER, LYOPHILIZED, FOR SOLUTION INTRAVENOUS at 11:22

## 2020-11-04 RX ADMIN — Medication 500 MG: at 08:21

## 2020-11-04 RX ADMIN — ENOXAPARIN SODIUM 30 MG: 30 INJECTION SUBCUTANEOUS at 08:21

## 2020-11-04 RX ADMIN — ALBUTEROL SULFATE 2 PUFF: 90 AEROSOL, METERED RESPIRATORY (INHALATION) at 07:31

## 2020-11-04 RX ADMIN — DEXAMETHASONE SODIUM PHOSPHATE 20 MG: 4 INJECTION, SOLUTION INTRA-ARTICULAR; INTRALESIONAL; INTRAMUSCULAR; INTRAVENOUS; SOFT TISSUE at 09:40

## 2020-11-04 RX ADMIN — ANTACID TABLETS 500 MG: 500 TABLET, CHEWABLE ORAL at 23:12

## 2020-11-04 RX ADMIN — ENOXAPARIN SODIUM 30 MG: 30 INJECTION SUBCUTANEOUS at 20:24

## 2020-11-04 RX ADMIN — IPRATROPIUM BROMIDE 2 PUFF: 17 AEROSOL, METERED RESPIRATORY (INHALATION) at 10:40

## 2020-11-04 RX ADMIN — LEVOTHYROXINE SODIUM 112 MCG: 0.11 TABLET ORAL at 08:21

## 2020-11-04 RX ADMIN — VENLAFAXINE HYDROCHLORIDE 225 MG: 150 CAPSULE, EXTENDED RELEASE ORAL at 08:21

## 2020-11-04 RX ADMIN — IPRATROPIUM BROMIDE 2 PUFF: 17 AEROSOL, METERED RESPIRATORY (INHALATION) at 07:31

## 2020-11-04 RX ADMIN — Medication 10 ML: at 20:24

## 2020-11-04 RX ADMIN — IPRATROPIUM BROMIDE 2 PUFF: 17 AEROSOL, METERED RESPIRATORY (INHALATION) at 15:49

## 2020-11-04 RX ADMIN — IPRATROPIUM BROMIDE 2 PUFF: 17 AEROSOL, METERED RESPIRATORY (INHALATION) at 19:19

## 2020-11-04 RX ADMIN — ALBUTEROL SULFATE 2 PUFF: 90 AEROSOL, METERED RESPIRATORY (INHALATION) at 10:40

## 2020-11-04 ASSESSMENT — PAIN SCALES - GENERAL
PAINLEVEL_OUTOF10: 0

## 2020-11-04 ASSESSMENT — PAIN SCALES - WONG BAKER
WONGBAKER_NUMERICALRESPONSE: 0

## 2020-11-04 NOTE — PROGRESS NOTES
Pt resting in bed. AAO and cooperative, mild confusion upon awakening d/t ativan given before bed. VSS, patient is afebrile. Appears comfortable in no immediate distress. Becomes dyspnic with cares/stimulation and talking. Occasionally removes nasal canula from nostrils and saturations drop to 81-84% on room air. Assessment charted. No complaints. Chapstick applied for dry lips. Call light is in reach. Instructed to use for assistance. Denies needs at this time. Will monitor.

## 2020-11-04 NOTE — PROGRESS NOTES
[facility]    Follow Up Note    Subjective:   CHIEF COMPLAINT / HPI:   Chief Complaint   Patient presents with    Shortness of Breath     Pt brought by [facility] from home with CC of SOB that started Tuesday. Pt reports being dx'ed with COVID on Tuesday reports recent admission at [facility]. pt on RA 88%, 94%on 2L. Interval history: Patient remains alert. She is feeling little better than she did yesterday. However, continues to require heated high flow oxygen at 100% and 60 L/min to maintain saturations in the low 90s. She admits her p.o. intake is fairly poor as well. Past Medical History:    Reviewed; no changes    Social History:    Reviewed; no changes    REVIEW OF SYSTEMS:    CONSTITUTIONAL:  negative for fevers and chills  RESPIRATORY:  See HPI  CARDIOVASCULAR:  negative for chest pain, palpitations, edema  GASTROINTESTINAL:  negative for nausea, vomiting, diarrhea, constipation and abdominal pain    Objective:   PHYSICAL EXAM:        VITALS:  /65   Pulse 60   Temp 96.8 °F (36 °C) (Temporal)   Resp 16   Ht 5' 4\" (1.626 m)   Wt 179 lb 14.4 oz (81.6 kg)   SpO2 93%   BMI 30.88 kg/m²  on heated high flow 100% and 60 L/min     24HR INTAKE/OUTPUT:      Intake/Output Summary (Last 24 hours) at 11/4/2020 0937  Last data filed at 11/4/2020 0500  Gross per 24 hour   Intake 614 ml   Output 1150 ml   Net -536 ml       PHYSICAL EXAM:     In-person bedside physical examination deferred. Pursuant to the emergency declaration under the [facility], [address] [place] [address] authority and the [employer] General Act, this clinical encounter was conducted to provide necessary medical care.    (Also consistent with new provisions and guidance offered by [facility] on March 18, 2020 in setting of COVID 19 outbreak and in order to preserve personal protective equipment in accordance with the flexibilities announced by CMS on March 30, 2020)   References: [email or web address] [facility]/Portals/0/Resources/COVID-19/3_18%20Telemed%20Guidance%20Updated%20March%2018. pdf?[email or web address]                      [email or web address] [facility]/Portals/0/Resources/COVID-19/3_18%20Telemed%20Guidance%20Updated%20March%2018. pdf?[email or web address]                      [email or web address] pdf                            General: Awake and oriented to time place and person according to primary service, vitals reviewed  HEENT: Deferred  Cardiovascular: Telemetry data reviewed, rest deferred   Pulmonary: deferred  Abdomen/GI: deferred  Neuro: deferred  Skin: deferred  Musculoskeletal:  deferred  Genitourinary: Deferred  Psych: deferred  Lymphatic/Immunologic: deferred      DATA:    CBC:  Recent Labs     11/02/20 0530 11/03/20 0434 11/04/20 0431   WBC 5.9 7.7 8.0   RBC 4.26 4.23 4.36   HGB 12.8 12.9 13.0   HCT 38.4 38.2 39.5    253 261   MCV 90.1 90.3 90.6   MCH 30.1 30.5 29.8   MCHC 33.4 33.8 32.9   RDW 13.6 13.6 13.9      BMP:  Recent Labs     11/02/20 0530 11/03/20 0434 11/04/20 0431    141 137   K 3.5 3.7 3.8    106 104   CO2 24 24 23   BUN 16 22* 19   CREATININE 0.5* <0.5* <0.5*   CALCIUM 8.7 8.6 8.5   GLUCOSE 147* 141* 128*      ABG:  No results for input(s): PHART, LCR6ABF, PO2ART, ZEB1SXJ, L9WTNNJY, BEART in the last 72 hours. Cultures:    Abx:    Radiology Review:  Pertinent images / reports were reviewed as a part of this visit. Assessment:     1. Acute hypoxemic respiratory failure  2. SARS-CoV-2 pneumonia  3. ARDS    I have reviewed laboratories, medical records and images for this visit  Chest imaging reveals severe diffuse airspace disease similar to previous  SARS-CoV-2 positive  Now requiring heated high flow oxygen 100% FiO2 and 60 L/min flow. ID following. The patient has received convalescent plasma.   On day #4 of remdesivir. Also receiving Decadron 20 mg daily. Oxygen saturations are holding in the low 90s.   Continue supportive care

## 2020-11-04 NOTE — PROGRESS NOTES
Progress Note - Dr. David Dial - Internal Medicine  PCP: Karen Nunez MD [address] / [place] [id number] [address] Day: 4  Code Status: Full Code  Current Diet: DIET GENERAL;        CC: follow up on medical issues    Subjective:   Betty Malik is a 67 y.o. female. She denies problems    Doing about the same  Remains on vapotherm   Appreciate pulm/ID eval    Remains on remdesivir    She denies chest pain, complains of shortness of breath, denies nausea,  denies emesis. 10 system Review of Systems is reviewed with patient, and pertinent positives are noted in HPI above . Otherwise, Review of systems is negative. I have reviewed the patient's medical and social history in detail and updated the computerized patient record. To recap: She  has a past medical history of Back pain, Cancer ([facility] [address] Essential hypertension, Headache, Hypothyroidism, Migraines, Thyroid disease, and UTI (lower urinary tract infection). . She  has a past surgical history that includes Bladder surgery (08/2016) and Mohs surgery (01/10/2019). . She  reports that she has quit smoking. She has never used smokeless tobacco. She reports current alcohol use. [facility]         Active Hospital Problems    Diagnosis Date Noted    Acute respiratory failure due to COVID-19 ([facility] [address] [U07.1, J96.00] 11/02/2020    Fever [R50.9]     Elevated d-dimer [R79.89]     Elevated C-reactive protein (CRP) [R79.82]     Essential hypertension [I10]     Ex-smoker [Z87.891]     Overweight [E66.3]     COVID-19 [U07.1] 10/31/2020    COVID-19 virus infection [U07.1] 10/31/2020    Depression [F32.9] 10/31/2020    Hypothyroid [E03.9] 10/31/2020       Current Facility-Administered Medications: lip balm petroleum free (PHYTOPLEX) stick, , Topical, PRN  dexamethasone (DECADRON) 20 mg in sodium chloride 0.9 % IVPB, 20 mg, Intravenous, Daily  [COMPLETED] remdesivir 200 mg in sodium chloride 0.9 % 250 mL IVPB, 200 mg, Intravenous, Once **FOLLOWED BY** remdesivir 100 mg in sodium chloride 0.9 % 250 mL IVPB, 100 mg, Intravenous, Q24H  0.9 % sodium chloride bolus, 30 mL, Intravenous, PRN  enoxaparin (LOVENOX) injection 30 mg, 30 mg, Subcutaneous, BID  albuterol sulfate  (90 Base) MCG/ACT inhaler 2 puff, 2 puff, Inhalation, Q6H PRN  ALPRAZolam (XANAX) tablet 0.5 mg, 0.5 mg, Oral, Nightly PRN  levothyroxine (SYNTHROID) tablet 112 mcg, 112 mcg, Oral, Daily  venlafaxine (EFFEXOR XR) extended release capsule 225 mg, 225 mg, Oral, Daily  aspirin EC tablet 81 mg, 81 mg, Oral, Daily  calcium elemental (OSCAL) tablet 500 mg, 500 mg, Oral, Daily  sodium chloride flush 0.9 % injection 10 mL, 10 mL, Intravenous, 2 times per day  sodium chloride flush 0.9 % injection 10 mL, 10 mL, Intravenous, PRN  acetaminophen (TYLENOL) tablet 650 mg, 650 mg, Oral, Q6H PRN **OR** acetaminophen (TYLENOL) suppository 650 mg, 650 mg, Rectal, Q6H PRN  magnesium hydroxide (MILK OF MAGNESIA) 400 MG/5ML suspension 30 mL, 30 mL, Oral, Daily PRN  promethazine (PHENERGAN) tablet 12.5 mg, 12.5 mg, Oral, Q6H PRN **OR** ondansetron (ZOFRAN) injection 4 mg, 4 mg, Intravenous, Q6H PRN  0.9 % sodium chloride bolus, 500 mL, Intravenous, PRN  potassium chloride (KLOR-CON M) extended release tablet 40 mEq, 40 mEq, Oral, PRN **OR** potassium bicarb-citric acid (EFFER-K) effervescent tablet 40 mEq, 40 mEq, Oral, PRN **OR** potassium chloride 10 mEq/100 mL IVPB (Peripheral Line), 10 mEq, Intravenous, PRN  labetalol (NORMODYNE;TRANDATE) injection 10 mg, 10 mg, Intravenous, Q6H PRN  albuterol sulfate  (90 Base) MCG/ACT inhaler 2 puff, 2 puff, Inhalation, 4x daily **AND** ipratropium (ATROVENT HFA) 17 MCG/ACT inhaler 2 puff, 2 puff, Inhalation, 4x daily **AND** MDI Treatment, , , 4x daily         Objective:  BP (!) 145/70   Pulse 60   Temp 96.8 °F (36 °C) (Temporal)   Resp 22   Ht 5' 4\" (1.626 m)   Wt 179 lb 14.4 oz (81.6 kg)   SpO2 93%   BMI 30.88 kg/m²      Patient Vitals for the past 24 hrs: BP Temp Temp src Pulse Resp SpO2 Weight   11/04/20 0731 -- -- -- -- 22 93 % --   11/04/20 0600 (!) 145/70 -- -- 60 -- 93 % --   11/04/20 0500 (!) 144/70 -- -- 59 -- 93 % --   11/04/20 0400 137/69 96.8 °F (36 °C) Temporal 60 25 (!) 86 % 179 lb 14.4 oz (81.6 kg)   11/04/20 0316 -- -- -- -- 20 90 % --   11/04/20 0300 (!) 145/79 -- -- 62 -- 91 % --   11/04/20 0200 (!) 153/77 -- -- 59 -- 92 % --   11/04/20 0100 (!) 149/76 -- -- 59 -- 94 % --   11/04/20 0000 (!) 155/71 98.6 °F (37 °C) Temporal 58 20 90 % --   11/03/20 2348 -- -- -- -- 20 90 % --   11/03/20 2300 (!) 143/75 -- -- 66 -- 90 % --   11/03/20 2200 (!) 143/73 -- -- 63 -- 91 % --   11/03/20 2100 138/73 -- -- 63 -- 90 % --   11/03/20 2000 134/70 98.2 °F (36.8 °C) Temporal 66 24 (!) 88 % --   11/03/20 1928 -- -- -- -- 22 92 % --   11/03/20 1800 (!) 143/79 -- -- 67 16 (!) 89 % --   11/03/20 1700 (!) 141/78 -- -- 67 20 97 % --   11/03/20 1600 (!) 140/72 97.2 °F (36.2 °C) Temporal 64 25 93 % --   11/03/20 1500 136/86 -- -- 74 29 92 % --   11/03/20 1455 -- -- -- -- 16 93 % --   11/03/20 1400 (!) 140/67 -- -- 65 22 96 % --   11/03/20 1300 (!) 141/75 -- -- 69 29 92 % --   11/03/20 1218 -- -- -- -- 18 92 % --   11/03/20 1200 (!) 146/76 97.9 °F (36.6 °C) Temporal 64 22 90 % --   11/03/20 1100 131/63 -- -- 64 20 94 % --   11/03/20 1000 136/71 -- -- 66 22 92 % --   11/03/20 0900 120/64 -- -- 74 20 91 % --     Patient Vitals for the past 96 hrs (Last 3 readings):   Weight   11/04/20 0400 179 lb 14.4 oz (81.6 kg)   11/03/20 0400 176 lb 14.4 oz (80.2 kg)   11/01/20 0000 173 lb 11.2 oz (78.8 kg)           Intake/Output Summary (Last 24 hours) at 11/4/2020 0812  Last data filed at 11/4/2020 0500  Gross per 24 hour   Intake 734 ml   Output 1150 ml   Net -416 ml         Physical Exam:   BP (!) 145/70   Pulse 60   Temp 96.8 °F (36 °C) (Temporal)   Resp 22   Ht 5' 4\" (1.626 m)   Wt 179 lb 14.4 oz (81.6 kg)   SpO2 93%   BMI 30.88 kg/m²     In-person bedside physical examination deferred. ( based on new provisions and guidance offered by [facility] on March 18, 2020 in setting of COVID 19 outbreak and in order to preserve personal protective equipment in accordance with the flexibilities announced by CMS on March 30, 2020)      Labs:  Lab Results   Component Value Date    WBC 8.0 11/04/2020    HGB 13.0 11/04/2020    HCT 39.5 11/04/2020     11/04/2020    ALT 49 (H) 11/04/2020    AST 37 11/04/2020     11/04/2020    K 3.8 11/04/2020     11/04/2020    CREATININE <0.5 (L) 11/04/2020    BUN 19 11/04/2020    CO2 23 11/04/2020    INR 1.10 10/31/2020    LABMICR YES 10/31/2020     Lab Results   Component Value Date    TROPONINI <0.01 11/01/2020       Recent Imaging Results are Reviewed:  Xr Chest Portable    Result Date: 11/2/2020  EXAMINATION: ONE XRAY VIEW OF THE CHEST 11/2/2020 2:51 pm COMPARISON: November 1, 2020 HISTORY: ORDERING SYSTEM PROVIDED HISTORY: ASSESS FOR INFILTRATES TECHNOLOGIST PROVIDED HISTORY: Reason for exam:->ASSESS FOR INFILTRATES Reason for Exam: ASSESS FOR INFILTRATES Acuity: Acute Type of Exam: Initial FINDINGS: Cardiac silhouette is enlarged. No pneumothorax. Small left pleural effusion, unchanged. Multifocal airspace opacities which appears slightly progressed on the right. No acute bony abnormality. Multifocal airspace opacities, slightly progressed on the right. Xr Chest Portable    Result Date: 11/1/2020  EXAMINATION: ONE XRAY VIEW OF THE CHEST 11/1/2020 5:06 am COMPARISON: Chest radiograph 10/31/2020 HISTORY: ORDERING SYSTEM PROVIDED HISTORY: Increase O2 demands TECHNOLOGIST PROVIDED HISTORY: Reason for exam:->Increase O2 demands Reason for Exam: Increase O2 demands Acuity: Unknown Type of Exam: Unknown FINDINGS: No substantial change in peripheral, mid and lower lung predominant airspace disease. No pneumothorax or pleural effusion. No new focal airspace disease. Stable cardiomediastinal contours, partially obscured.

## 2020-11-04 NOTE — PLAN OF CARE
Problem: Falls - Risk of:  Goal: Will remain free from falls  Description: Will remain free from falls  11/3/2020 2037 by Panchito Mederos RN  Outcome: Ongoing  11/3/2020 1629 by Kailee Méndez RN  Outcome: Ongoing  Goal: Absence of physical injury  Description: Absence of physical injury  11/3/2020 2037 by Panchito Mederos RN  Outcome: Ongoing  11/3/2020 1629 by Kailee Méndez RN  Outcome: Ongoing     Problem: Airway Clearance - Ineffective  Goal: Achieve or maintain patent airway  11/3/2020 2037 by Panchito Mederos RN  Outcome: Ongoing  11/3/2020 1629 by Kailee Méndez RN  Outcome: Ongoing     Problem: Gas Exchange - Impaired  Goal: Absence of hypoxia  11/3/2020 2037 by Panchito Mederos RN  Outcome: Ongoing  11/3/2020 1629 by Kailee Méndez RN  Outcome: Ongoing  Goal: Promote optimal lung function  11/3/2020 2037 by Panchito Mederos RN  Outcome: Ongoing  11/3/2020 1629 by Kailee Méndez RN  Outcome: Ongoing     Problem: Breathing Pattern - Ineffective  Goal: Ability to achieve and maintain a regular respiratory rate  11/3/2020 2037 by Panchito Mederos RN  Outcome: Ongoing  11/3/2020 1629 by Kailee Méndez RN  Outcome: Ongoing     Problem:  Body Temperature -  Risk of, Imbalanced  Goal: Ability to maintain a body temperature within defined limits  11/3/2020 2037 by Panchito Mederos RN  Outcome: Ongoing  11/3/2020 1629 by Kailee Méndez RN  Outcome: Ongoing  Goal: Will regain or maintain usual level of consciousness  11/3/2020 2037 by Panchito Mederos RN  Outcome: Ongoing  11/3/2020 1629 by Kailee Méndez RN  Outcome: Ongoing  Goal: Complications related to the disease process, condition or treatment will be avoided or minimized  11/3/2020 2037 by Panhcito Mederos RN  Outcome: Ongoing  11/3/2020 1629 by Kailee Méndez RN  Outcome: Ongoing     Problem: Isolation Precautions - Risk of Spread of Infection  Goal: Prevent transmission of infection  11/3/2020 2037 by Benjamín Mackey NESTOR Bales  Outcome: Ongoing  11/3/2020 1629 by Armando Waterman RN  Outcome: Ongoing     Problem: Nutrition Deficits  Goal: Optimize nutrtional status  11/3/2020 2037 by Jakub Riley RN  Outcome: Ongoing  11/3/2020 1629 by Armando Waterman RN  Outcome: Ongoing     Problem: Risk for Fluid Volume Deficit  Goal: Maintain normal heart rhythm  11/3/2020 2037 by Jakub Riley RN  Outcome: Ongoing  11/3/2020 1629 by Armando Waterman RN  Outcome: Ongoing  Goal: Maintain absence of muscle cramping  11/3/2020 2037 by Jakub Riley RN  Outcome: Ongoing  11/3/2020 1629 by Armando Waterman RN  Outcome: Ongoing  Goal: Maintain normal serum potassium, sodium, calcium, phosphorus, and pH  11/3/2020 2037 by Jakub Riley RN  Outcome: Ongoing  11/3/2020 1629 by Armando Waterman RN  Outcome: Ongoing     Problem: Loneliness or Risk for Loneliness  Goal: Demonstrate positive use of time alone when socialization is not possible  11/3/2020 2037 by Jakub Riley RN  Outcome: Ongoing  11/3/2020 1629 by Armando Waterman RN  Outcome: Ongoing     Problem: Fatigue  Goal: Verbalize increase energy and improved vitality  11/3/2020 2037 by Jakub Riley RN  Outcome: Ongoing  11/3/2020 1629 by Armando Waterman RN  Outcome: Ongoing     Problem: Patient Education: Go to Patient Education Activity  Goal: Patient/Family Education  11/3/2020 2037 by Jakub Riley RN  Outcome: Ongoing  11/3/2020 1629 by Armando Waterman RN  Outcome: Ongoing     Problem: Pain:  Goal: Pain level will decrease  Description: Pain level will decrease  11/3/2020 2037 by Jakub Riley RN  Outcome: Ongoing  11/3/2020 1629 by Armando Waterman RN  Outcome: Ongoing  Goal: Control of acute pain  Description: Control of acute pain  11/3/2020 2037 by Jakub Riley RN  Outcome: Ongoing  11/3/2020 1629 by Armando Waterman RN  Outcome: Ongoing  Goal: Control of chronic pain  Description: Control of chronic pain  11/3/2020 2037 by Cameron Neil RN  Outcome: Ongoing  11/3/2020 1629 by Mis Cristina RN  Outcome: Ongoing     Problem: Skin Integrity:  Goal: Will show no infection signs and symptoms  Description: Will show no infection signs and symptoms  Outcome: Ongoing  Goal: Absence of new skin breakdown  Description: Absence of new skin breakdown  Outcome: Ongoing

## 2020-11-04 NOTE — PLAN OF CARE
Problem: Falls - Risk of:  Goal: Will remain free from falls  Description: Will remain free from falls  Outcome: Ongoing  Goal: Absence of physical injury  Description: Absence of physical injury  Outcome: Ongoing     Problem: Airway Clearance - Ineffective  Goal: Achieve or maintain patent airway  Outcome: Ongoing     Problem: Gas Exchange - Impaired  Goal: Absence of hypoxia  Outcome: Ongoing  Goal: Promote optimal lung function  Outcome: Ongoing     Problem: Breathing Pattern - Ineffective  Goal: Ability to achieve and maintain a regular respiratory rate  Outcome: Ongoing     Problem:  Body Temperature -  Risk of, Imbalanced  Goal: Ability to maintain a body temperature within defined limits  Outcome: Ongoing  Goal: Will regain or maintain usual level of consciousness  Outcome: Ongoing  Goal: Complications related to the disease process, condition or treatment will be avoided or minimized  Outcome: Ongoing     Problem: Isolation Precautions - Risk of Spread of Infection  Goal: Prevent transmission of infection  Outcome: Ongoing     Problem: Nutrition Deficits  Goal: Optimize nutrtional status  Outcome: Ongoing     Problem: Risk for Fluid Volume Deficit  Goal: Maintain normal heart rhythm  Outcome: Ongoing  Goal: Maintain absence of muscle cramping  Outcome: Ongoing  Goal: Maintain normal serum potassium, sodium, calcium, phosphorus, and pH  Outcome: Ongoing     Problem: Loneliness or Risk for Loneliness  Goal: Demonstrate positive use of time alone when socialization is not possible  Outcome: Ongoing     Problem: Fatigue  Goal: Verbalize increase energy and improved vitality  Outcome: Ongoing     Problem: Patient Education: Go to Patient Education Activity  Goal: Patient/Family Education  Outcome: Ongoing     Problem: Pain:  Goal: Pain level will decrease  Description: Pain level will decrease  Outcome: Ongoing  Goal: Control of acute pain  Description: Control of acute pain  Outcome: Ongoing  Goal: Control of chronic pain  Description: Control of chronic pain  Outcome: Ongoing     Problem: Skin Integrity:  Goal: Will show no infection signs and symptoms  Description: Will show no infection signs and symptoms  Outcome: Ongoing  Goal: Absence of new skin breakdown  Description: Absence of new skin breakdown  Outcome: Ongoing     Pt alert and oriented. Denies pain @ this time. VSS. [address] @ 60L. FIO2 @ 100%. [place] remains C/D/I. Currently resting in bed while bed remains in lowest and locked position with bed alarm in place. Bed side table and call light placed within patient reach. Encouraged patient to use call light for staff assistance. Will continue to monitor.

## 2020-11-04 NOTE — PROGRESS NOTES
Pt resting in bed. AAO and cooperative. VSS, patient is afebrile. Appears comfortable in no immediate distress. Becomes dyspnic with cares/stimulation and talking. Becomes hypoxic with activity. Assessment charted. No complaints. Chapstick applied for dry lips. Call light is in reach. Instructed to use for assistance. Denies needs at this time. Will monitor.

## 2020-11-05 ENCOUNTER — APPOINTMENT (OUTPATIENT)
Dept: GENERAL RADIOLOGY | Age: 80
DRG: 871 | End: 2020-11-05
Payer: MEDICARE

## 2020-11-05 LAB
A/G RATIO: 0.8 (ref 1.1–2.2)
ALBUMIN SERPL-MCNC: 2.6 G/DL (ref 3.4–5)
ALP BLD-CCNC: 70 U/L (ref 40–129)
ALT SERPL-CCNC: 35 U/L (ref 10–40)
ANION GAP SERPL CALCULATED.3IONS-SCNC: 8 MMOL/L (ref 3–16)
AST SERPL-CCNC: 19 U/L (ref 15–37)
BASOPHILS ABSOLUTE: 0 K/UL (ref 0–0.2)
BASOPHILS RELATIVE PERCENT: 0.1 %
BILIRUB SERPL-MCNC: 0.5 MG/DL (ref 0–1)
BUN BLDV-MCNC: 16 MG/DL (ref 7–20)
CALCIUM SERPL-MCNC: 8.5 MG/DL (ref 8.3–10.6)
CHLORIDE BLD-SCNC: 104 MMOL/L (ref 99–110)
CO2: 25 MMOL/L (ref 21–32)
CREAT SERPL-MCNC: <0.5 MG/DL (ref 0.6–1.2)
D DIMER: 329 NG/ML DDU (ref 0–229)
EOSINOPHILS ABSOLUTE: 0 K/UL (ref 0–0.6)
EOSINOPHILS RELATIVE PERCENT: 0 %
GFR AFRICAN AMERICAN: >60
GFR NON-AFRICAN AMERICAN: >60
GLOBULIN: 3.1 G/DL
GLUCOSE BLD-MCNC: 122 MG/DL (ref 70–99)
HCT VFR BLD CALC: 40.6 % (ref 36–48)
HEMOGLOBIN: 13.8 G/DL (ref 12–16)
LYMPHOCYTES ABSOLUTE: 0.7 K/UL (ref 1–5.1)
LYMPHOCYTES RELATIVE PERCENT: 8.3 %
MCH RBC QN AUTO: 30.8 PG (ref 26–34)
MCHC RBC AUTO-ENTMCNC: 34 G/DL (ref 31–36)
MCV RBC AUTO: 90.6 FL (ref 80–100)
MONOCYTES ABSOLUTE: 0.5 K/UL (ref 0–1.3)
MONOCYTES RELATIVE PERCENT: 5.2 %
NEUTROPHILS ABSOLUTE: 7.7 K/UL (ref 1.7–7.7)
NEUTROPHILS RELATIVE PERCENT: 86.4 %
PDW BLD-RTO: 13.4 % (ref 12.4–15.4)
PLATELET # BLD: 279 K/UL (ref 135–450)
PMV BLD AUTO: 7.8 FL (ref 5–10.5)
POTASSIUM SERPL-SCNC: 3.7 MMOL/L (ref 3.5–5.1)
RBC # BLD: 4.48 M/UL (ref 4–5.2)
SODIUM BLD-SCNC: 137 MMOL/L (ref 136–145)
TOTAL PROTEIN: 5.7 G/DL (ref 6.4–8.2)
WBC # BLD: 9 K/UL (ref 4–11)

## 2020-11-05 PROCEDURE — 36415 COLL VENOUS BLD VENIPUNCTURE: CPT

## 2020-11-05 PROCEDURE — 2700000000 HC OXYGEN THERAPY PER DAY

## 2020-11-05 PROCEDURE — 2580000003 HC RX 258: Performed by: INTERNAL MEDICINE

## 2020-11-05 PROCEDURE — 6370000000 HC RX 637 (ALT 250 FOR IP): Performed by: INTERNAL MEDICINE

## 2020-11-05 PROCEDURE — 6360000002 HC RX W HCPCS: Performed by: INTERNAL MEDICINE

## 2020-11-05 PROCEDURE — 2000000000 HC ICU R&B

## 2020-11-05 PROCEDURE — 71045 X-RAY EXAM CHEST 1 VIEW: CPT

## 2020-11-05 PROCEDURE — 99291 CRITICAL CARE FIRST HOUR: CPT | Performed by: INTERNAL MEDICINE

## 2020-11-05 PROCEDURE — 94761 N-INVAS EAR/PLS OXIMETRY MLT: CPT

## 2020-11-05 PROCEDURE — 85379 FIBRIN DEGRADATION QUANT: CPT

## 2020-11-05 PROCEDURE — 85025 COMPLETE CBC W/AUTO DIFF WBC: CPT

## 2020-11-05 PROCEDURE — 99233 SBSQ HOSP IP/OBS HIGH 50: CPT | Performed by: INTERNAL MEDICINE

## 2020-11-05 PROCEDURE — 94640 AIRWAY INHALATION TREATMENT: CPT

## 2020-11-05 PROCEDURE — 80053 COMPREHEN METABOLIC PANEL: CPT

## 2020-11-05 PROCEDURE — 2500000003 HC RX 250 WO HCPCS: Performed by: INTERNAL MEDICINE

## 2020-11-05 RX ADMIN — IPRATROPIUM BROMIDE 2 PUFF: 17 AEROSOL, METERED RESPIRATORY (INHALATION) at 08:25

## 2020-11-05 RX ADMIN — ASPIRIN 81 MG: 81 TABLET, COATED ORAL at 08:22

## 2020-11-05 RX ADMIN — ALBUTEROL SULFATE 2 PUFF: 90 AEROSOL, METERED RESPIRATORY (INHALATION) at 16:49

## 2020-11-05 RX ADMIN — ALBUTEROL SULFATE 2 PUFF: 90 AEROSOL, METERED RESPIRATORY (INHALATION) at 08:25

## 2020-11-05 RX ADMIN — Medication 500 MG: at 08:22

## 2020-11-05 RX ADMIN — IPRATROPIUM BROMIDE 2 PUFF: 17 AEROSOL, METERED RESPIRATORY (INHALATION) at 19:23

## 2020-11-05 RX ADMIN — Medication 10 ML: at 08:26

## 2020-11-05 RX ADMIN — ENOXAPARIN SODIUM 30 MG: 30 INJECTION SUBCUTANEOUS at 21:06

## 2020-11-05 RX ADMIN — Medication 10 ML: at 21:06

## 2020-11-05 RX ADMIN — ACETAMINOPHEN 650 MG: 325 TABLET ORAL at 18:09

## 2020-11-05 RX ADMIN — VENLAFAXINE HYDROCHLORIDE 225 MG: 150 CAPSULE, EXTENDED RELEASE ORAL at 08:21

## 2020-11-05 RX ADMIN — ENOXAPARIN SODIUM 30 MG: 30 INJECTION SUBCUTANEOUS at 08:21

## 2020-11-05 RX ADMIN — DEXAMETHASONE SODIUM PHOSPHATE 20 MG: 4 INJECTION, SOLUTION INTRA-ARTICULAR; INTRALESIONAL; INTRAMUSCULAR; INTRAVENOUS; SOFT TISSUE at 10:00

## 2020-11-05 RX ADMIN — REMDESIVIR 100 MG: 100 INJECTION, POWDER, LYOPHILIZED, FOR SOLUTION INTRAVENOUS at 10:40

## 2020-11-05 RX ADMIN — IPRATROPIUM BROMIDE 2 PUFF: 17 AEROSOL, METERED RESPIRATORY (INHALATION) at 16:50

## 2020-11-05 RX ADMIN — LEVOTHYROXINE SODIUM 112 MCG: 0.11 TABLET ORAL at 08:22

## 2020-11-05 RX ADMIN — ANTACID TABLETS 500 MG: 500 TABLET, CHEWABLE ORAL at 21:08

## 2020-11-05 RX ADMIN — IPRATROPIUM BROMIDE 2 PUFF: 17 AEROSOL, METERED RESPIRATORY (INHALATION) at 11:45

## 2020-11-05 RX ADMIN — ALBUTEROL SULFATE 2 PUFF: 90 AEROSOL, METERED RESPIRATORY (INHALATION) at 11:45

## 2020-11-05 RX ADMIN — ALBUTEROL SULFATE 2 PUFF: 90 AEROSOL, METERED RESPIRATORY (INHALATION) at 19:23

## 2020-11-05 ASSESSMENT — PAIN SCALES - WONG BAKER
WONGBAKER_NUMERICALRESPONSE: 0

## 2020-11-05 ASSESSMENT — PAIN SCALES - GENERAL
PAINLEVEL_OUTOF10: 0
PAINLEVEL_OUTOF10: 3
PAINLEVEL_OUTOF10: 0

## 2020-11-05 NOTE — PROGRESS NOTES
Progress Note - Dr. Lazara Peña - Internal Medicine  PCP: Vinod Collazo MD [id number] / [place] [id number] [address] Day: 5  Code Status: Full Code  Current Diet: DIET GENERAL;        CC: follow up on medical issues    Subjective:   Enoc Gutierrez is a 67 y.o. female. She denies problems    unchanged  Now on heated 60L   Appreciate pulm/ID eval  Case d/w dr Giovanna Arndt on remdesivir, today is day 5    She denies chest pain, complains of shortness of breath, denies nausea,  denies emesis. 10 system Review of Systems is reviewed with patient, and pertinent positives are noted in HPI above . Otherwise, Review of systems is negative. I have reviewed the patient's medical and social history in detail and updated the computerized patient record. To recap: She  has a past medical history of Back pain, Cancer ([facility] [address] Essential hypertension, Headache, Hypothyroidism, Migraines, Thyroid disease, and UTI (lower urinary tract infection). . She  has a past surgical history that includes Bladder surgery (08/2016) and Mohs surgery (01/10/2019). . She  reports that she has quit smoking. She has never used smokeless tobacco. She reports current alcohol use. Keyshawn Knight         Active Hospital Problems    Diagnosis Date Noted    Acute respiratory failure due to COVID-19 ([facility] [address] [U07.1, J96.00] 11/02/2020    Fever [R50.9]     Elevated d-dimer [R79.89]     Elevated C-reactive protein (CRP) [R79.82]     Essential hypertension [I10]     Ex-smoker [Z87.891]     Overweight [E66.3]     COVID-19 [U07.1] 10/31/2020    COVID-19 virus infection [U07.1] 10/31/2020    Depression [F32.9] 10/31/2020    Hypothyroid [E03.9] 10/31/2020       Current Facility-Administered Medications: calcium carbonate (TUMS) chewable tablet 500 mg, 500 mg, Oral, TID PRN  lip balm petroleum free (PHYTOPLEX) stick, , Topical, PRN  dexamethasone (DECADRON) 20 mg in sodium chloride 0.9 % IVPB, 20 mg, Intravenous, Daily  [COMPLETED] remdesivir 200 mg in sodium chloride 0.9 % 250 mL IVPB, 200 mg, Intravenous, Once **FOLLOWED BY** remdesivir 100 mg in sodium chloride 0.9 % 250 mL IVPB, 100 mg, Intravenous, Q24H  0.9 % sodium chloride bolus, 30 mL, Intravenous, PRN  enoxaparin (LOVENOX) injection 30 mg, 30 mg, Subcutaneous, BID  albuterol sulfate  (90 Base) MCG/ACT inhaler 2 puff, 2 puff, Inhalation, Q6H PRN  ALPRAZolam (XANAX) tablet 0.5 mg, 0.5 mg, Oral, Nightly PRN  levothyroxine (SYNTHROID) tablet 112 mcg, 112 mcg, Oral, Daily  venlafaxine (EFFEXOR XR) extended release capsule 225 mg, 225 mg, Oral, Daily  aspirin EC tablet 81 mg, 81 mg, Oral, Daily  calcium elemental (OSCAL) tablet 500 mg, 500 mg, Oral, Daily  sodium chloride flush 0.9 % injection 10 mL, 10 mL, Intravenous, 2 times per day  sodium chloride flush 0.9 % injection 10 mL, 10 mL, Intravenous, PRN  acetaminophen (TYLENOL) tablet 650 mg, 650 mg, Oral, Q6H PRN **OR** acetaminophen (TYLENOL) suppository 650 mg, 650 mg, Rectal, Q6H PRN  magnesium hydroxide (MILK OF MAGNESIA) 400 MG/5ML suspension 30 mL, 30 mL, Oral, Daily PRN  promethazine (PHENERGAN) tablet 12.5 mg, 12.5 mg, Oral, Q6H PRN **OR** ondansetron (ZOFRAN) injection 4 mg, 4 mg, Intravenous, Q6H PRN  0.9 % sodium chloride bolus, 500 mL, Intravenous, PRN  potassium chloride (KLOR-CON M) extended release tablet 40 mEq, 40 mEq, Oral, PRN **OR** potassium bicarb-citric acid (EFFER-K) effervescent tablet 40 mEq, 40 mEq, Oral, PRN **OR** potassium chloride 10 mEq/100 mL IVPB (Peripheral Line), 10 mEq, Intravenous, PRN  labetalol (NORMODYNE;TRANDATE) injection 10 mg, 10 mg, Intravenous, Q6H PRN  albuterol sulfate  (90 Base) MCG/ACT inhaler 2 puff, 2 puff, Inhalation, 4x daily **AND** ipratropium (ATROVENT HFA) 17 MCG/ACT inhaler 2 puff, 2 puff, Inhalation, 4x daily **AND** MDI Treatment, , , 4x daily         Objective:  BP (!) 143/73   Pulse 65   Temp 98.6 °F (37 °C) (Temporal)   Resp 26   Ht 5' 4\" (1.626 m)   Wt 174 lb (78.9 kg)   SpO2 94%   BMI 29.87 kg/m²      Patient Vitals for the past 24 hrs:   BP Temp Temp src Pulse Resp SpO2 Weight   11/05/20 0825 -- -- -- -- -- 94 % --   11/05/20 0800 (!) 143/73 98.6 °F (37 °C) Temporal 65 26 92 % --   11/05/20 0642 139/76 -- -- 65 -- (!) 87 % --   11/05/20 0600 (!) 139/126 -- -- 62 (!) 36 92 % --   11/05/20 0500 (!) 163/82 -- -- 61 (!) 33 92 % --   11/05/20 0400 (!) 157/81 98.3 °F (36.8 °C) Temporal 60 29 92 % 174 lb (78.9 kg)   11/05/20 0318 -- -- -- -- 22 92 % --   11/05/20 0300 (!) 149/67 -- -- 60 23 90 % --   11/05/20 0200 (!) 151/76 -- -- 58 19 (!) 89 % --   11/05/20 0100 (!) 159/73 -- -- 59 21 91 % --   11/05/20 0006 -- -- -- -- 24 94 % --   11/05/20 0000 (!) 153/86 97.4 °F (36.3 °C) Temporal 67 23 92 % --   11/04/20 2300 134/69 -- -- 63 21 93 % --   11/04/20 2200 (!) 147/74 -- -- 60 24 90 % --   11/04/20 2100 (!) 140/70 -- -- 62 27 91 % --   11/04/20 2000 126/69 97.8 °F (36.6 °C) Temporal 67 25 92 % --   11/04/20 1918 -- -- -- -- 22 93 % --   11/04/20 1900 138/73 -- -- 61 -- 91 % --   11/04/20 1621 118/75 96.6 °F (35.9 °C) Temporal 65 (!) 35 93 % --   11/04/20 1550 -- -- -- -- 30 96 % --   11/04/20 1200 -- -- -- -- 22 -- --   11/04/20 1040 -- -- -- -- 24 94 % --     Patient Vitals for the past 96 hrs (Last 3 readings):   Weight   11/05/20 0400 174 lb (78.9 kg)   11/04/20 0400 179 lb 14.4 oz (81.6 kg)   11/03/20 0400 176 lb 14.4 oz (80.2 kg)           Intake/Output Summary (Last 24 hours) at 11/5/2020 0836  Last data filed at 11/5/2020 0600  Gross per 24 hour   Intake 945.15 ml   Output 1450 ml   Net -504.85 ml         Physical Exam:   BP (!) 143/73   Pulse 65   Temp 98.6 °F (37 °C) (Temporal)   Resp 26   Ht 5' 4\" (1.626 m)   Wt 174 lb (78.9 kg)   SpO2 94%   BMI 29.87 kg/m²     In-person bedside physical examination deferred.    ( based on new provisions and guidance offered by [facility] on March 18, 2020 in setting of COVID 19 outbreak and in order to preserve personal protective equipment in accordance with the flexibilities announced by CMS on March 30, 2020)      Labs:  Lab Results   Component Value Date    WBC 9.0 11/05/2020    HGB 13.8 11/05/2020    HCT 40.6 11/05/2020     11/05/2020    ALT 35 11/05/2020    AST 19 11/05/2020     11/05/2020    K 3.7 11/05/2020     11/05/2020    CREATININE <0.5 (L) 11/05/2020    BUN 16 11/05/2020    CO2 25 11/05/2020    INR 1.10 10/31/2020    LABMICR YES 10/31/2020     Lab Results   Component Value Date    TROPONINI <0.01 11/01/2020       Recent Imaging Results are Reviewed:  Xr Chest Portable    Result Date: 11/2/2020  EXAMINATION: ONE XRAY VIEW OF THE CHEST 11/2/2020 2:51 pm COMPARISON: November 1, 2020 HISTORY: ORDERING SYSTEM PROVIDED HISTORY: ASSESS FOR INFILTRATES TECHNOLOGIST PROVIDED HISTORY: Reason for exam:->ASSESS FOR INFILTRATES Reason for Exam: ASSESS FOR INFILTRATES Acuity: Acute Type of Exam: Initial FINDINGS: Cardiac silhouette is enlarged. No pneumothorax. Small left pleural effusion, unchanged. Multifocal airspace opacities which appears slightly progressed on the right. No acute bony abnormality. Multifocal airspace opacities, slightly progressed on the right. Xr Chest Portable    Result Date: 11/1/2020  EXAMINATION: ONE XRAY VIEW OF THE CHEST 11/1/2020 5:06 am COMPARISON: Chest radiograph 10/31/2020 HISTORY: ORDERING SYSTEM PROVIDED HISTORY: Increase O2 demands TECHNOLOGIST PROVIDED HISTORY: Reason for exam:->Increase O2 demands Reason for Exam: Increase O2 demands Acuity: Unknown Type of Exam: Unknown FINDINGS: No substantial change in peripheral, mid and lower lung predominant airspace disease. No pneumothorax or pleural effusion. No new focal airspace disease. Stable cardiomediastinal contours, partially obscured. Intact skeleton. No acute interval change.   Bilateral airspace disease is similar to prior exam.     Xr Chest Portable    Result Date: 10/31/2020  EXAMINATION: ONE XRAY VIEW OF THE CHEST 10/31/2020 3:29 pm COMPARISON: None. HISTORY: ORDERING SYSTEM PROVIDED HISTORY: SOB TECHNOLOGIST PROVIDED HISTORY: Reason for exam:->SOB Reason for Exam: COVID+, low O2 sats Acuity: Acute Type of Exam: Initial FINDINGS: The cardiac silhouette is mildly prominent. Multifocal ground-glass opacification, particularly in the perihilar regions and left lung base. There is no pleural fluid. There is sparing of the lung apices. Multifocal ground-glass opacification likely pneumonia including atypical viral pneumonia. Pulmonary edema is a less likely etiology for the findings. Assessment and Plan:  Principal Problem:    Acute respiratory failure due to COVID-19 [facility]) -Established problem. Uncontrolled. Remains on high flow O2/vapotherm  Plan: cont o2, cont remdesivir. If does not improve, may need intubtaion  Active Problems:    COVID-19 -Established problem. Still on high flow o2  Plan: as above, remdesivir day #5 today    Depression -Established problem. Stable. no issues with mood  Plan: Continue present orders/plan. Hypothyroid -Established problem. Stable. Plan: cont synthroid    Essential hypertension -Established problem. Stable.   143/73  Plan: stay on same meds    Total critical care time caring for this patient with life threatening illness, including direct patient contact, management of life support systems, review of data including imaging and labs, discussions with other team members and physicians at least 37 minutes today                Pedro Pablo Arambula  11/5/2020

## 2020-11-05 NOTE — PLAN OF CARE
Problem: Falls - Risk of:  Goal: Will remain free from falls  Description: Will remain free from falls  11/4/2020 2149 by Zeb Escalante RN  Outcome: Ongoing     Problem: Falls - Risk of:  Goal: Absence of physical injury  Description: Absence of physical injury  11/4/2020 2149 by Zeb Escalante RN  Outcome: Ongoing  Patient educated on the importance of calling out before exiting the bed and always waiting for assistance. Verbalized understanding. Bed in lowest position with wheels locked. Alarm activated. 3/4 side rails up. Non-skid socks on. Call light within reach. Hourly checks. All requested needs provided. Problem: Pain:  Goal: Pain level will decrease  Description: Pain level will decrease  11/4/2020 2149 by Zeb Escalante RN  Outcome: Ongoing     Problem: Pain:  Goal: Control of acute pain  Description: Control of acute pain  11/4/2020 2149 by Zeb Escalante RN  Outcome: Ongoing     Problem: Pain:  Goal: Control of chronic pain  Description: Control of chronic pain  11/4/2020 2149 by Zeb Escalante RN  Outcome: Ongoing  No c/o pain. 0/10 pain level. No evidence of distress. Problem: Skin Integrity:  Goal: Will show no infection signs and symptoms  Description: Will show no infection signs and symptoms  11/4/2020 2149 by Zeb Escalante RN  Outcome: Ongoing     Problem: Skin Integrity:  Goal: Absence of new skin breakdown  Description: Absence of new skin breakdown  11/4/2020 2149 by Zeb Escalante RN  Outcome: Ongoing  Able to turn self in bed and educated on the importance of frequent repositioning for the prevention of pressure ulcer formation.          Problem: Airway Clearance - Ineffective  Goal: Achieve or maintain patent airway  11/4/2020 2149 by Zeb Escalante RN  Outcome: Ongoing     Problem: Gas Exchange - Impaired  Goal: Absence of hypoxia  11/4/2020 2149 by Zeb Escalante RN  Outcome: Ongoing     Problem: Gas Exchange - Impaired  Goal: Promote optimal lung function  11/4/2020 2149 by Arabella Pimentel RN  Outcome: Ongoing  On heated high flow on 60 L @ 100%. Problem: Breathing Pattern - Ineffective  Goal: Ability to achieve and maintain a regular respiratory rate  11/4/2020 2149 by Arabella Pimentel, RN  Outcome: Not Met This Shift  Tachypneic. Problem: Body Temperature -  Risk of, Imbalanced  Goal: Ability to maintain a body temperature within defined limits  11/4/2020 2149 by Arabella Pimentel RN  Outcome: Ongoing  Body temp WNL. Problem: Body Temperature -  Risk of, Imbalanced  Goal: Will regain or maintain usual level of consciousness  11/4/2020 2149 by Arabella Pimentel RN  Outcome: Ongoing  Patient is alert and oriented x4. Follows commands and demonstrates appropriate judgment, safety awareness, and attention/concentration. Problem: Isolation Precautions - Risk of Spread of Infection  Goal: Prevent transmission of infection  11/4/2020 2149 by Arabella Pimentel RN  Outcome: Ongoing  Hands washed before entering and exiting room. Proper PPE worn in room. Appropriate caution practiced. Problem: Nutrition Deficits  Goal: Optimize nutrtional status  11/4/2020 2149 by Arabella Pimentel RN  Outcome: Ongoing  Does not eat provided meals, but does eat an adequate amount of food brought in by family. Encouraged to at least always drink her Ensures and she verbalized that she would.

## 2020-11-05 NOTE — PROGRESS NOTES
Infectious Diseases   Progress Note      Admission Date: 10/31/2020  Hospital Day: Hospital Day: 6   Attending: Kiana Lora MD  Date of service: 11/5/2020     Chief complaint/ Reason for consult:     · Acute respiratory failure with hypoxia  · COVID-19 pneumonia  · Elevated D-dimer  · Elevated CRP in setting of COVID-19  · Leukopenia in setting of COVID-19    Microbiology:        I have reviewed allavailable micro lab data and cultures    Blood culture (2/2) - collected on 10/31/2020: Negative so far    Antibiotics and immunizations:       Current antibiotics: All antibiotics and their doses were reviewed by me    Recent Abx Admin                   remdesivir 100 mg in sodium chloride 0.9 % 250 mL IVPB (mg) 100 mg New Bag 11/05/20 1040                  Immunization History: All immunization history was reviewed by me today. There is no immunization history on file for this patient. Known drug allergies: All allergies were reviewed and updated    No Known Allergies    Social history:     Social History:  All social andepidemiologic history was reviewed and updated by me today as needed. · Tobacco use:   reports that she has quit smoking. She has never used smokeless tobacco.  · Alcohol use:   reports current alcohol use. · Currently lives in: [address] [place] [address] Dr Carrasco  has no history on file for drug. Assessment:     The patient is a 67 y.o. old female who  has a past medical history of Back pain, Cancer ([address] Essential hypertension, Headache, Hypothyroidism, Migraines, Thyroid disease, and UTI (lower urinary tract infection).  with following problems:    · Acute respiratory failure with hypoxia-today is day 5 of remdesivir, has been moved to COVID-19 ICU, currently on 60 L oxygen-nasal cannula  · COVID-19 pneumonia-this is ongoing  · Elevated K-zqvvr-B-dimer 329  · Elevated CRP in setting of COVID-19-continue to monitor trends  · Leukopenia in setting of COVID-19  · Essential hypertension-BP okay so far  · Hypothyroidism  · Ex-smoker  · Overweight* due to excess calorie intake : Body mass index is 29.87 kg/m². Discussion:      The patient is on IV remdesivir and IV Decadron. Her oxygen requirement is still quite high. She is on 60 L oxygen via high flow nasal cannula. Serum creatinine 0.5. Liver enzymes are okay. White cell count is 9000. D-dimer is 329. Portable chest x-ray from today reviewed. Shows ongoing bilateral COVID-19 pneumonia. Plan:     Diagnostic Workup:    · Continue to monitor LDH, procalcitonin, D-dimer, CRP every other day  · Continue to follow  fever curve, WBC count and blood cultures  · Follow up on liver and renal function, blood counts    Antimicrobials:    · Will extend IV remdesivir for 3 more days given patient's high oxygen requirement  · Continue IV Decadron 20 mg daily  · Maintain good glycemic control while on Decadron and remdesivir  · Encourage frequent change in posture in prone positioning as tolerated  · Continue to watch for new fever or productive cough or other signs of secondary bacterial infection  · No indication for empiric antibiotics at this time  · Cough and deep breathing exercises  · Aspiration precautions  · Fall precautions  · Anticoagulation per primary and pulmonary  · The patient is critically ill. High risk of morbidity and mortality  · Agree with transfer to [place] [address] ICU. Continue close monitoring      Drug Monitoring:    · Continue monitoring for antibiotic toxicity as follows: CBC, CMP  · Continue to watch for following: new or worsening fever, new hypotension, hives, lip swelling and redness or purulence at vascular access sites. I/v access Management:    · Continue to monitor i.v access sites for erythema, induration, discharge or tenderness. · As always, continue efforts to minimize tubes/lines/drains as clinically appropriate to reduce chances of line associated infections.       Risk of Complications/Morbidity/Mortality: High     · Patient is critically ill and has a potentially life threatening infection that poses threat to life/bodily function. · There is potential for worsening infection/ sudden clinically significant or life-threatening deterioration in the patient's condition without appropriate antimicrobial therapy. · Complex medical decision making process was involved to select appropriate antimicrobial agents to reverse the cause of patient's severe infection/ illness. · Antimicrobial therapy requires intensive monitoring for toxicity and frequent dose adjustments to prevent toxicity and permanent end-organ dysfunction. Critical care time: 32 minutes      Thank you for involving me in the care of your patient. I will continue to follow. If you have anyadditional questions, please do not hesitate to contact me. Subjective: Interval history: Interval history was obtained from chart review and RN. She is on 60 L oxygen via high flow nasal cannula, she has been moved to the [place] [address] ICU. She remains on IV remdesivir and Decadron    REVIEW OF SYSTEMS:      Review of Systems   Unable to perform ROS: Acuity of condition         Past Medical History: All past medical history reviewed today. 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 Past Surgical History: All past surgical history was reviewed today. Past Surgical History:   Procedure Laterality Date    BLADDER SURGERY  08/2016    restructured bladder     MOHS SURGERY  01/10/2019    right jawline anterior inferior       Family History: All family history was reviewed today.         Problem Relation Age of Onset    Heart Disease Daughter        Objective: PHYSICAL EXAM:      Vitals:   Vitals:    11/05/20 1100 11/05/20 1200 11/05/20 1300 11/05/20 1400   BP: 128/69 126/71 (!) 101/44 113/61   Pulse: 65 64 72 68   Resp:  22     Temp:  98.8 °F (37.1 °C)     TempSrc:  Temporal     SpO2: 91% 96% 92% 97%   Weight:       Height:           Physical Exam       PHYSICAL EXAM:     In-person bedside physical examination deferred. Pursuant to the emergency declaration under the [address] [place] [address], [address] [place] [address] authority and the [employer] Act, this clinical encounter was conducted to provide necessary medical care. (Also consistent with new provisions and guidance offered by [facility] on March 18, 2020 in setting of COVID 19 outbreak and in order to preserve personal protective equipment in accordance with the flexibilities announced by CMS on March 30, 2020)   References: https://[facility]. [facility]/Portals/0/Resources/COVID-19/3_18%20Telemed%20Guidance%20Updated%20March%2018. pdf?ctp=[email or web address]                      https://[facility]. [facility]/Portals/0/Resources/COVID-19/3_18%20Telemed%20Guidance%20Updated%20March%2018. pdf?dsr=[email or web address]                      http://[employer]/. pdf                            General: Awake and oriented to time place and person according to primary service, vitals reviewed  HEENT: Deferred  Cardiovascular: Telemetry data reviewed, rest deferred   Pulmonary: deferred  Abdomen/GI: deferred  Neuro: deferred  Skin: deferred  Musculoskeletal:  deferred  Genitourinary: Deferred  Psych: deferred  Lymphatic/Immunologic: deferred    Intake and output:    I/O last 3 completed shifts: In: 705.2 [I.V.:705.2]  Out: 2000 [Urine:2000]    Lab Data:   All available labs and old records have been reviewed by me.     CBC:  Recent Labs     11/03/20  0434 11/04/20  0431 11/05/20  0417   WBC 7.7 8.0 9.0 RBC 4.23 4.36 4.48   HGB 12.9 13.0 13.8   HCT 38.2 39.5 40.6    261 279   MCV 90.3 90.6 90.6   MCH 30.5 29.8 30.8   MCHC 33.8 32.9 34.0   RDW 13.6 13.9 13.4        BMP:  Recent Labs     11/03/20  0434 11/04/20  0431 11/05/20  0417    137 137   K 3.7 3.8 3.7    104 104   CO2 24 23 25   BUN 22* 19 16   CREATININE <0.5* <0.5* <0.5*   CALCIUM 8.6 8.5 8.5   GLUCOSE 141* 128* 122*        Hepatic Function Panel:   Lab Results   Component Value Date    ALKPHOS 70 11/05/2020    ALT 35 11/05/2020    AST 19 11/05/2020    PROT 5.7 11/05/2020    BILITOT 0.5 11/05/2020    LABALBU 2.6 11/05/2020       CPK: No results found for: CKTOTAL  ESR: No results found for: SEDRATE  CRP:   Lab Results   Component Value Date    .1 (H) 11/01/2020           Imaging: All pertinent images and reports for the current visit were reviewed by me during this visit. XR CHEST PORTABLE   Final Result   Unchanged chest over the past 3 days with diffuse pulmonary disease, favored   to be related to pneumonia         XR CHEST PORTABLE   Final Result   Multifocal airspace opacities, slightly progressed on the right. XR CHEST PORTABLE   Final Result   No acute interval change. Bilateral airspace disease is similar to prior   exam.         XR CHEST PORTABLE   Final Result   Multifocal ground-glass opacification likely pneumonia including atypical   viral pneumonia. Pulmonary edema is a less likely etiology for the findings. Medications: All current and past medications were reviewed.      dexamethasone  20 mg Intravenous Daily    enoxaparin  30 mg Subcutaneous BID    levothyroxine  112 mcg Oral Daily    venlafaxine  225 mg Oral Daily    aspirin  81 mg Oral Daily    calcium elemental  500 mg Oral Daily    sodium chloride flush  10 mL Intravenous 2 times per day    albuterol sulfate HFA  2 puff Inhalation 4x daily    And    ipratropium  2 puff Inhalation 4x daily           calcium carbonate, lip balm petroleum free, sodium chloride, albuterol sulfate HFA, ALPRAZolam, sodium chloride flush, acetaminophen **OR** acetaminophen, magnesium hydroxide, promethazine **OR** ondansetron, sodium chloride, potassium chloride **OR** potassium alternative oral replacement **OR** potassium chloride, labetalol      Problem list:       Patient Active Problem List   Diagnosis Code    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 Although every effort was made to ensure the accuracy of this automated transcription, some errors in transcription may have occurred inadvertently. If you may need any clarification, please do not hesitate to contact me through [employer] or at the phone number provided below with my electronic signature. Any pictures or media included in this note were obtained after taking informed verbal consent from the patient and with their approval to include those in the patient's medical record.     Rona Yang MD, MPH  11/5/2020 , 3:58 PM   [facility] Infectious Disease   [address] [place] [address], [address] [place] [address]  Office: [phone]  Fax: [phone]  Clinic days:  Tuesday & Thursday

## 2020-11-05 NOTE — PROGRESS NOTES
Shift handoff and assessment complete. Patient is in droplet plus iso for +COVID-19. Proper PPE applied when entering room: gown, gloves, face mask, and PAPR. Nursing care clustered for the safety of the patient, RN, and rest of the unit.      Alert and oriented x4. From home and independent/ambulatory at baseline. Sating 92% on vapotherm: 60 L at 100% fiO2. Dyspnea with exertion, but recovering quickly. End expiratory wheezes in BUL. Tachypneic. R wrist PIV normal saline locked. Rankin patent with nile urine. In NSR. Abdomen soft and rounded with active bowel sounds in all four quadrants. Skin is pale, warm, dry, and intact. +1 pitting edema in BLE. Scattered bruising with great amounts of moles. CHG bath given with deodorant and powder. Rankin and lulú-care performed. New bed pad, gown, pillow cases, and draw sheet. Hair washed with shampoo cap and combed. Warm-water face wash. Oral care performed by patient. Warm blanket provided. Able to turn self in bed and educated on the importance of frequent repositioning for the prevent of pressure ulcer formation.

## 2020-11-05 NOTE — PROGRESS NOTES
0000 assessment complete. The patient c/o of burning in her nose. Fluid bag changed on Air-Vo. Received tums around 2300 for indigestion and denies any symptoms of it at this time. VSS. Afebrile. See Flowsheets. No acute changes. Repositioned for comfort and warm blanket provided.

## 2020-11-05 NOTE — PROGRESS NOTES
[facility]    Follow Up Note    Subjective:   CHIEF COMPLAINT / HPI:   Chief Complaint   Patient presents with    Shortness of Breath     Pt brought by [facility] from home with CC of SOB that started Tuesday. Pt reports being dx'ed with COVID on Tuesday reports recent admission at [facility]. pt on RA 88%, 94%on 2L. Interval history: Patient remains alert. She continues to require heated high flow oxygen at 100% and 60 L/min to maintain saturations in the low 90s. She admits her p.o. intake is fairly poor as well. Past Medical History:    Reviewed; no changes    Social History:    Reviewed; no changes    REVIEW OF SYSTEMS:    CONSTITUTIONAL:  negative for fevers and chills  RESPIRATORY:  See HPI  CARDIOVASCULAR:  negative for chest pain, palpitations, edema  GASTROINTESTINAL:  negative for nausea, vomiting, diarrhea, constipation and abdominal pain    Objective:   PHYSICAL EXAM:        VITALS:  BP (!) 143/73   Pulse 65   Temp 98.6 °F (37 °C) (Temporal)   Resp 26   Ht 5' 4\" (1.626 m)   Wt 174 lb (78.9 kg)   SpO2 92%   BMI 29.87 kg/m²  on heated high flow 100% and 60 L/min     24HR INTAKE/OUTPUT:      Intake/Output Summary (Last 24 hours) at 11/5/2020 0816  Last data filed at 11/5/2020 0600  Gross per 24 hour   Intake 945.15 ml   Output 1725 ml   Net -779.85 ml       PHYSICAL EXAM:     In-person bedside physical examination deferred. Pursuant to the emergency declaration under the [facility], [address] [place] [address] authority and the [employer] and [employer] Act, this clinical encounter was conducted to provide necessary medical care.    (Also consistent with new provisions and guidance offered by [facility] on March 18, 2020 in setting of COVID 19 outbreak and in order to preserve personal protective equipment in accordance with the flexibilities announced by CMS on March 30, 2020)   References: https://[facility]. [facility]/Portals/0/Resources/COVID-19/3_18%20Telemed%20Guidance%20Updated%20March%2018. pdf?qjt=[email or web address]                      https://[facility]/Portals/0/Resources/COVID-19/3_18%20Telemed%20Guidance%20Updated%20March%2018. pdf?ttr=[email or web address]                      http://[email or web address] pdf                            General: Awake and oriented to time place and person according to primary service, vitals reviewed  HEENT: Deferred  Cardiovascular: Telemetry data reviewed, rest deferred   Pulmonary: deferred  Abdomen/GI: deferred  Neuro: deferred  Skin: deferred  Musculoskeletal:  deferred  Genitourinary: Deferred  Psych: deferred  Lymphatic/Immunologic: deferred      DATA:    CBC:  Recent Labs     11/03/20 0434 11/04/20 0431 11/05/20 0417   WBC 7.7 8.0 9.0   RBC 4.23 4.36 4.48   HGB 12.9 13.0 13.8   HCT 38.2 39.5 40.6    261 279   MCV 90.3 90.6 90.6   MCH 30.5 29.8 30.8   MCHC 33.8 32.9 34.0   RDW 13.6 13.9 13.4      BMP:  Recent Labs     11/03/20 0434 11/04/20 0431 11/05/20 0417    137 137   K 3.7 3.8 3.7    104 104   CO2 24 23 25   BUN 22* 19 16   CREATININE <0.5* <0.5* <0.5*   CALCIUM 8.6 8.5 8.5   GLUCOSE 141* 128* 122*      ABG:  No results for input(s): PHART, JXR2PWB, PO2ART, RQA0HAH, H5FDJVDJ, BEART in the last 72 hours. Cultures:    Abx:    Radiology Review:  Pertinent images / reports were reviewed as a part of this visit. Assessment:     1. Acute hypoxemic respiratory failure  2. SARS-CoV-2 pneumonia  3. ARDS    I have reviewed laboratories, medical records and images for this visit  Chest imaging reveals severe diffuse airspace disease similar to previous  SARS-CoV-2 positive  Now requiring heated high flow oxygen 100% FiO2 and 60 L/min flow. ID following. The patient has received convalescent plasma. On day #5 of remdesivir. Also receiving Decadron 20 mg daily.   Oxygen saturations are holding in the low 90s.   Continue supportive care

## 2020-11-05 NOTE — PROGRESS NOTES
Shift assessment complete, see flowsheet. VSS, pt is on AirVo 60 L 100% FiO2. Dyspnea with exertion, but recovering quickly. End expiratory wheezes in BUL. Tachypneic. R wrist PIV normal saline locked. Rankin patent with nile urine. In NSR. Abdomen soft and rounded with active bowel sounds in all four quadrants. Skin is pale, warm, dry, and intact. +1 pitting edema in BLE. Scattered bruising with great amounts of moles. Pt is able to turn self in bed. No signs of distress, though pt does appear anxious. [place] updated, POC discussed. Bed in lowest position with wheels locked and alarm on.  No further needs at this time, will continue to monitor.

## 2020-11-05 NOTE — PROGRESS NOTES
0400 assessment complete. In and out of sleep. Easily aroused by verbal stimuli. End expiratory wheezes remain audible in BUL. Tachypneic. Denies any pain. Repositioned for comfort and the prevention of pressure ulcer formation.

## 2020-11-05 NOTE — PROGRESS NOTES
[address] [facility] or Facility: F   From: Jose Fish   RE: Brandon Mackenzie 1940 RM: [id number]     Patient is requesting tums for her indigestion.

## 2020-11-06 LAB
ANION GAP SERPL CALCULATED.3IONS-SCNC: 11 MMOL/L (ref 3–16)
BASOPHILS ABSOLUTE: 0 K/UL (ref 0–0.2)
BASOPHILS RELATIVE PERCENT: 0.1 %
BUN BLDV-MCNC: 16 MG/DL (ref 7–20)
C-REACTIVE PROTEIN: 167.7 MG/L (ref 0–5.1)
CALCIUM SERPL-MCNC: 8 MG/DL (ref 8.3–10.6)
CHLORIDE BLD-SCNC: 101 MMOL/L (ref 99–110)
CO2: 23 MMOL/L (ref 21–32)
CREAT SERPL-MCNC: <0.5 MG/DL (ref 0.6–1.2)
EOSINOPHILS ABSOLUTE: 0.1 K/UL (ref 0–0.6)
EOSINOPHILS RELATIVE PERCENT: 0.7 %
GFR AFRICAN AMERICAN: >60
GFR NON-AFRICAN AMERICAN: >60
GLUCOSE BLD-MCNC: 90 MG/DL (ref 70–99)
HCT VFR BLD CALC: 40.6 % (ref 36–48)
HEMOGLOBIN: 13.7 G/DL (ref 12–16)
LYMPHOCYTES ABSOLUTE: 0.8 K/UL (ref 1–5.1)
LYMPHOCYTES RELATIVE PERCENT: 8.1 %
MCH RBC QN AUTO: 30 PG (ref 26–34)
MCHC RBC AUTO-ENTMCNC: 33.7 G/DL (ref 31–36)
MCV RBC AUTO: 89.2 FL (ref 80–100)
MONOCYTES ABSOLUTE: 0.2 K/UL (ref 0–1.3)
MONOCYTES RELATIVE PERCENT: 2.2 %
NEUTROPHILS ABSOLUTE: 9 K/UL (ref 1.7–7.7)
NEUTROPHILS RELATIVE PERCENT: 88.9 %
PDW BLD-RTO: 13.5 % (ref 12.4–15.4)
PLATELET # BLD: 315 K/UL (ref 135–450)
PMV BLD AUTO: 8.1 FL (ref 5–10.5)
POTASSIUM SERPL-SCNC: 3.6 MMOL/L (ref 3.5–5.1)
RBC # BLD: 4.56 M/UL (ref 4–5.2)
SODIUM BLD-SCNC: 135 MMOL/L (ref 136–145)
WBC # BLD: 10.1 K/UL (ref 4–11)

## 2020-11-06 PROCEDURE — 2580000003 HC RX 258: Performed by: INTERNAL MEDICINE

## 2020-11-06 PROCEDURE — 85025 COMPLETE CBC W/AUTO DIFF WBC: CPT

## 2020-11-06 PROCEDURE — 6370000000 HC RX 637 (ALT 250 FOR IP): Performed by: INTERNAL MEDICINE

## 2020-11-06 PROCEDURE — 99233 SBSQ HOSP IP/OBS HIGH 50: CPT | Performed by: INTERNAL MEDICINE

## 2020-11-06 PROCEDURE — 2500000003 HC RX 250 WO HCPCS: Performed by: INTERNAL MEDICINE

## 2020-11-06 PROCEDURE — 94640 AIRWAY INHALATION TREATMENT: CPT

## 2020-11-06 PROCEDURE — 94761 N-INVAS EAR/PLS OXIMETRY MLT: CPT

## 2020-11-06 PROCEDURE — 80048 BASIC METABOLIC PNL TOTAL CA: CPT

## 2020-11-06 PROCEDURE — 6360000002 HC RX W HCPCS: Performed by: INTERNAL MEDICINE

## 2020-11-06 PROCEDURE — 36415 COLL VENOUS BLD VENIPUNCTURE: CPT

## 2020-11-06 PROCEDURE — 2700000000 HC OXYGEN THERAPY PER DAY

## 2020-11-06 PROCEDURE — 86140 C-REACTIVE PROTEIN: CPT

## 2020-11-06 PROCEDURE — 2000000000 HC ICU R&B

## 2020-11-06 RX ADMIN — ALPRAZOLAM 0.5 MG: 0.5 TABLET ORAL at 01:57

## 2020-11-06 RX ADMIN — PROMETHAZINE HYDROCHLORIDE 12.5 MG: 25 TABLET ORAL at 07:54

## 2020-11-06 RX ADMIN — ASPIRIN 81 MG: 81 TABLET, COATED ORAL at 07:55

## 2020-11-06 RX ADMIN — Medication 10 ML: at 08:05

## 2020-11-06 RX ADMIN — Medication 500 MG: at 07:54

## 2020-11-06 RX ADMIN — ENOXAPARIN SODIUM 30 MG: 30 INJECTION SUBCUTANEOUS at 07:54

## 2020-11-06 RX ADMIN — IPRATROPIUM BROMIDE 2 PUFF: 17 AEROSOL, METERED RESPIRATORY (INHALATION) at 08:43

## 2020-11-06 RX ADMIN — DEXAMETHASONE SODIUM PHOSPHATE 20 MG: 4 INJECTION, SOLUTION INTRA-ARTICULAR; INTRALESIONAL; INTRAMUSCULAR; INTRAVENOUS; SOFT TISSUE at 09:11

## 2020-11-06 RX ADMIN — ALBUTEROL SULFATE 2 PUFF: 90 AEROSOL, METERED RESPIRATORY (INHALATION) at 15:24

## 2020-11-06 RX ADMIN — Medication 10 ML: at 21:13

## 2020-11-06 RX ADMIN — IPRATROPIUM BROMIDE 2 PUFF: 17 AEROSOL, METERED RESPIRATORY (INHALATION) at 19:41

## 2020-11-06 RX ADMIN — VENLAFAXINE HYDROCHLORIDE 225 MG: 150 CAPSULE, EXTENDED RELEASE ORAL at 07:55

## 2020-11-06 RX ADMIN — ALBUTEROL SULFATE 2 PUFF: 90 AEROSOL, METERED RESPIRATORY (INHALATION) at 08:42

## 2020-11-06 RX ADMIN — IPRATROPIUM BROMIDE 2 PUFF: 17 AEROSOL, METERED RESPIRATORY (INHALATION) at 15:24

## 2020-11-06 RX ADMIN — ALBUTEROL SULFATE 2 PUFF: 90 AEROSOL, METERED RESPIRATORY (INHALATION) at 19:42

## 2020-11-06 RX ADMIN — REMDESIVIR 100 MG: 100 INJECTION, POWDER, LYOPHILIZED, FOR SOLUTION INTRAVENOUS at 10:34

## 2020-11-06 RX ADMIN — LEVOTHYROXINE SODIUM 112 MCG: 0.11 TABLET ORAL at 05:29

## 2020-11-06 RX ADMIN — ENOXAPARIN SODIUM 30 MG: 30 INJECTION SUBCUTANEOUS at 21:13

## 2020-11-06 ASSESSMENT — PAIN SCALES - GENERAL
PAINLEVEL_OUTOF10: 0

## 2020-11-06 ASSESSMENT — PAIN SCALES - WONG BAKER
WONGBAKER_NUMERICALRESPONSE: 0

## 2020-11-06 NOTE — PROGRESS NOTES
Progress Note - Dr. Francisca Slater - Internal Medicine  PCP: Young Shields MD [id number] / [facility] [id number] [address] Day: 6  Code Status: Full Code  Current Diet: DIET GENERAL;        CC: follow up on medical issues    Subjective:   Eduadro Sr is a 67 y.o. female. She denies problems    unchanged  Remains on heated 60L   Appreciate pulm/ID eval  Case d/w dr Dutch Cohen and dr Chelsea Wallace - ICU team to take over  Remains on remdesivir, today is day 6 - Course extended per ID      I have reviewed the patient's medical and social history in detail and updated the computerized patient record. To recap: She  has a past medical history of Back pain, Cancer ([facility] Essential hypertension, Headache, Hypothyroidism, Migraines, Thyroid disease, and UTI (lower urinary tract infection). . She  has a past surgical history that includes Bladder surgery (08/2016) and Mohs surgery (01/10/2019). . She  reports that she has quit smoking. She has never used smokeless tobacco. She reports current alcohol use. Critical access hospital Billing         Active Hospital Problems    Diagnosis Date Noted    Acute respiratory failure due to COVID-19 ([facility] [U07.1, J96.00] 11/02/2020    Fever [R50.9]     Elevated d-dimer [R79.89]     Elevated C-reactive protein (CRP) [R79.82]     Essential hypertension [I10]     Ex-smoker [Z87.891]     Overweight [E66.3]     COVID-19 [U07.1] 10/31/2020    COVID-19 virus infection [U07.1] 10/31/2020    Depression [F32.9] 10/31/2020    Hypothyroid [E03.9] 10/31/2020       Current Facility-Administered Medications: remdesivir 100 mg in sodium chloride 0.9 % 250 mL IVPB, 100 mg, Intravenous, Q24H  calcium carbonate (TUMS) chewable tablet 500 mg, 500 mg, Oral, TID PRN  lip balm petroleum free (PHYTOPLEX) stick, , Topical, PRN  dexamethasone (DECADRON) 20 mg in sodium chloride 0.9 % IVPB, 20 mg, Intravenous, Daily  0.9 % sodium chloride bolus, 30 mL, Intravenous, PRN  enoxaparin (LOVENOX) injection 30 mg, 30 mg, Subcutaneous, BID  albuterol sulfate  (90 Base) MCG/ACT inhaler 2 puff, 2 puff, Inhalation, Q6H PRN  ALPRAZolam (XANAX) tablet 0.5 mg, 0.5 mg, Oral, Nightly PRN  levothyroxine (SYNTHROID) tablet 112 mcg, 112 mcg, Oral, Daily  venlafaxine (EFFEXOR XR) extended release capsule 225 mg, 225 mg, Oral, Daily  aspirin EC tablet 81 mg, 81 mg, Oral, Daily  calcium elemental (OSCAL) tablet 500 mg, 500 mg, Oral, Daily  sodium chloride flush 0.9 % injection 10 mL, 10 mL, Intravenous, 2 times per day  sodium chloride flush 0.9 % injection 10 mL, 10 mL, Intravenous, PRN  acetaminophen (TYLENOL) tablet 650 mg, 650 mg, Oral, Q6H PRN **OR** acetaminophen (TYLENOL) suppository 650 mg, 650 mg, Rectal, Q6H PRN  magnesium hydroxide (MILK OF MAGNESIA) 400 MG/5ML suspension 30 mL, 30 mL, Oral, Daily PRN  promethazine (PHENERGAN) tablet 12.5 mg, 12.5 mg, Oral, Q6H PRN **OR** ondansetron (ZOFRAN) injection 4 mg, 4 mg, Intravenous, Q6H PRN  0.9 % sodium chloride bolus, 500 mL, Intravenous, PRN  potassium chloride (KLOR-CON M) extended release tablet 40 mEq, 40 mEq, Oral, PRN **OR** potassium bicarb-citric acid (EFFER-K) effervescent tablet 40 mEq, 40 mEq, Oral, PRN **OR** potassium chloride 10 mEq/100 mL IVPB (Peripheral Line), 10 mEq, Intravenous, PRN  labetalol (NORMODYNE;TRANDATE) injection 10 mg, 10 mg, Intravenous, Q6H PRN  albuterol sulfate  (90 Base) MCG/ACT inhaler 2 puff, 2 puff, Inhalation, 4x daily **AND** ipratropium (ATROVENT HFA) 17 MCG/ACT inhaler 2 puff, 2 puff, Inhalation, 4x daily **AND** MDI Treatment, , , 4x daily         Objective:  /72   Pulse 71   Temp 97.4 °F (36.3 °C) (Temporal)   Resp 24   Ht 5' 4\" (1.626 m)   Wt 172 lb 8 oz (78.2 kg)   SpO2 93%   BMI 29.61 kg/m²      Patient Vitals for the past 24 hrs:   BP Temp Temp src Pulse Resp SpO2 Weight   11/06/20 0839 -- -- -- -- 24 93 % --   11/06/20 0834 -- -- -- -- -- 95 % --   11/06/20 0800 127/72 97.4 °F (36.3 °C) Temporal 71 24 96 % -- 11/06/20 0700 129/71 -- -- 76 -- 95 % --   11/06/20 0600 131/69 -- -- 75 -- 93 % --   11/06/20 0500 134/69 -- -- 76 -- 91 % --   11/06/20 0400 133/76 99.1 °F (37.3 °C) Oral 79 23 90 % 172 lb 8 oz (78.2 kg)   11/06/20 0326 -- -- -- -- 24 92 % --   11/06/20 0300 135/70 -- -- 75 -- (!) 88 % --   11/06/20 0200 125/74 -- -- 72 -- 91 % --   11/06/20 0100 125/70 -- -- 70 -- 91 % --   11/06/20 0000 122/65 97.9 °F (36.6 °C) Temporal 69 (!) 36 93 % --   11/05/20 2308 -- -- -- -- 22 92 % --   11/05/20 2300 119/63 -- -- 67 -- 95 % --   11/05/20 2200 (!) 115/56 -- -- 70 -- (!) 89 % --   11/05/20 2100 111/62 98.3 °F (36.8 °C) Temporal 72 22 94 % --   11/05/20 2000 -- -- -- 72 -- 92 % --   11/05/20 1923 -- -- -- -- 24 93 % --   11/05/20 1650 -- -- -- -- 26 94 % --   11/05/20 1600 -- 98.6 °F (37 °C) Temporal 76 26 91 % --   11/05/20 1500 129/65 -- -- 72 -- 93 % --   11/05/20 1400 113/61 -- -- 68 -- 97 % --   11/05/20 1300 (!) 101/44 -- -- 72 -- 92 % --   11/05/20 1200 126/71 98.8 °F (37.1 °C) Temporal 64 22 96 % --   11/05/20 1100 128/69 -- -- 65 -- 91 % --   11/05/20 1000 116/64 -- -- 63 -- 94 % --   11/05/20 0900 102/61 -- -- 65 -- 94 % --     Patient Vitals for the past 96 hrs (Last 3 readings):   Weight   11/06/20 0400 172 lb 8 oz (78.2 kg)   11/05/20 0400 174 lb (78.9 kg)   11/04/20 0400 179 lb 14.4 oz (81.6 kg)           Intake/Output Summary (Last 24 hours) at 11/6/2020 0847  Last data filed at 11/6/2020 0600  Gross per 24 hour   Intake --   Output 1175 ml   Net -1175 ml         Physical Exam:   /72   Pulse 71   Temp 97.4 °F (36.3 °C) (Temporal)   Resp 24   Ht 5' 4\" (1.626 m)   Wt 172 lb 8 oz (78.2 kg)   SpO2 93%   BMI 29.61 kg/m²     In-person bedside physical examination deferred.    ( based on new provisions and guidance offered by [facility] on March 18, 2020 in setting of COVID 19 outbreak and in order to preserve personal protective equipment in accordance with the flexibilities announced by ORDERING SYSTEM PROVIDED HISTORY: SOB TECHNOLOGIST PROVIDED HISTORY: Reason for exam:->SOB Reason for Exam: COVID+, low O2 sats Acuity: Acute Type of Exam: Initial FINDINGS: The cardiac silhouette is mildly prominent. Multifocal ground-glass opacification, particularly in the perihilar regions and left lung base. There is no pleural fluid. There is sparing of the lung apices. Multifocal ground-glass opacification likely pneumonia including atypical viral pneumonia. Pulmonary edema is a less likely etiology for the findings. Assessment and Plan:  Principal Problem:    Acute respiratory failure due to COVID-19 [facility]) -Established problem. Uncontrolled. Remains on high flow O2/vapotherm  Plan: cont o2, cont remdesivir. If does not improve, may need intubtaion  Active Problems:    COVID-19 -Established problem. Still on high flow o2  Plan: as above, remdesivir day #6 today (course extended by ID per note 11/5)    Depression -Established problem. Stable. no issues with mood  Plan: Continue present orders/plan. Hypothyroid -Established problem. Stable. Plan: cont synthroid    Essential hypertension -Established problem. Stable.   127/72  Plan: stay on same meds    Total critical care time caring for this patient with life threatening illness, including direct patient contact, management of life support systems, review of data including imaging and labs, discussions with other team members and physicians at least 36 minutes today            Pedro Pablo Arambula  11/6/2020

## 2020-11-06 NOTE — PROGRESS NOTES
Nutrition Assessment     Type and Reason for Visit: Initial(LOS)    Nutrition Recommendations/Plan:   No nutrition rec's @ this time. Nutrition Assessment:  Pt admitted for COVID 19. Nutrition status is stable AEB PO intake at least 50% of meals, reported by RN. No wt loss identified per EMR. Will con't to monitor progress. Malnutrition Assessment:  Malnutrition Status: No malnutrition    Nutrition Related Findings: active BS; +1 pitting edema BLE      Current Nutrition Therapies:    DIET GENERAL;     Anthropometric Measures:  · Height: 5' 4\" (162.6 cm)  · Current Body Wt: 172 lb (78 kg)   · BMI: 29.5    Nutrition Diagnosis:   No nutrition diagnosis at this time     Nutrition Interventions:   Food and/or Nutrient Delivery:  Continue Current Diet  Nutrition Education/Counseling:  Education not indicated   Coordination of Nutrition Care:  Continue to monitor while inpatient    Goals:  po intake to remain at least 50% of meals       Nutrition Monitoring and Evaluation:   Behavioral-Environmental Outcomes:  None Identified   Food/Nutrient Intake Outcomes:  Food and Nutrient Intake  Physical Signs/Symptoms Outcomes:  None Identified     Discharge Planning:    No discharge needs at this time     Electronically signed by Lance Robles RD, LD on 11/6/20 at 12:40 PM EST    Contact: [phone]

## 2020-11-06 NOTE — PROGRESS NOTES
[facility]    Follow Up Note    Subjective:   CHIEF COMPLAINT / HPI:   Chief Complaint   Patient presents with    Shortness of Breath     Pt brought by [facility] from home with CC of SOB that started Tuesday. Pt reports being dx'ed with COVID on Tuesday reports recent admission at [facility]. pt on RA 88%, 94%on 2L. Interval history: No significant improvement in acute hypoxemic respiratory failure. Continues to require heated high flow oxygen with FiO2 100% and 60 L/min flow. Chest x-ray yesterday unchanged compared to previous. Past Medical History:    Reviewed; no changes    Social History:    Reviewed; no changes    REVIEW OF SYSTEMS:    CONSTITUTIONAL:  negative for fevers and chills  RESPIRATORY:  See HPI  CARDIOVASCULAR:  negative for chest pain, palpitations, edema  GASTROINTESTINAL:  negative for nausea, vomiting, diarrhea, constipation and abdominal pain    Objective:   PHYSICAL EXAM:        VITALS:  /72   Pulse 71   Temp 97.4 °F (36.3 °C) (Temporal)   Resp 24   Ht 5' 4\" (1.626 m)   Wt 172 lb 8 oz (78.2 kg)   SpO2 96%   BMI 29.61 kg/m²  on heated high flow 100% and 60 L/min     24HR INTAKE/OUTPUT:      Intake/Output Summary (Last 24 hours) at 11/6/2020 0837  Last data filed at 11/6/2020 0600  Gross per 24 hour   Intake --   Output 1175 ml   Net -1175 ml       PHYSICAL EXAM:     In-person bedside physical examination deferred. Pursuant to the emergency declaration under the [address] [place] [address], [address] [place] [address] and the [employer] Act, this clinical encounter was conducted to provide necessary medical care.    (Also consistent with new provisions and guidance offered by [facility] on March 18, 2020 in setting of COVID 19 outbreak and in order to preserve personal protective equipment in accordance with the flexibilities announced by CMS on March 30, 2020) References: https://[facility]. [facility]/Portals/0/Resources/COVID-19/3_18%20Telemed%20Guidance%20Updated%20March%2018. pdf?fpx=[email or web address]                      https://[facility]/Portals/0/Resources/COVID-19/3_18%20Telemed%20Guidance%20Updated%20March%2018. pdf?wdb=[email or web address]                      [email or web address] pdf                            General: Awake and oriented to time place and person according to primary service, vitals reviewed  HEENT: Deferred  Cardiovascular: Telemetry data reviewed, rest deferred   Pulmonary: deferred  Abdomen/GI: deferred  Neuro: deferred  Skin: deferred  Musculoskeletal:  deferred  Genitourinary: Deferred  Psych: deferred  Lymphatic/Immunologic: deferred      DATA:    CBC:  Recent Labs     11/04/20 0431 11/05/20 0417 11/06/20 0413   WBC 8.0 9.0 10.1   RBC 4.36 4.48 4.56   HGB 13.0 13.8 13.7   HCT 39.5 40.6 40.6    279 315   MCV 90.6 90.6 89.2   MCH 29.8 30.8 30.0   MCHC 32.9 34.0 33.7   RDW 13.9 13.4 13.5      BMP:  Recent Labs     11/04/20 0431 11/05/20 0417 11/06/20 0413    137 135*   K 3.8 3.7 3.6    104 101   CO2 23 25 23   BUN 19 16 16   CREATININE <0.5* <0.5* <0.5*   CALCIUM 8.5 8.5 8.0*   GLUCOSE 128* 122* 90      ABG:  No results for input(s): PHART, DSX8PDH, PO2ART, HBG5MNZ, C7UVTXSE, BEART in the last 72 hours. Cultures:    Abx:    Radiology Review:  Pertinent images / reports were reviewed as a part of this visit. Assessment:     1. Acute hypoxemic respiratory failure  2. SARS-CoV-2 pneumonia  3. ARDS    I have reviewed laboratories, medical records and images for this visit  Chest imaging reveals severe diffuse airspace disease similar to previous  SARS-CoV-2 positive  Now requiring heated high flow oxygen 100% FiO2 and 60 L/min flow. ID following. The patient has received convalescent plasma. On day #6 of remdesivir. Also receiving Decadron 20 mg daily.   Oxygen saturations are holding in the low 90s.   Continue supportive care

## 2020-11-06 NOTE — PROGRESS NOTES
Pt assessment complete, see flowsheets. Medications given, see MAR. Pt NSR on monitor. Pt alert and oriented x4 and following commands, pt anxious at times. Pt on heated high flow- 60L 100% FiO2. Pt in isolation for COVID 19 positive result and Cdiff r/o. Pt on general diet. Pt ibarra intact and draining, see flowsheets. Pt has R wrist peripheral IV access, see flowsheets. Pt has scattered bruising, see flowsheets. Pt able to turn self and repositioned for comfort. Bed in lowest position and alarm on. Call light within reach. Will continue to monitor. Pt requesting PRN Tums, given @2108, see MAR.

## 2020-11-06 NOTE — PLAN OF CARE
Problem: Falls - Risk of:  Goal: Will remain free from falls  Description: Will remain free from falls  Outcome: Ongoing   All fall risk precautions in place. Bed in lowest position and alarm on. Call light within reach. Problem: Skin Integrity:  Goal: Will show no infection signs and symptoms  Description: Will show no infection signs and symptoms  Outcome: Ongoing   Pt has q2 repositioning with pillow support. Pt able to reposition self in bed.

## 2020-11-06 NOTE — PROGRESS NOTES
Reassessment complete, see flowsheets. Pt NSR on monitor. Pt oral temp 99.1-room cooled/blanket off. Pt orientation remains the same from previous assessment. Pt remains on heated high flow-60L 100% FiO2. Pt ibarra intact and draining, see flowsheets. Pt repositioned in bed. Bed in lowest position and alarm on. Call light within reach. Will continue to monitor.

## 2020-11-06 NOTE — PROGRESS NOTES
Infectious Diseases   Progress Note      Admission Date: 10/31/2020  Hospital Day: Hospital Day: 7   Attending: Huseyin Laws MD  Date of service: 11/6/2020     Chief complaint/ Reason for consult:     · Acute respiratory failure with hypoxia  · COVID-19 pneumonia  · Elevated D-dimer  · Elevated CRP in setting of COVID-19  · Leukopenia in setting of COVID-19    Microbiology:        I have reviewed allavailable micro lab data and cultures    Blood culture (2/2) - collected on 10/31/2020: Negative so far    Antibiotics and immunizations:       Current antibiotics: All antibiotics and their doses were reviewed by me    Recent Abx Admin                   remdesivir 100 mg in sodium chloride 0.9 % 250 mL IVPB (mg) 100 mg New Bag 11/06/20 1034                  Immunization History: All immunization history was reviewed by me today. There is no immunization history on file for this patient. Known drug allergies: All allergies were reviewed and updated    No Known Allergies    Social history:     Social History:  All social andepidemiologic history was reviewed and updated by me today as needed. · Tobacco use:   reports that she has quit smoking. She has never used smokeless tobacco.  · Alcohol use:   reports current alcohol use. · Currently lives in: [address] [place] [address] Dr Carrasco  has no history on file for drug. Assessment:     The patient is a 67 y.o. old female who  has a past medical history of Back pain, Cancer ([address] Essential hypertension, Headache, Hypothyroidism, Migraines, Thyroid disease, and UTI (lower urinary tract infection).  with following problems:    · Acute respiratory failure with hypoxia-remains on 60 L oxygen via high flow nasal cannula, today is day 6 of remdesivir  · COVID-19 pneumonia-ongoing  · Elevated D-dimer-monitor trends  · Elevated CRP in setting of COVID-19-monitor trends  · Leukopenia in setting of COVID-19  · Essential hypertension-BP controlled  · Hypothyroidism  · Ex-smoker  · Overweight* due to excess calorie intake : Body mass index is 29.61 kg/m². Discussion:      The patient is on IV remdesivir and IV Decadron. Today is day 6 of IV remdesivir. She remains on 60 L oxygen via heated high flow nasal cannula. Serum creatinine 0.6    White cell count is 10,100     I had extended her remdesivir course for 3 days yesterday          Plan:     Diagnostic Workup:    · Continue to monitor LDH, procalcitonin, D-dimer, CRP every other day  · Continue to follow  fever curve, WBC count and blood cultures  · Follow up on blood counts, liver and renal function    Antimicrobials:    · Will continue IV remdesivir 100 mg every 24 hour. We will keep her on remdesivir at least through the weekend  · Continue to monitor his vitals closely  · Continue IV Decadron 20 mg daily  · Maintain good glycemic control while on remdesivir and Decadron  · Continue to watch for new fever  · Continue high flow oxygen support to maintain oxygen saturation above 92%  · Aspiration precautions  · Cough and deep breathing exercises  · Anticoagulation per primary and pulmonary  · Fall precautions  · Remains at high risk of complications  · Continue close monitoring in COVID-19 unit      Drug Monitoring:    · Continue monitoring for antibiotic toxicity as follows: CBC, CMP  · Continue to watch for following: new or worsening fever, new hypotension, hives, lip swelling and redness or purulence at vascular access sites. I/v access Management:    · Continue to monitor i.v access sites for erythema, induration, discharge or tenderness. · As always, continue efforts to minimize tubes/lines/drains as clinically appropriate to reduce chances of line associated infections. Level of complexity of visit: High     Risk of Complications/Morbidity: High     · Illness(es)/ Infection present that pose threat to life/bodily function.    · There is potential for severe was conducted to provide necessary medical care. (Also consistent with new provisions and guidance offered by [facility] on March 18, 2020 in setting of COVID 19 outbreak and in order to preserve personal protective equipment in accordance with the flexibilities announced by CMS on March 30, 2020)   References: https://[facility]/Portals/0/Resources/COVID-19/3_18%20Telemed%20Guidance%20Updated%20March%2018. pdf?vkx=[email or web address]                      https://[facility]. [facility]/Portals/0/Resources/COVID-19/3_18%20Telemed%20Guidance%20Updated%20March%2018. pdf?yht=[email or web address]                      http://[employer]/. pdf                            General: Awake and oriented to time place and person according to primary service, vitals reviewed  HEENT: Deferred  Cardiovascular: Telemetry data reviewed, rest deferred   Pulmonary: deferred  Abdomen/GI: deferred  Neuro: deferred  Skin: deferred  Musculoskeletal:  deferred  Genitourinary: Deferred  Psych: deferred  Lymphatic/Immunologic: deferred    Intake and output:    I/O last 3 completed shifts:  In: -   Out: 1175 [Urine:1175]    Lab Data:   All available labs and old records have been reviewed by me.     CBC:  Recent Labs     11/04/20 0431 11/05/20 0417 11/06/20 0413   WBC 8.0 9.0 10.1   RBC 4.36 4.48 4.56   HGB 13.0 13.8 13.7   HCT 39.5 40.6 40.6    279 315   MCV 90.6 90.6 89.2   MCH 29.8 30.8 30.0   MCHC 32.9 34.0 33.7   RDW 13.9 13.4 13.5        BMP:  Recent Labs     11/04/20 0431 11/05/20 0417 11/06/20 0413    137 135*   K 3.8 3.7 3.6    104 101   CO2 23 25 23   BUN 19 16 16   CREATININE <0.5* <0.5* <0.5*   CALCIUM 8.5 8.5 8.0*   GLUCOSE 128* 122* 90        Hepatic Function Panel:   Lab Results   Component Value Date    ALKPHOS 70 11/05/2020    ALT 35 11/05/2020    AST 19 11/05/2020    PROT 5.7 11/05/2020    BILITOT 0.5 11/05/2020    LABALBU 2.6 11/05/2020 CPK: No results found for: CKTOTAL  ESR: No results found for: SEDRATE  CRP:   Lab Results   Component Value Date    .1 (H) 11/01/2020           Imaging: All pertinent images and reports for the current visit were reviewed by me during this visit. XR CHEST PORTABLE   Final Result   Unchanged chest over the past 3 days with diffuse pulmonary disease, favored   to be related to pneumonia         XR CHEST PORTABLE   Final Result   Multifocal airspace opacities, slightly progressed on the right. XR CHEST PORTABLE   Final Result   No acute interval change. Bilateral airspace disease is similar to prior   exam.         XR CHEST PORTABLE   Final Result   Multifocal ground-glass opacification likely pneumonia including atypical   viral pneumonia. Pulmonary edema is a less likely etiology for the findings. Medications: All current and past medications were reviewed.      remdesivir IVPB  100 mg Intravenous Q24H    dexamethasone  20 mg Intravenous Daily    enoxaparin  30 mg Subcutaneous BID    levothyroxine  112 mcg Oral Daily    venlafaxine  225 mg Oral Daily    aspirin  81 mg Oral Daily    calcium elemental  500 mg Oral Daily    sodium chloride flush  10 mL Intravenous 2 times per day    albuterol sulfate HFA  2 puff Inhalation 4x daily    And    ipratropium  2 puff Inhalation 4x daily           calcium carbonate, lip balm petroleum free, sodium chloride, albuterol sulfate HFA, ALPRAZolam, sodium chloride flush, acetaminophen **OR** acetaminophen, magnesium hydroxide, promethazine **OR** ondansetron, sodium chloride, potassium chloride **OR** potassium alternative oral replacement **OR** potassium chloride, labetalol      Problem list:       Patient Active Problem List   Diagnosis Code    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 Although every effort was made to ensure the accuracy of this automated transcription, some errors in transcription may have occurred inadvertently. If you may need any clarification, please do not hesitate to contact me through EPIC or at the phone number provided below with my electronic signature. Any pictures or media included in this note were obtained after taking informed verbal consent from the patient and with their approval to include those in the patient's medical record.     Argenis Ayers MD, MPH  11/6/2020 , 1:42 PM   [facility]   [address] [place] [address], [address]  Office: [phone]  Fax: [phone]  Clinic days:  Tuesday & Thursday

## 2020-11-06 NOTE — PROGRESS NOTES
Shift assessment complete, see flowsheet. VSS, pt is on AirVo 60 L 100% FiO2. Dyspnea with exertion, but recovering quickly. Tachypneic. R wrist PIV normal saline locked. Rankin patent with nile urine. In NSR. Abdomen soft and rounded with active bowel sounds in all four quadrants. Skin is pale, warm, dry, and intact. +1 pitting edema in BLE. Scattered bruising with great amounts of moles. Pt is able to turn self in bed. No signs of distress, though pt does appear anxious. [place] updated, POC discussed. Bed in lowest position with wheels locked and alarm on.  No further needs at this time, will continue to monitor.

## 2020-11-07 ENCOUNTER — APPOINTMENT (OUTPATIENT)
Dept: GENERAL RADIOLOGY | Age: 80
DRG: 871 | End: 2020-11-07
Payer: MEDICARE

## 2020-11-07 PROCEDURE — 71045 X-RAY EXAM CHEST 1 VIEW: CPT

## 2020-11-07 PROCEDURE — 2580000003 HC RX 258: Performed by: INTERNAL MEDICINE

## 2020-11-07 PROCEDURE — 94640 AIRWAY INHALATION TREATMENT: CPT

## 2020-11-07 PROCEDURE — 94761 N-INVAS EAR/PLS OXIMETRY MLT: CPT

## 2020-11-07 PROCEDURE — 2000000000 HC ICU R&B

## 2020-11-07 PROCEDURE — 6370000000 HC RX 637 (ALT 250 FOR IP): Performed by: INTERNAL MEDICINE

## 2020-11-07 PROCEDURE — 2500000003 HC RX 250 WO HCPCS: Performed by: INTERNAL MEDICINE

## 2020-11-07 PROCEDURE — 6360000002 HC RX W HCPCS: Performed by: INTERNAL MEDICINE

## 2020-11-07 PROCEDURE — C1751 CATH, INF, PER/CENT/MIDLINE: HCPCS

## 2020-11-07 PROCEDURE — 99233 SBSQ HOSP IP/OBS HIGH 50: CPT | Performed by: INTERNAL MEDICINE

## 2020-11-07 PROCEDURE — 36569 INSJ PICC 5 YR+ W/O IMAGING: CPT

## 2020-11-07 PROCEDURE — 2700000000 HC OXYGEN THERAPY PER DAY

## 2020-11-07 RX ORDER — SODIUM CHLORIDE 0.9 % (FLUSH) 0.9 %
10 SYRINGE (ML) INJECTION PRN
Status: DISCONTINUED | OUTPATIENT
Start: 2020-11-07 | End: 2020-11-13 | Stop reason: HOSPADM

## 2020-11-07 RX ORDER — SODIUM CHLORIDE 0.9 % (FLUSH) 0.9 %
10 SYRINGE (ML) INJECTION EVERY 12 HOURS SCHEDULED
Status: DISCONTINUED | OUTPATIENT
Start: 2020-11-07 | End: 2020-11-13 | Stop reason: HOSPADM

## 2020-11-07 RX ORDER — LIDOCAINE HYDROCHLORIDE 10 MG/ML
5 INJECTION, SOLUTION EPIDURAL; INFILTRATION; INTRACAUDAL; PERINEURAL ONCE
Status: DISCONTINUED | OUTPATIENT
Start: 2020-11-07 | End: 2020-11-13 | Stop reason: HOSPADM

## 2020-11-07 RX ADMIN — ENOXAPARIN SODIUM 30 MG: 30 INJECTION SUBCUTANEOUS at 19:46

## 2020-11-07 RX ADMIN — DEXAMETHASONE SODIUM PHOSPHATE 20 MG: 4 INJECTION, SOLUTION INTRA-ARTICULAR; INTRALESIONAL; INTRAMUSCULAR; INTRAVENOUS; SOFT TISSUE at 14:57

## 2020-11-07 RX ADMIN — Medication 10 ML: at 19:46

## 2020-11-07 RX ADMIN — IPRATROPIUM BROMIDE 2 PUFF: 17 AEROSOL, METERED RESPIRATORY (INHALATION) at 08:30

## 2020-11-07 RX ADMIN — REMDESIVIR 100 MG: 100 INJECTION, POWDER, LYOPHILIZED, FOR SOLUTION INTRAVENOUS at 15:20

## 2020-11-07 RX ADMIN — Medication 10 ML: at 19:47

## 2020-11-07 RX ADMIN — ENOXAPARIN SODIUM 30 MG: 30 INJECTION SUBCUTANEOUS at 08:06

## 2020-11-07 RX ADMIN — ALBUTEROL SULFATE 2 PUFF: 90 AEROSOL, METERED RESPIRATORY (INHALATION) at 16:15

## 2020-11-07 RX ADMIN — ASPIRIN 81 MG: 81 TABLET, COATED ORAL at 08:06

## 2020-11-07 RX ADMIN — IPRATROPIUM BROMIDE 2 PUFF: 17 AEROSOL, METERED RESPIRATORY (INHALATION) at 21:05

## 2020-11-07 RX ADMIN — Medication 500 MG: at 08:05

## 2020-11-07 RX ADMIN — IPRATROPIUM BROMIDE 2 PUFF: 17 AEROSOL, METERED RESPIRATORY (INHALATION) at 16:15

## 2020-11-07 RX ADMIN — LEVOTHYROXINE SODIUM 112 MCG: 0.11 TABLET ORAL at 06:35

## 2020-11-07 RX ADMIN — ALBUTEROL SULFATE 2 PUFF: 90 AEROSOL, METERED RESPIRATORY (INHALATION) at 08:30

## 2020-11-07 RX ADMIN — ALBUTEROL SULFATE 2 PUFF: 90 AEROSOL, METERED RESPIRATORY (INHALATION) at 21:05

## 2020-11-07 RX ADMIN — VENLAFAXINE HYDROCHLORIDE 225 MG: 150 CAPSULE, EXTENDED RELEASE ORAL at 08:06

## 2020-11-07 ASSESSMENT — PAIN SCALES - GENERAL
PAINLEVEL_OUTOF10: 0

## 2020-11-07 ASSESSMENT — PAIN SCALES - WONG BAKER
WONGBAKER_NUMERICALRESPONSE: 0

## 2020-11-07 NOTE — PROGRESS NOTES
Reassessment complete. VSS. No acute changes noted to previous assessment (see doc flowsheets). Pt stated desires to sleep as much as she can tonight. Current Airvo settings 100% FiO2 at 55L/min; pt maintaining sats in mid-to-lower 90s. Will continue to monitor and assess.      Natalie Mcallister, [place], RN

## 2020-11-07 NOTE — FLOWSHEET NOTE
PICC line education:    -Risks  -Benefits  -Alternatives  -Procedure    Discussed the above with patient, verbalized understanding, answered all questions. Provided with information on PICC care to review. PICC tip verified via Chest X-ray (Do not use - waiting for official CXR reading). Reported off to patient's  Nurse Rosa Elena Perry RN. Addendum: PICC tip @ [facility] - ok to use.

## 2020-11-07 NOTE — PROGRESS NOTES
Shift assessment complete. Pt is A&Ox4, following commands. Patient returned to bed from chair; tolerated will. Lung sounds noted to be diminished bilaterally. Pt on heated high flow oxygen, FiO2 100% at rate of 60L/min. Pt is NSR per monitor. No adventitious heart sounds noted. Bowel sounds auscultated. Radial and pedal pulses palpated. Rankin patent. IV  L forearm patent and flushing well with no blood return. Pt has scattered bruising, see flowsheet. Pt in isolated for + COVID-19 positive test result. Pt able to turn self; requires assistance with major turns. Educated pt on necessity of position change to prevent skin breakdown. Pillow support provided. Call light and bedside table within reach. Pt states no further needs at this time. Will continue to monitor and assess.      Jody Davis, [place], RN

## 2020-11-07 NOTE — PROGRESS NOTES
[facility]    Follow Up Note    Subjective:   CHIEF COMPLAINT / HPI:   Chief Complaint   Patient presents with    Shortness of Breath     Pt brought by [facility] from home with CC of SOB that started Tuesday. Pt reports being dx'ed with COVID on Tuesday reports recent admission at [facility]. pt on RA 88%, 94%on 2L. Interval history: No significant improvement in acute hypoxemic respiratory failure. Continues to require heated high flow oxygen with FiO2 100% and 60 L/min flow. Chest x-ray yesterday unchanged compared to previous. Past Medical History:    Reviewed; no changes    Social History:    Reviewed; no changes    REVIEW OF SYSTEMS:    CONSTITUTIONAL:  negative for fevers and chills  RESPIRATORY:  See HPI  CARDIOVASCULAR:  negative for chest pain, palpitations, edema  GASTROINTESTINAL:  negative for nausea, vomiting, diarrhea, constipation and abdominal pain    Objective:   PHYSICAL EXAM:        VITALS:  /69   Pulse 65   Temp 98 °F (36.7 °C) (Temporal)   Resp 26   Ht 5' 4\" (1.626 m)   Wt 173 lb 3.2 oz (78.6 kg)   SpO2 94%   BMI 29.73 kg/m²  on heated high flow 100% and 60 L/min     24HR INTAKE/OUTPUT:      Intake/Output Summary (Last 24 hours) at 11/7/2020 0828  Last data filed at 11/7/2020 0601  Gross per 24 hour   Intake 580 ml   Output 1225 ml   Net -645 ml       PHYSICAL EXAM:     In-person bedside physical examination deferred. Pursuant to the emergency declaration under the [address] [place] [address], [address] [place] [address] and the [employer] Act, this clinical encounter was conducted to provide necessary medical care.    (Also consistent with new provisions and guidance offered by [facility] on March 18, 2020 in setting of COVID 19 outbreak and in order to preserve personal protective equipment in accordance with the flexibilities announced by CMS on March 30, 2020) 90s.  Continue supportive care

## 2020-11-07 NOTE — PROGRESS NOTES
Hospitalist Progress Note      PCP: Bruna Norris MD    Date of Admission: 10/31/2020    Chief Complaint:   [facility] Course:   67 y. o. 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  Patient says she initially started getting sick on 10/11/2020 with having progressive shortness of air.  She was admitted to [facility] for 3 days was discharged out without any medications.  Patient says she has been attempting to manage her symptoms at home without any relief.  Said that she did have a fever of 101 this morning and has not taken any antipyretics 4 hours prior to arrival. BERNADETTE GUERRERO [facility] daughter did note that she sat up for approximately 45 minutes yesterday and became very fatigued and had to lay down.  She has had decreased activity due to her dyspnea on exertion.  Says she has had decreased p.o. intake doing to feeling very fatigued and short of air. denies having home O2.  She is currently on 4L 02 in ER here. Will need admission for acute resp fialure         Subjective:   She is haging in there  She is SOB but tolerating this. Continues to require heated high flow oxygen with FiO2 100% and 60 L/min flow.       Medications:  Reviewed    Infusion Medications   Scheduled Medications    lidocaine 1 % injection  5 mL Intradermal Once    sodium chloride flush  10 mL Intravenous 2 times per day    remdesivir IVPB  100 mg Intravenous Q24H    dexamethasone  20 mg Intravenous Daily    enoxaparin  30 mg Subcutaneous BID    levothyroxine  112 mcg Oral Daily    venlafaxine  225 mg Oral Daily    aspirin  81 mg Oral Daily    calcium elemental  500 mg Oral Daily    sodium chloride flush  10 mL Intravenous 2 times per day    albuterol sulfate HFA  2 puff Inhalation 4x daily    And    ipratropium  2 puff Inhalation 4x daily PRN Meds: sodium chloride flush, calcium carbonate, lip balm petroleum free, sodium chloride, albuterol sulfate HFA, ALPRAZolam, sodium chloride flush, acetaminophen **OR** acetaminophen, magnesium hydroxide, promethazine **OR** ondansetron, sodium chloride, potassium chloride **OR** potassium alternative oral replacement **OR** potassium chloride, labetalol      Intake/Output Summary (Last 24 hours) at 11/7/2020 1240  Last data filed at 11/7/2020 0601  Gross per 24 hour   Intake 580 ml   Output 1225 ml   Net -645 ml       Physical Exam Performed:    /70   Pulse 60   Temp 98 °F (36.7 °C) (Temporal)   Resp 26   Ht 5' 4\" (1.626 m)   Wt 173 lb 3.2 oz (78.6 kg)   SpO2 100%   BMI 29.73 kg/m²     General appearance: Appears ill but is not in respiratory distress  HEENT: Pupils equal, round, and reactive to light. Conjunctivae/corneas clear. Neck: Supple, with full range of motion. No jugular venous distention. Trachea midline. Respiratory:  Normal respiratory effort. Rales at the bases bilat. Cardiovascular: Regular rate and rhythm with normal S1/S2 without murmurs, rubs or gallops. Abdomen: Soft, non-tender, non-distended with normal bowel sounds. Musculoskeletal: No clubbing, cyanosis or edema bilaterally. Skin: Skin color, texture, turgor normal.  No rashes or lesions. Neurologic:  Neurovascularly intact without any focal sensory/motor deficits.  Cranial nerves: II-XII intact, grossly non-focal.  Psychiatric: Alert and oriented, thought content appropriate, normal insight  Capillary Refill: Brisk,< 3 seconds   Peripheral Pulses: +2 palpable, equal bilaterally       Labs:   Recent Labs     11/05/20 0417 11/06/20 0413   WBC 9.0 10.1   HGB 13.8 13.7   HCT 40.6 40.6    315     Recent Labs     11/05/20 0417 11/06/20 0413    135*   K 3.7 3.6    101   CO2 25 23   BUN 16 16   CREATININE <0.5* <0.5*   CALCIUM 8.5 8.0*     Recent Labs     11/05/20 0417   AST 19   ALT 35   BILITOT 0. 5   ALKPHOS 70     No results for input(s): INR in the last 72 hours. No results for input(s): Brigitte Charito in the last 72 hours. Urinalysis:      Lab Results   Component Value Date    NITRU Negative 10/31/2020    WBCUA 2 10/31/2020    RBCUA 2 10/31/2020    BLOODU Negative 10/31/2020    SPECGRAV 1.013 10/31/2020    GLUCOSEU Negative 10/31/2020       Radiology:  XR CHEST PORTABLE   Final Result   Unchanged chest over the past 3 days with diffuse pulmonary disease, favored   to be related to pneumonia         XR CHEST PORTABLE   Final Result   Multifocal airspace opacities, slightly progressed on the right. XR CHEST PORTABLE   Final Result   No acute interval change. Bilateral airspace disease is similar to prior   exam.         XR CHEST PORTABLE   Final Result   Multifocal ground-glass opacification likely pneumonia including atypical   viral pneumonia. Pulmonary edema is a less likely etiology for the findings. Assessment/Plan:    Active Hospital Problems    Diagnosis    Acute respiratory failure due to COVID-19 (HCC) [U07.1, J96.00]    Fever [R50.9]    Elevated d-dimer [R79.89]    Elevated C-reactive protein (CRP) [R79.82]    Essential hypertension [I10]    Ex-smoker [Z87.891]    Overweight [E66.3]    COVID-19 [U07.1]    COVID-19 virus infection [U07.1]    Depression [F32.9]    Hypothyroid [E03.9]       Acute respiratory failure with hypoxia  - 2ndary to COVID-19  - Continues to require heated high flow FiO2 100% and 60 L/min flow.  -she continues on Remdesivir ( DAY &) She is in Decadron 20 mg daily  - she has completed convalescent plasma.   - elevated Crp  -elevated d-dimer    Hypothyroid  - resume home meds    HTN  - home meds    DVT Prophylaxis: lovenox  Diet: DIET GENERAL;  Code Status: Full Code    PT/OT Eval Status: when appropriate    Dispo - Continue ICU level of care     32 min critical care time spent     Madan Zimmerman MD

## 2020-11-08 LAB
ANION GAP SERPL CALCULATED.3IONS-SCNC: 7 MMOL/L (ref 3–16)
APTT: 27.5 SEC (ref 24.2–36.2)
BASOPHILS ABSOLUTE: 0 K/UL (ref 0–0.2)
BASOPHILS RELATIVE PERCENT: 0.1 %
BUN BLDV-MCNC: 22 MG/DL (ref 7–20)
CALCIUM SERPL-MCNC: 8.3 MG/DL (ref 8.3–10.6)
CHLORIDE BLD-SCNC: 105 MMOL/L (ref 99–110)
CO2: 27 MMOL/L (ref 21–32)
CREAT SERPL-MCNC: <0.5 MG/DL (ref 0.6–1.2)
D DIMER: 2034 NG/ML DDU (ref 0–229)
EOSINOPHILS ABSOLUTE: 0.1 K/UL (ref 0–0.6)
EOSINOPHILS RELATIVE PERCENT: 0.9 %
FIBRINOGEN: 515 MG/DL (ref 200–397)
GFR AFRICAN AMERICAN: >60
GFR NON-AFRICAN AMERICAN: >60
GLUCOSE BLD-MCNC: 99 MG/DL (ref 70–99)
HCT VFR BLD CALC: 37.6 % (ref 36–48)
HEMOGLOBIN: 12.5 G/DL (ref 12–16)
LYMPHOCYTES ABSOLUTE: 0.7 K/UL (ref 1–5.1)
LYMPHOCYTES RELATIVE PERCENT: 6 %
MAGNESIUM: 2.1 MG/DL (ref 1.8–2.4)
MCH RBC QN AUTO: 29.7 PG (ref 26–34)
MCHC RBC AUTO-ENTMCNC: 33.2 G/DL (ref 31–36)
MCV RBC AUTO: 89.5 FL (ref 80–100)
MONOCYTES ABSOLUTE: 0.4 K/UL (ref 0–1.3)
MONOCYTES RELATIVE PERCENT: 3.2 %
NEUTROPHILS ABSOLUTE: 10.1 K/UL (ref 1.7–7.7)
NEUTROPHILS RELATIVE PERCENT: 89.8 %
PDW BLD-RTO: 13.8 % (ref 12.4–15.4)
PHOSPHORUS: 3.6 MG/DL (ref 2.5–4.9)
PLATELET # BLD: 298 K/UL (ref 135–450)
PMV BLD AUTO: 8.1 FL (ref 5–10.5)
POTASSIUM REFLEX MAGNESIUM: 3.9 MMOL/L (ref 3.5–5.1)
RBC # BLD: 4.2 M/UL (ref 4–5.2)
SODIUM BLD-SCNC: 139 MMOL/L (ref 136–145)
WBC # BLD: 11.3 K/UL (ref 4–11)

## 2020-11-08 PROCEDURE — 6360000002 HC RX W HCPCS: Performed by: INTERNAL MEDICINE

## 2020-11-08 PROCEDURE — 2700000000 HC OXYGEN THERAPY PER DAY

## 2020-11-08 PROCEDURE — 6370000000 HC RX 637 (ALT 250 FOR IP): Performed by: INTERNAL MEDICINE

## 2020-11-08 PROCEDURE — 99233 SBSQ HOSP IP/OBS HIGH 50: CPT | Performed by: INTERNAL MEDICINE

## 2020-11-08 PROCEDURE — 80048 BASIC METABOLIC PNL TOTAL CA: CPT

## 2020-11-08 PROCEDURE — 85730 THROMBOPLASTIN TIME PARTIAL: CPT

## 2020-11-08 PROCEDURE — 94640 AIRWAY INHALATION TREATMENT: CPT

## 2020-11-08 PROCEDURE — 2580000003 HC RX 258: Performed by: INTERNAL MEDICINE

## 2020-11-08 PROCEDURE — 85379 FIBRIN DEGRADATION QUANT: CPT

## 2020-11-08 PROCEDURE — 36592 COLLECT BLOOD FROM PICC: CPT

## 2020-11-08 PROCEDURE — 36415 COLL VENOUS BLD VENIPUNCTURE: CPT

## 2020-11-08 PROCEDURE — 2500000003 HC RX 250 WO HCPCS: Performed by: INTERNAL MEDICINE

## 2020-11-08 PROCEDURE — 2000000000 HC ICU R&B

## 2020-11-08 PROCEDURE — 85025 COMPLETE CBC W/AUTO DIFF WBC: CPT

## 2020-11-08 PROCEDURE — 84100 ASSAY OF PHOSPHORUS: CPT

## 2020-11-08 PROCEDURE — 83735 ASSAY OF MAGNESIUM: CPT

## 2020-11-08 PROCEDURE — 85384 FIBRINOGEN ACTIVITY: CPT

## 2020-11-08 RX ADMIN — Medication 10 ML: at 09:05

## 2020-11-08 RX ADMIN — ENOXAPARIN SODIUM 80 MG: 80 INJECTION SUBCUTANEOUS at 21:32

## 2020-11-08 RX ADMIN — ENOXAPARIN SODIUM 80 MG: 80 INJECTION SUBCUTANEOUS at 09:05

## 2020-11-08 RX ADMIN — DEXAMETHASONE SODIUM PHOSPHATE 20 MG: 4 INJECTION, SOLUTION INTRA-ARTICULAR; INTRALESIONAL; INTRAMUSCULAR; INTRAVENOUS; SOFT TISSUE at 09:05

## 2020-11-08 RX ADMIN — VENLAFAXINE HYDROCHLORIDE 225 MG: 150 CAPSULE, EXTENDED RELEASE ORAL at 09:04

## 2020-11-08 RX ADMIN — IPRATROPIUM BROMIDE 2 PUFF: 17 AEROSOL, METERED RESPIRATORY (INHALATION) at 07:45

## 2020-11-08 RX ADMIN — IPRATROPIUM BROMIDE 2 PUFF: 17 AEROSOL, METERED RESPIRATORY (INHALATION) at 19:31

## 2020-11-08 RX ADMIN — ANTACID TABLETS 500 MG: 500 TABLET, CHEWABLE ORAL at 21:38

## 2020-11-08 RX ADMIN — Medication 10 ML: at 21:33

## 2020-11-08 RX ADMIN — ASPIRIN 81 MG: 81 TABLET, COATED ORAL at 09:04

## 2020-11-08 RX ADMIN — ALBUTEROL SULFATE 2 PUFF: 90 AEROSOL, METERED RESPIRATORY (INHALATION) at 19:31

## 2020-11-08 RX ADMIN — ALBUTEROL SULFATE 2 PUFF: 90 AEROSOL, METERED RESPIRATORY (INHALATION) at 07:46

## 2020-11-08 RX ADMIN — Medication 500 MG: at 09:04

## 2020-11-08 RX ADMIN — REMDESIVIR 100 MG: 100 INJECTION, POWDER, LYOPHILIZED, FOR SOLUTION INTRAVENOUS at 10:20

## 2020-11-08 RX ADMIN — ALBUTEROL SULFATE 2 PUFF: 90 AEROSOL, METERED RESPIRATORY (INHALATION) at 16:35

## 2020-11-08 RX ADMIN — IPRATROPIUM BROMIDE 2 PUFF: 17 AEROSOL, METERED RESPIRATORY (INHALATION) at 16:35

## 2020-11-08 RX ADMIN — LEVOTHYROXINE SODIUM 112 MCG: 0.11 TABLET ORAL at 06:34

## 2020-11-08 ASSESSMENT — PAIN SCALES - GENERAL
PAINLEVEL_OUTOF10: 0

## 2020-11-08 NOTE — PROGRESS NOTES
Reassessment complete. See flowsheets for complete details. VSS. No acute changes noted. NSR on the monitor. Lungs remain diminished and pt remains on AirVo with FIO2 90% and 55LO2. Pt denies pain. No needs expressed. Call light within reach.

## 2020-11-08 NOTE — PROGRESS NOTES
Hospitalist Progress Note      PCP: Bruna Norris MD    Date of Admission: 10/31/2020    Chief Complaint:   [facility] Course:   67 y. o. 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  Patient says she initially started getting sick on 10/11/2020 with having progressive shortness of air.  She was admitted to [facility] for 3 days was discharged out without any medications.  Patient says she has been attempting to manage her symptoms at home without any relief.  Said that she did have a fever of 101 this morning and has not taken any antipyretics 4 hours prior to arrival. Omar Arias daughter did note that she sat up for approximately 45 minutes yesterday and became very fatigued and had to lay down.  She has had decreased activity due to her dyspnea on exertion.  Says she has had decreased p.o. intake doing to feeling very fatigued and short of air. denies having home O2.  She is currently on 4L 02 in ER here. Will need admission for acute resp fialure         Subjective:   She is haging in there  She is SOB but tolerating this. Continues to require heated high flow with FiO2 90% and flow 55 L/min  She appears comfortable and is in no distress.       Medications:  Reviewed    Infusion Medications   Scheduled Medications    enoxaparin  1 mg/kg Subcutaneous BID    lidocaine 1 % injection  5 mL Intradermal Once    sodium chloride flush  10 mL Intravenous 2 times per day    dexamethasone  20 mg Intravenous Daily    levothyroxine  112 mcg Oral Daily    venlafaxine  225 mg Oral Daily    aspirin  81 mg Oral Daily    calcium elemental  500 mg Oral Daily    sodium chloride flush  10 mL Intravenous 2 times per day    albuterol sulfate HFA  2 puff Inhalation 4x daily    And    ipratropium  2 puff Inhalation 4x daily     PRN Meds: sodium chloride flush, calcium carbonate, lip balm petroleum free, sodium chloride, albuterol sulfate HFA, ALPRAZolam, sodium chloride flush, acetaminophen **OR** acetaminophen, magnesium hydroxide, promethazine **OR** ondansetron, sodium chloride, potassium chloride **OR** potassium alternative oral replacement **OR** potassium chloride, labetalol      Intake/Output Summary (Last 24 hours) at 11/8/2020 1403  Last data filed at 11/8/2020 1137  Gross per 24 hour   Intake 813.27 ml   Output 1075 ml   Net -261.73 ml       Physical Exam Performed:    /65   Pulse 69   Temp 98.6 °F (37 °C) (Oral)   Resp 30   Ht 5' 4\" (1.626 m)   Wt 169 lb 14.4 oz (77.1 kg)   SpO2 94%   BMI 29.16 kg/m²     General appearance: Appears ill but is not in respiratory distress  HEENT: Pupils equal, round, and reactive to light. Conjunctivae/corneas clear. Neck: Supple, with full range of motion. No jugular venous distention. Trachea midline. Respiratory:  Normal respiratory effort. Rales at the bases bilat. Cardiovascular: Regular rate and rhythm with normal S1/S2 without murmurs, rubs or gallops. Abdomen: Soft, non-tender, non-distended with normal bowel sounds. Musculoskeletal: No clubbing, cyanosis or edema bilaterally. Skin: Skin color, texture, turgor normal.  No rashes or lesions. Neurologic:  Neurovascularly intact without any focal sensory/motor deficits.  Cranial nerves: II-XII intact, grossly non-focal.  Psychiatric: Alert and oriented, thought content appropriate, normal insight  Capillary Refill: Brisk,< 3 seconds   Peripheral Pulses: +2 palpable, equal bilaterally       Labs:   Recent Labs     11/06/20  0413 11/08/20  0500   WBC 10.1 11.3*   HGB 13.7 12.5   HCT 40.6 37.6    298     Recent Labs     11/06/20  0413 11/08/20  0500   * 139   K 3.6 3.9    105   CO2 23 27   BUN 16 22*   CREATININE <0.5* <0.5*   CALCIUM 8.0* 8.3   PHOS  --  3.6     No results for input(s): AST, ALT, BILIDIR, BILITOT, ALKPHOS in the last 72 hours. No results for input(s): INR in the last 72 hours. No results for input(s): Alfredo Petersen in the last 72 hours. Urinalysis:      Lab Results   Component Value Date    NITRU Negative 10/31/2020    WBCUA 2 10/31/2020    RBCUA 2 10/31/2020    BLOODU Negative 10/31/2020    SPECGRAV 1.013 10/31/2020    GLUCOSEU Negative 10/31/2020       Radiology:  XR CHEST PORTABLE   Final Result   Right upper extremity PICC line extends to expected location of cavoatrial   junction. No significant interval change in multifocal bilateral airspace disease as   compared to prior. XR CHEST PORTABLE   Final Result   Unchanged chest over the past 3 days with diffuse pulmonary disease, favored   to be related to pneumonia         XR CHEST PORTABLE   Final Result   Multifocal airspace opacities, slightly progressed on the right. XR CHEST PORTABLE   Final Result   No acute interval change. Bilateral airspace disease is similar to prior   exam.         XR CHEST PORTABLE   Final Result   Multifocal ground-glass opacification likely pneumonia including atypical   viral pneumonia. Pulmonary edema is a less likely etiology for the findings. Assessment/Plan:    Active Hospital Problems    Diagnosis    Acute respiratory failure due to COVID-19 (HCC) [U07.1, J96.00]    Fever [R50.9]    Elevated d-dimer [R79.89]    Elevated C-reactive protein (CRP) [R79.82]    Essential hypertension [I10]    Ex-smoker [Z87.891]    Overweight [E66.3]    COVID-19 [U07.1]    COVID-19 virus infection [U07.1]    Depression [F32.9]    Hypothyroid [E03.9]       Acute respiratory failure with hypoxia  - 2ndary to COVID-19  - Continues to require heated high flow FiO2 100% and 60 L/min flow.  -she continues on Remdesivir ( DAY 8) She is in Decadron 20 mg daily  - she has completed convalescent plasma.   - elevated Crp  -elevated d-dimer- she is in full dose anticoagulation.     Hypothyroid  - resume home meds    HTN  - home meds    DVT Prophylaxis: lovenox  Diet: DIET GENERAL;  Code Status: Full Code    PT/OT Eval Status: when appropriate    Dispo - Continue ICU level of care     30 min critical care time spent     Madan Zimmerman MD

## 2020-11-08 NOTE — PLAN OF CARE
Problem: Falls - Risk of:  Goal: Will remain free from falls  Description: Will remain free from falls  Outcome: Ongoing  Goal: Absence of physical injury  Description: Absence of physical injury  Outcome: Ongoing     Problem: Airway Clearance - Ineffective  Goal: Achieve or maintain patent airway  Outcome: Ongoing     Problem: Gas Exchange - Impaired  Goal: Absence of hypoxia  Outcome: Ongoing  Goal: Promote optimal lung function  Outcome: Ongoing     Problem: Breathing Pattern - Ineffective  Goal: Ability to achieve and maintain a regular respiratory rate  Outcome: Ongoing     Problem:  Body Temperature -  Risk of, Imbalanced  Goal: Ability to maintain a body temperature within defined limits  Outcome: Ongoing  Goal: Will regain or maintain usual level of consciousness  Outcome: Ongoing  Goal: Complications related to the disease process, condition or treatment will be avoided or minimized  Outcome: Ongoing     Problem: Isolation Precautions - Risk of Spread of Infection  Goal: Prevent transmission of infection  Outcome: Ongoing     Problem: Nutrition Deficits  Goal: Optimize nutrtional status  Outcome: Ongoing     Problem: Risk for Fluid Volume Deficit  Goal: Maintain normal heart rhythm  Outcome: Ongoing  Goal: Maintain absence of muscle cramping  Outcome: Ongoing  Goal: Maintain normal serum potassium, sodium, calcium, phosphorus, and pH  Outcome: Ongoing     Problem: Loneliness or Risk for Loneliness  Goal: Demonstrate positive use of time alone when socialization is not possible  Outcome: Ongoing     Problem: Fatigue  Goal: Verbalize increase energy and improved vitality  Outcome: Ongoing     Problem: Patient Education: Go to Patient Education Activity  Goal: Patient/Family Education  Outcome: Ongoing     Problem: Pain:  Goal: Pain level will decrease  Description: Pain level will decrease  Outcome: Ongoing  Goal: Control of acute pain  Description: Control of acute pain  Outcome: Ongoing  Goal: Control of chronic pain  Description: Control of chronic pain  Outcome: Ongoing     Problem: Skin Integrity:  Goal: Will show no infection signs and symptoms  Description: Will show no infection signs and symptoms  Outcome: Ongoing  Goal: Absence of new skin breakdown  Description: Absence of new skin breakdown  Outcome: Ongoing

## 2020-11-08 NOTE — PROGRESS NOTES
Shift assessment completed. See complex assessment in doc flowsheet. Pt is A&Ox4. NSR on the monitor. Lungs diminished and pt on AirVO with FIO2 90% and 55LO2. Abd is soft/rounded with +bs x4 quadrants. Rankin is patent and draining nile urine. Skin is noted to have scattered bruising. Pt denies pain. Bed in lowest position, wheels locked, and alarm active. Updated pt on POC and all questions answered. No needs expressed. Call light within reach.

## 2020-11-08 NOTE — PROGRESS NOTES
No acute changes noted to assessment. See flowsheets for complete details. VSS. Pt denies pain. No needs expressed. Call light within reach.

## 2020-11-08 NOTE — PLAN OF CARE
Problem: Risk for Fluid Volume Deficit  Goal: Maintain normal serum potassium, sodium, calcium, phosphorus, and pH  11/8/2020 0917 by [employer]  Outcome: Ongoing     Problem: Loneliness or Risk for Loneliness  Goal: Demonstrate positive use of time alone when socialization is not possible  11/8/2020 0917 by [employer]  Outcome: Ongoing     Problem: Fatigue  Goal: Verbalize increase energy and improved vitality  11/8/2020 0917 by [employer]  Outcome: Ongoing     Problem: Patient Education: Go to Patient Education Activity  Goal: Patient/Family Education  11/8/2020 0917 by [employer]  Outcome: Ongoing     Problem: Pain:  Goal: Pain level will decrease  Description: Pain level will decrease  11/8/2020 0917 by [employer]  Outcome: Ongoing     Problem: Pain:  Goal: Control of acute pain  Description: Control of acute pain  11/8/2020 0917 by [employer]  Outcome: Ongoing     Problem: Pain:  Goal: Control of chronic pain  Description: Control of chronic pain  11/8/2020 0917 by [employer]  Outcome: Ongoing     Problem: Skin Integrity:  Goal: Will show no infection signs and symptoms  Description: Will show no infection signs and symptoms  11/8/2020 0917 by [employer]  Outcome: Ongoing     Problem: Skin Integrity:  Goal: Absence of new skin breakdown  Description: Absence of new skin breakdown  11/8/2020 0917 by [employer]  Outcome: Ongoing

## 2020-11-08 NOTE — PROGRESS NOTES
[email or web address]%[email or web address]                      [email or web address] pdf                            General: Awake and oriented to time place and person according to primary service, vitals reviewed  HEENT: Deferred  Cardiovascular: Telemetry data reviewed, rest deferred   Pulmonary: deferred  Abdomen/GI: deferred  Neuro: deferred  Skin: deferred  Musculoskeletal:  deferred  Genitourinary: Deferred  Psych: deferred  Lymphatic/Immunologic: deferred      DATA:    CBC:  Recent Labs     11/06/20 0413 11/08/20  0500   WBC 10.1 11.3*   RBC 4.56 4.20   HGB 13.7 12.5   HCT 40.6 37.6    298   MCV 89.2 89.5   MCH 30.0 29.7   MCHC 33.7 33.2   RDW 13.5 13.8      BMP:  Recent Labs     11/06/20 0413 11/08/20  0500   * 139   K 3.6 3.9    105   CO2 23 27   BUN 16 22*   CREATININE <0.5* <0.5*   CALCIUM 8.0* 8.3   GLUCOSE 90 99      ABG:  No results for input(s): PHART, UXV5BKO, PO2ART, SJD3KEN, L4MKOWCS, BEART in the last 72 hours. Cultures:    Abx:    Radiology Review:  Pertinent images / reports were reviewed as a part of this visit. Assessment:     1. Acute hypoxemic respiratory failure  2. SARS-CoV-2 pneumonia  3. ARDS    I have reviewed laboratories, medical records and images for this visit  Chest imaging from yesterday reveals continued diffuse infiltrates similar to previous  Oxygen requirement has decreased to heated high flow with FiO2 90% and flow 55 L/min  Saturations remain in the high 90s  Continue to wean oxygen  On day #8 of remdesivir. Also receiving Decadron 20 mg daily. CRP remains elevated at 167.   Continue current dose of Decadron  D-dimer is up to 2000  Increased to full dose Lovenox

## 2020-11-08 NOTE — PROGRESS NOTES
Shift assessment complete. Pt is A&Ox4. Lung sounds noted to be diminished bilaterally. Pt requiring Airvo, FiO2 90%, 55L, maintaining sats in upper 90s. Pt is NSR per monitor. No adventitious heart sounds noted. Bowel sounds auscultated. Radial and pedal pulses palpated. Rankin patent. R basilic PICC double lumen patent and flushing well with blood return noted in red lumen. Pt has scattered bruising, see flowsheet. Pt in isolation for positive COVID-19 test result. Pt can turn with assistance, pillow support provided. Pt requested not to be turned until she is ready for bed tonight. Call light and bedside table within reach. No further needs noted at this time. Will continue to monitor and assess.      Oskar Lopez, [place], RN

## 2020-11-09 LAB
C-REACTIVE PROTEIN: 94.2 MG/L (ref 0–5.1)
FERRITIN: 747.7 NG/ML (ref 15–150)
FIBRINOGEN: 590 MG/DL (ref 200–397)
LACTATE DEHYDROGENASE: 304 U/L (ref 100–190)

## 2020-11-09 PROCEDURE — 2700000000 HC OXYGEN THERAPY PER DAY

## 2020-11-09 PROCEDURE — 6370000000 HC RX 637 (ALT 250 FOR IP): Performed by: INTERNAL MEDICINE

## 2020-11-09 PROCEDURE — 94640 AIRWAY INHALATION TREATMENT: CPT

## 2020-11-09 PROCEDURE — 99233 SBSQ HOSP IP/OBS HIGH 50: CPT | Performed by: INTERNAL MEDICINE

## 2020-11-09 PROCEDURE — 6360000002 HC RX W HCPCS: Performed by: INTERNAL MEDICINE

## 2020-11-09 PROCEDURE — 94761 N-INVAS EAR/PLS OXIMETRY MLT: CPT

## 2020-11-09 PROCEDURE — 36592 COLLECT BLOOD FROM PICC: CPT

## 2020-11-09 PROCEDURE — 2580000003 HC RX 258: Performed by: INTERNAL MEDICINE

## 2020-11-09 PROCEDURE — 86140 C-REACTIVE PROTEIN: CPT

## 2020-11-09 PROCEDURE — 85384 FIBRINOGEN ACTIVITY: CPT

## 2020-11-09 PROCEDURE — 83615 LACTATE (LD) (LDH) ENZYME: CPT

## 2020-11-09 PROCEDURE — 82728 ASSAY OF FERRITIN: CPT

## 2020-11-09 PROCEDURE — 2000000000 HC ICU R&B

## 2020-11-09 PROCEDURE — 2500000003 HC RX 250 WO HCPCS: Performed by: INTERNAL MEDICINE

## 2020-11-09 RX ADMIN — ALBUTEROL SULFATE 2 PUFF: 90 AEROSOL, METERED RESPIRATORY (INHALATION) at 16:45

## 2020-11-09 RX ADMIN — ALBUTEROL SULFATE 2 PUFF: 90 AEROSOL, METERED RESPIRATORY (INHALATION) at 19:35

## 2020-11-09 RX ADMIN — Medication 10 ML: at 10:27

## 2020-11-09 RX ADMIN — Medication 10 ML: at 20:23

## 2020-11-09 RX ADMIN — IPRATROPIUM BROMIDE 2 PUFF: 17 AEROSOL, METERED RESPIRATORY (INHALATION) at 13:00

## 2020-11-09 RX ADMIN — DEXAMETHASONE SODIUM PHOSPHATE 20 MG: 4 INJECTION, SOLUTION INTRA-ARTICULAR; INTRALESIONAL; INTRAMUSCULAR; INTRAVENOUS; SOFT TISSUE at 10:29

## 2020-11-09 RX ADMIN — LEVOTHYROXINE SODIUM 112 MCG: 0.11 TABLET ORAL at 10:28

## 2020-11-09 RX ADMIN — IPRATROPIUM BROMIDE 2 PUFF: 17 AEROSOL, METERED RESPIRATORY (INHALATION) at 19:35

## 2020-11-09 RX ADMIN — ALBUTEROL SULFATE 2 PUFF: 90 AEROSOL, METERED RESPIRATORY (INHALATION) at 08:18

## 2020-11-09 RX ADMIN — REMDESIVIR 100 MG: 100 INJECTION, POWDER, LYOPHILIZED, FOR SOLUTION INTRAVENOUS at 12:25

## 2020-11-09 RX ADMIN — ENOXAPARIN SODIUM 80 MG: 80 INJECTION SUBCUTANEOUS at 10:28

## 2020-11-09 RX ADMIN — ENOXAPARIN SODIUM 80 MG: 80 INJECTION SUBCUTANEOUS at 20:22

## 2020-11-09 RX ADMIN — IPRATROPIUM BROMIDE 2 PUFF: 17 AEROSOL, METERED RESPIRATORY (INHALATION) at 08:18

## 2020-11-09 RX ADMIN — ALBUTEROL SULFATE 2 PUFF: 90 AEROSOL, METERED RESPIRATORY (INHALATION) at 13:00

## 2020-11-09 RX ADMIN — IPRATROPIUM BROMIDE 2 PUFF: 17 AEROSOL, METERED RESPIRATORY (INHALATION) at 16:46

## 2020-11-09 RX ADMIN — Medication 500 MG: at 10:29

## 2020-11-09 RX ADMIN — ASPIRIN 81 MG: 81 TABLET, COATED ORAL at 10:28

## 2020-11-09 RX ADMIN — VENLAFAXINE HYDROCHLORIDE 225 MG: 150 CAPSULE, EXTENDED RELEASE ORAL at 10:28

## 2020-11-09 ASSESSMENT — PAIN SCALES - GENERAL
PAINLEVEL_OUTOF10: 0

## 2020-11-09 NOTE — PROGRESS NOTES
Shift assessment completed. See complex assessment in doc flowsheet. Pt is A&Ox4. NSR on the monitor. Lungs diminished and pt on AirVO with FIO2 70% and 50LO2. Abd is soft/rounded with +bs x4 quadrants. Rankin is patent and draining nile urine. Skin is noted to have scattered bruising. Pt denies pain. Bed in lowest position, wheels locked, and alarm active. Updated pt on POC and all questions answered. No needs expressed. Call light within reach.

## 2020-11-09 NOTE — PROGRESS NOTES
Hospitalist Progress Note      PCP: Bruna Norris MD    Date of Admission: 10/31/2020    Chief Complaint:   [address]:   67 y. o. 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  Patient says she initially started getting sick on 10/11/2020 with having progressive shortness of air.  She was admitted to [facility] for 3 days was discharged out without any medications.  Patient says she has been attempting to manage her symptoms at home without any relief.  Said that she did have a fever of 101 this morning and has not taken any antipyretics 4 hours prior to arrival. BRENADETTE GUERRERO [facility] daughter did note that she sat up for approximately 45 minutes yesterday and became very fatigued and had to lay down.  She has had decreased activity due to her dyspnea on exertion.  Says she has had decreased p.o. intake doing to feeling very fatigued and short of air. denies having home O2.  She is currently on 4L 02 in ER here. Will need admission for acute resp fialure         Subjective:   Currently on 70% 50  L heated nasal cannula  She is SOB but tolerating this. She appears comfortable and is in no distress.   No acute event overnight      Medications:  Reviewed    Infusion Medications   Scheduled Medications    remdesivir IVPB  100 mg Intravenous Q24H    enoxaparin  1 mg/kg Subcutaneous BID    lidocaine 1 % injection  5 mL Intradermal Once    sodium chloride flush  10 mL Intravenous 2 times per day    dexamethasone  20 mg Intravenous Daily    levothyroxine  112 mcg Oral Daily    venlafaxine  225 mg Oral Daily    aspirin  81 mg Oral Daily    calcium elemental  500 mg Oral Daily    sodium chloride flush  10 mL Intravenous 2 times per day    albuterol sulfate HFA  2 puff Inhalation 4x daily    And    ipratropium  2 puff Inhalation 4x daily     PRN Meds: sodium chloride flush, calcium carbonate, lip balm petroleum free, sodium chloride, albuterol sulfate HFA, ALPRAZolam, sodium chloride flush, acetaminophen **OR** acetaminophen, magnesium hydroxide, promethazine **OR** ondansetron, sodium chloride, potassium chloride **OR** potassium alternative oral replacement **OR** potassium chloride, labetalol      Intake/Output Summary (Last 24 hours) at 11/9/2020 1513  Last data filed at 11/9/2020 1225  Gross per 24 hour   Intake 420 ml   Output 800 ml   Net -380 ml       Physical Exam Performed:    BP (!) 100/57   Pulse 66   Temp 96.5 °F (35.8 °C) (Temporal)   Resp 24   Ht 5' 4\" (1.626 m)   Wt 170 lb 11.2 oz (77.4 kg)   SpO2 95%   BMI 29.30 kg/m²     General appearance: Appears ill but is not in respiratory distress  HEENT: Pupils equal, round, and reactive to light. Conjunctivae/corneas clear. Neck: Supple, with full range of motion. No jugular venous distention. Trachea midline. Respiratory:  Normal respiratory effort. Rales at the bases bilat. Cardiovascular: Regular rate and rhythm with normal S1/S2 without murmurs, rubs or gallops. Abdomen: Soft, non-tender, non-distended with normal bowel sounds. Musculoskeletal: No clubbing, cyanosis or edema bilaterally. Skin: Skin color, texture, turgor normal.  No rashes or lesions. Neurologic:  Neurovascularly intact without any focal sensory/motor deficits. Cranial nerves: II-XII intact, grossly non-focal.  Psychiatric: Alert and oriented, thought content appropriate, normal insight  Capillary Refill: Brisk,< 3 seconds   Peripheral Pulses: +2 palpable, equal bilaterally       Labs:   Recent Labs     11/08/20  0500   WBC 11.3*   HGB 12.5   HCT 37.6        Recent Labs     11/08/20  0500      K 3.9      CO2 27   BUN 22*   CREATININE <0.5*   CALCIUM 8.3   PHOS 3.6     No results for input(s): AST, ALT, BILIDIR, BILITOT, ALKPHOS in the last 72 hours.   No results for

## 2020-11-09 NOTE — PROGRESS NOTES
[facility] Pul[facility]    Follow Up Note    Subjective:   CHIEF COMPLAINT / HPI:   Chief Complaint   Patient presents with    Shortness of Breath     Pt brought by [facility] from home with CC of SOB that started Tuesday. Pt reports being dx'ed with COVID on Tuesday reports recent admission at [facility]. pt on RA 88%, 94%on 2L. Interval history: Oxygen requirements continue to slowly decrease. Appears exhausted and tired. Continues on remdesivir and Decadron. Past Medical History:    Reviewed; no changes    Social History:    Reviewed; no changes    REVIEW OF SYSTEMS:    CONSTITUTIONAL:  negative for fevers and chills  RESPIRATORY:  See HPI  CARDIOVASCULAR:  negative for chest pain, palpitations, edema  GASTROINTESTINAL:  negative for nausea, vomiting, diarrhea, constipation and abdominal pain    Objective:   PHYSICAL EXAM:        VITALS:  BP (!) 100/57   Pulse 66   Temp 96.5 °F (35.8 °C) (Temporal)   Resp 24   Ht 5' 4\" (1.626 m)   Wt 170 lb 11.2 oz (77.4 kg)   SpO2 95%   BMI 29.30 kg/m²  on heated high flow 100% and 60 L/min     24HR INTAKE/OUTPUT:      Intake/Output Summary (Last 24 hours) at 11/9/2020 1447  Last data filed at 11/9/2020 1225  Gross per 24 hour   Intake 480 ml   Output 1375 ml   Net -895 ml       PHYSICAL EXAM:     In-person bedside physical examination deferred. Pursuant to the emergency declaration under the [facility], [address] [place] [address] authority and the [employer] Act, this clinical encounter was conducted to provide necessary medical care.    (Also consistent with new provisions and guidance offered by [facility] on March 18, 2020 in setting of COVID 19 outbreak and in order to preserve personal protective equipment in accordance with the flexibilities announced by CMS on March 30, 2020)   References: https://[email or web address]. [facility]/Portals/0/Resources/COVID-19/3_18%20Telemed%20Guidance%20Updated%20March%2018. pdf?mps=[email or web address]                      https://[facility]. [facility]/Portals/0/Resources/COVID-19/3_18%20Telemed%20Guidance%20Updated%20March%2018. pdf?unu=[email or web address]                      [email or web address] pdf                            General: Awake and oriented to time place and person according to primary service, vitals reviewed  HEENT: Deferred  Cardiovascular: Telemetry data reviewed, rest deferred   Pulmonary: deferred  Abdomen/GI: deferred  Neuro: deferred  Skin: deferred  Musculoskeletal:  deferred  Genitourinary: Deferred  Psych: deferred  Lymphatic/Immunologic: deferred      DATA:    CBC:  Recent Labs     11/08/20  0500   WBC 11.3*   RBC 4.20   HGB 12.5   HCT 37.6      MCV 89.5   MCH 29.7   MCHC 33.2   RDW 13.8      BMP:  Recent Labs     11/08/20  0500      K 3.9      CO2 27   BUN 22*   CREATININE <0.5*   CALCIUM 8.3   GLUCOSE 99      ABG:  No results for input(s): PHART, LBY4HEI, PO2ART, ZFP6ULX, I6OGLPGZ, BEART in the last 72 hours. Cultures:    Abx:    Radiology Review:  Pertinent images / reports were reviewed as a part of this visit. Assessment:     1. Acute hypoxemic respiratory failure  2. SARS-CoV-2 pneumonia  3. ARDS    I have reviewed laboratories, medical records and images for this visit  Chest imaging shows diffuse infiltrates consistent with Covid pneumonia  Oxygen requirement has decreased to heated high flow with FiO2 70% and flow 55 L/min  Saturations remain in the high 90s  Continue to wean oxygen as tolerated to keep SPO2 above 89%  On day #8 of remdesivir. We will continue for a total of 10 days. Also receiving Decadron 20 mg daily. Day 9. Will repeat inflammatory markers today.   Continue current dose of Decadron  D-dimer is up to 2000  Now on full dose Lovenox          Marlene Hussein MD Pulmonary Critical Care and Sleep Medicine  [facility] AT [facility], Suze Martins, [address]  10/31/2020, 2:50 PM

## 2020-11-09 NOTE — PROGRESS NOTES
Infectious Diseases   Progress Note      Admission Date: 10/31/2020  Hospital Day: Hospital Day: 10   Attending: Ej Frazier MD  Date of service: 11/9/2020     Chief complaint/ Reason for consult:     · Acute respiratory failure with hypoxia  · COVID-19 pneumonia  · Elevated D-dimer  · Elevated CRP in setting of COVID-19  · Leukopenia in setting of COVID-19    Microbiology:        I have reviewed allavailable micro lab data and cultures    Blood culture (2/2) - collected on 10/31/2020: Negative so far    Antibiotics and immunizations:       Current antibiotics: All antibiotics and their doses were reviewed by me    Recent Abx Admin                   remdesivir 100 mg in sodium chloride 0.9 % 250 mL IVPB (mg) 100 mg New Bag 11/09/20 1225                  Immunization History: All immunization history was reviewed by me today. There is no immunization history on file for this patient. Known drug allergies: All allergies were reviewed and updated    No Known Allergies    Social history:     Social History:  All social andepidemiologic history was reviewed and updated by me today as needed. · Tobacco use:   reports that she has quit smoking. She has never used smokeless tobacco.  · Alcohol use:   reports current alcohol use. · Currently lives in: [address] [place] [address] Dr Carrasco  has no history on file for drug. Assessment:     The patient is a 67 y.o. old female who  has a past medical history of Back pain, Cancer ([address] Essential hypertension, Headache, Hypothyroidism, Migraines, Thyroid disease, and UTI (lower urinary tract infection).  with following problems:    · Acute respiratory failure with hypoxia-currently on 45 L oxygen via high flow nasal cannula, today is day 9 of remdesivir  · COVID-19 pneumonia-this is ongoing  · Elevated D-dimer-monitor trends  · Elevated CRP in setting of COVID-19-continue to monitor trends  · Leukopenia in setting of COVID-19-resolved   · Essential hypertension-blood pressure okay. · Hypothyroidism  · Ex-smoker  · Overweight* due to excess calorie intake : Body mass index is 29.3 kg/m². Discussion:      The patient is on IV remdesivir. Today is day 9 of IV remdesivir. She is on 45 L oxygen via heated high flow nasal cannula. Serum creatinine 0.5. CRP is 167, LDH is 304. D-dimer is 2034. Plan:     Diagnostic Workup:    · Continue to monitor LDH, procalcitonin, D-dimer, CRP every other day  · Continue to follow  fever curve, WBC count and blood cultures  · Follow up on blood counts, liver and renal function    Antimicrobials:    · Will continue IV remdesivir 100 mg every 24 hours  · Continue IV Decadron 20 mg daily  · Continue high flow oxygen support to maintain oxygen saturation above 92%  · Maintain good glycemic control while on remdesivir and Decadron  · Continue to watch for new fever or productive cough or other signs of secondary bacterial infection  · No indication for empiric antibiotics at this time  · Aspiration precautions  · Continue to watch for diarrhea  · Anticoagulation per primary and pulmonary  · Continue close monitoring in COVID-19 unit      Drug Monitoring:    · Continue monitoring for antibiotic toxicity as follows: *CBC, CMP  · Continue to watch for following: new or worsening fever, new hypotension, hives, lip swelling and redness or purulence at vascular access sites. I/v access Management:    · Continue to monitor i.v access sites for erythema, induration, discharge or tenderness. · As always, continue efforts to minimize tubes/lines/drains as clinically appropriate to reduce chances of line associated infections. Level of complexity of visit: High     Risk of Complications/Morbidity: High     · Illness(es)/ Infection present that pose threat to life/bodily function. · There is potential for severe exacerbation of infection/side effects of treatment.   · Therapy requires intensive monitoring for antimicrobial agent toxicity. Thank you for involving me in the care of your patient. I will continue to follow. If you have anyadditional questions, please do not hesitate to contact me. Subjective: Interval history: Interval history was obtained from chart review and RN. The patient is afebrile. She is on 45 L oxygen via heated high flow nasal cannula. She is tolerating IV remdesivir and Decadron okay. REVIEW OF SYSTEMS:      Review of Systems   Unable to perform ROS: Acuity of condition         Past Medical History: All past medical history reviewed today. 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 Past Surgical History: All past surgical history was reviewed today. Past Surgical History:   Procedure Laterality Date    BLADDER SURGERY  08/2016    restructured bladder     MOHS SURGERY  01/10/2019    right jawline anterior inferior       Family History: All family history was reviewed today. Problem Relation Age of Onset    Heart Disease Daughter        Objective:       PHYSICAL EXAM:      Vitals:   Vitals:    11/09/20 1301 11/09/20 1400 11/09/20 1500 11/09/20 1532   BP:    116/63   Pulse:  66 64 62   Resp: 24   25   Temp:    98.2 °F (36.8 °C)   TempSrc:    Temporal   SpO2: 95% 95% 96% 94%   Weight:       Height:           Physical Exam       PHYSICAL EXAM:     In-person bedside physical examination deferred. Pursuant to the emergency declaration under the [facility] [address] authority and the [employer] Act, this clinical encounter was conducted to provide necessary medical care.    (Also consistent with new provisions and guidance offered by [place] upper extremity PICC line extends to expected location of cavoatrial   junction. No significant interval change in multifocal bilateral airspace disease as   compared to prior. XR CHEST PORTABLE   Final Result   Unchanged chest over the past 3 days with diffuse pulmonary disease, favored   to be related to pneumonia         XR CHEST PORTABLE   Final Result   Multifocal airspace opacities, slightly progressed on the right. XR CHEST PORTABLE   Final Result   No acute interval change. Bilateral airspace disease is similar to prior   exam.         XR CHEST PORTABLE   Final Result   Multifocal ground-glass opacification likely pneumonia including atypical   viral pneumonia. Pulmonary edema is a less likely etiology for the findings. Medications: All current and past medications were reviewed.      remdesivir IVPB  100 mg Intravenous Q24H    enoxaparin  1 mg/kg Subcutaneous BID    lidocaine 1 % injection  5 mL Intradermal Once    sodium chloride flush  10 mL Intravenous 2 times per day    dexamethasone  20 mg Intravenous Daily    levothyroxine  112 mcg Oral Daily    venlafaxine  225 mg Oral Daily    aspirin  81 mg Oral Daily    calcium elemental  500 mg Oral Daily    sodium chloride flush  10 mL Intravenous 2 times per day    albuterol sulfate HFA  2 puff Inhalation 4x daily    And    ipratropium  2 puff Inhalation 4x daily           sodium chloride flush, calcium carbonate, lip balm petroleum free, sodium chloride, albuterol sulfate HFA, ALPRAZolam, sodium chloride flush, acetaminophen **OR** acetaminophen, magnesium hydroxide, promethazine **OR** ondansetron, sodium chloride, potassium chloride **OR** potassium alternative oral replacement **OR** potassium chloride, labetalol      Problem list:       Patient Active Problem List   Diagnosis Code    Squamous cell cancer of skin of jawline C44.329    COVID-19 U07.1    COVID-19 virus infection U07.1    Depression F32.9   

## 2020-11-09 NOTE — PROGRESS NOTES
Assessment and VS complete. See flowsheet. Pt A&O. Pt has generalized weakness. States she just feels like sleeping, however is feeling better overall. States she is ready to go home. Denies pain. SOB still with exertion, tachypneic at rest. Still on heated O2- has been weaned as appropriate. Night time meds given per MD order. POC discussed. Pt denies further needs. Call light in reach.

## 2020-11-09 NOTE — PROGRESS NOTES
No acute changes noted to assessment. See flowsheets for complete details. VSS. Pt denies pain. Repositioned for comfort. No needs expressed. Call light within reach.

## 2020-11-10 LAB
ANION GAP SERPL CALCULATED.3IONS-SCNC: 9 MMOL/L (ref 3–16)
BUN BLDV-MCNC: 23 MG/DL (ref 7–20)
CALCIUM SERPL-MCNC: 8.8 MG/DL (ref 8.3–10.6)
CHLORIDE BLD-SCNC: 104 MMOL/L (ref 99–110)
CO2: 25 MMOL/L (ref 21–32)
CREAT SERPL-MCNC: <0.5 MG/DL (ref 0.6–1.2)
GFR AFRICAN AMERICAN: >60
GFR NON-AFRICAN AMERICAN: >60
GLUCOSE BLD-MCNC: 136 MG/DL (ref 70–99)
HCT VFR BLD CALC: 39.5 % (ref 36–48)
HEMOGLOBIN: 13.2 G/DL (ref 12–16)
MCH RBC QN AUTO: 30.5 PG (ref 26–34)
MCHC RBC AUTO-ENTMCNC: 33.4 G/DL (ref 31–36)
MCV RBC AUTO: 91.3 FL (ref 80–100)
PDW BLD-RTO: 13.3 % (ref 12.4–15.4)
PLATELET # BLD: 295 K/UL (ref 135–450)
PMV BLD AUTO: 7.8 FL (ref 5–10.5)
POTASSIUM SERPL-SCNC: 4.4 MMOL/L (ref 3.5–5.1)
RBC # BLD: 4.33 M/UL (ref 4–5.2)
SODIUM BLD-SCNC: 138 MMOL/L (ref 136–145)
WBC # BLD: 8.1 K/UL (ref 4–11)

## 2020-11-10 PROCEDURE — 2500000003 HC RX 250 WO HCPCS: Performed by: INTERNAL MEDICINE

## 2020-11-10 PROCEDURE — 6360000002 HC RX W HCPCS: Performed by: INTERNAL MEDICINE

## 2020-11-10 PROCEDURE — 2700000000 HC OXYGEN THERAPY PER DAY

## 2020-11-10 PROCEDURE — 2000000000 HC ICU R&B

## 2020-11-10 PROCEDURE — 2580000003 HC RX 258: Performed by: INTERNAL MEDICINE

## 2020-11-10 PROCEDURE — 94640 AIRWAY INHALATION TREATMENT: CPT

## 2020-11-10 PROCEDURE — 6370000000 HC RX 637 (ALT 250 FOR IP): Performed by: INTERNAL MEDICINE

## 2020-11-10 PROCEDURE — 85027 COMPLETE CBC AUTOMATED: CPT

## 2020-11-10 PROCEDURE — 94761 N-INVAS EAR/PLS OXIMETRY MLT: CPT

## 2020-11-10 PROCEDURE — 99233 SBSQ HOSP IP/OBS HIGH 50: CPT | Performed by: INTERNAL MEDICINE

## 2020-11-10 PROCEDURE — 99232 SBSQ HOSP IP/OBS MODERATE 35: CPT | Performed by: INTERNAL MEDICINE

## 2020-11-10 PROCEDURE — 80048 BASIC METABOLIC PNL TOTAL CA: CPT

## 2020-11-10 RX ORDER — DEXAMETHASONE SODIUM PHOSPHATE 10 MG/ML
10 INJECTION, SOLUTION INTRAMUSCULAR; INTRAVENOUS DAILY
Status: DISCONTINUED | OUTPATIENT
Start: 2020-11-10 | End: 2020-11-13

## 2020-11-10 RX ADMIN — ALBUTEROL SULFATE 2 PUFF: 90 AEROSOL, METERED RESPIRATORY (INHALATION) at 20:43

## 2020-11-10 RX ADMIN — REMDESIVIR 100 MG: 100 INJECTION, POWDER, LYOPHILIZED, FOR SOLUTION INTRAVENOUS at 11:01

## 2020-11-10 RX ADMIN — ASPIRIN 81 MG: 81 TABLET, COATED ORAL at 09:55

## 2020-11-10 RX ADMIN — ACETAMINOPHEN 650 MG: 325 TABLET ORAL at 20:21

## 2020-11-10 RX ADMIN — ENOXAPARIN SODIUM 80 MG: 80 INJECTION SUBCUTANEOUS at 09:55

## 2020-11-10 RX ADMIN — IPRATROPIUM BROMIDE 2 PUFF: 17 AEROSOL, METERED RESPIRATORY (INHALATION) at 16:19

## 2020-11-10 RX ADMIN — ALBUTEROL SULFATE 2 PUFF: 90 AEROSOL, METERED RESPIRATORY (INHALATION) at 16:19

## 2020-11-10 RX ADMIN — IPRATROPIUM BROMIDE 2 PUFF: 17 AEROSOL, METERED RESPIRATORY (INHALATION) at 08:03

## 2020-11-10 RX ADMIN — DEXAMETHASONE SODIUM PHOSPHATE 10 MG: 10 INJECTION, SOLUTION INTRAMUSCULAR; INTRAVENOUS at 09:56

## 2020-11-10 RX ADMIN — Medication 10 ML: at 20:21

## 2020-11-10 RX ADMIN — Medication 10 ML: at 09:56

## 2020-11-10 RX ADMIN — ENOXAPARIN SODIUM 80 MG: 80 INJECTION SUBCUTANEOUS at 20:21

## 2020-11-10 RX ADMIN — LEVOTHYROXINE SODIUM 112 MCG: 0.11 TABLET ORAL at 04:37

## 2020-11-10 RX ADMIN — ALBUTEROL SULFATE 2 PUFF: 90 AEROSOL, METERED RESPIRATORY (INHALATION) at 08:04

## 2020-11-10 RX ADMIN — ALBUTEROL SULFATE 2 PUFF: 90 AEROSOL, METERED RESPIRATORY (INHALATION) at 11:54

## 2020-11-10 RX ADMIN — IPRATROPIUM BROMIDE 2 PUFF: 17 AEROSOL, METERED RESPIRATORY (INHALATION) at 20:43

## 2020-11-10 RX ADMIN — Medication 500 MG: at 09:55

## 2020-11-10 RX ADMIN — VENLAFAXINE HYDROCHLORIDE 225 MG: 150 CAPSULE, EXTENDED RELEASE ORAL at 09:55

## 2020-11-10 RX ADMIN — IPRATROPIUM BROMIDE 2 PUFF: 17 AEROSOL, METERED RESPIRATORY (INHALATION) at 11:52

## 2020-11-10 ASSESSMENT — ENCOUNTER SYMPTOMS
DIARRHEA: 0
CHOKING: 0
CHEST TIGHTNESS: 0
EYE DISCHARGE: 0
NAUSEA: 0
RHINORRHEA: 0
BLOOD IN STOOL: 0
COUGH: 1
APNEA: 0
FACIAL SWELLING: 0
SHORTNESS OF BREATH: 1
ABDOMINAL PAIN: 0
STRIDOR: 0
EYE REDNESS: 0
TROUBLE SWALLOWING: 0
COLOR CHANGE: 0
PHOTOPHOBIA: 0

## 2020-11-10 ASSESSMENT — PAIN SCALES - GENERAL
PAINLEVEL_OUTOF10: 0
PAINLEVEL_OUTOF10: 10
PAINLEVEL_OUTOF10: 0
PAINLEVEL_OUTOF10: 10
PAINLEVEL_OUTOF10: 0

## 2020-11-10 ASSESSMENT — PAIN DESCRIPTION - DESCRIPTORS: DESCRIPTORS: BURNING;SHARP;THROBBING

## 2020-11-10 ASSESSMENT — PAIN DESCRIPTION - PAIN TYPE: TYPE: ACUTE PAIN

## 2020-11-10 ASSESSMENT — PAIN DESCRIPTION - LOCATION: LOCATION: OTHER (COMMENT)

## 2020-11-10 NOTE — PROGRESS NOTES
[facility]    Follow Up Note    Subjective:   CHIEF COMPLAINT / HPI:   Chief Complaint   Patient presents with    Shortness of Breath     Pt brought by [facility] from home with CC of SOB that started Tuesday. Pt reports being dx'ed with COVID on Tuesday reports recent admission at [facility]. pt on RA 88%, 94%on 2L. Interval history: Respiratory status is slowly improving. Oxygen requirements have come down. Appears exhausted and tired. Continues on remdesivir and Decadron. Past Medical History:    Reviewed; no changes    Social History:    Reviewed; no changes    REVIEW OF SYSTEMS:    CONSTITUTIONAL:  negative for fevers and chills  RESPIRATORY:  See HPI  CARDIOVASCULAR:  negative for chest pain, palpitations, edema  GASTROINTESTINAL:  negative for nausea, vomiting, diarrhea, constipation and abdominal pain    Objective:   PHYSICAL EXAM:        VITALS:  /76   Pulse 59   Temp 97.1 °F (36.2 °C) (Temporal)   Resp 20   Ht 5' 4\" (1.626 m)   Wt 168 lb (76.2 kg)   SpO2 95%   BMI 28.84 kg/m²  on heated high flow 100% and 60 L/min     24HR INTAKE/OUTPUT:      Intake/Output Summary (Last 24 hours) at 11/10/2020 1447  Last data filed at 11/10/2020 1300  Gross per 24 hour   Intake 1442.34 ml   Output 925 ml   Net 517.34 ml       PHYSICAL EXAM:     In-person bedside physical examination deferred. Pursuant to the emergency declaration under the [facility], [address] [place] [address] and the [employer] Act, this clinical encounter was conducted to provide necessary medical care.    (Also consistent with new provisions and guidance offered by [facility] on March 18, 2020 in setting of COVID 19 outbreak and in order to preserve personal protective equipment in accordance with the flexibilities announced by CMS on March 30, 2020)   References: https://[facility]. [facility]/Portals/0/Resources/COVID-19/3_18%20Telemed%20Guidance%20Updated%20March%2018. pdf?lue=[email or web address]                      https://[facility]/Portals/0/Resources/COVID-19/3_18%20Telemed%20Guidance%20Updated%20March%2018. pdf?zhs=[email or web address]                      [email or web address] pdf                            General: Awake and oriented to time place and person according to primary service, vitals reviewed  HEENT: Deferred  Cardiovascular: Telemetry data reviewed, rest deferred   Pulmonary: deferred  Abdomen/GI: deferred  Neuro: deferred  Skin: deferred  Musculoskeletal:  deferred  Genitourinary: Deferred  Psych: deferred  Lymphatic/Immunologic: deferred      DATA:    CBC:  Recent Labs     11/08/20  0500 11/10/20  0455   WBC 11.3* 8.1   RBC 4.20 4.33   HGB 12.5 13.2   HCT 37.6 39.5    295   MCV 89.5 91.3   MCH 29.7 30.5   MCHC 33.2 33.4   RDW 13.8 13.3      BMP:  Recent Labs     11/08/20  0500 11/10/20  0455    138   K 3.9 4.4    104   CO2 27 25   BUN 22* 23*   CREATININE <0.5* <0.5*   CALCIUM 8.3 8.8   GLUCOSE 99 136*      ABG:  No results for input(s): PHART, RFQ9AEF, PO2ART, PDC0ICZ, P2GWJAET, BEART in the last 72 hours. Cultures:    Abx:    Radiology Review:  Pertinent images / reports were reviewed as a part of this visit. Assessment:     1. Acute hypoxemic respiratory failure  2. SARS-CoV-2 pneumonia  3. ARDS    I have reviewed laboratories, medical records and images for this visit  Chest imaging shows diffuse infiltrates consistent with Covid pneumonia  Oxygen requirement has decreased to heated high flow with FiO2 55% and flow 40 L/min  Saturations remain in the high 90s  Continue to wean oxygen as tolerated to keep SPO2 above 89%  On day #9 of remdesivir. We will continue for a total of 10 days. Also receiving Decadron 20 mg daily. Day 10.   We will decrease the Decadron to 10 mg IV daily.  Amatory markers trending down.   D-dimer is up to 2000  Now on full dose Bess Loyd MD   Pulmonary Critical Care and Sleep Medicine  [address] [facility]   [address], [address]  10/31/2020, 2:47 PM

## 2020-11-10 NOTE — PROGRESS NOTES
Shift assessment completed. See complex assessment in doc flowsheet. Pt is A&Ox4. NSR on the monitor. Lungs diminished and pt on AirVO with FIO2 55% and 40LO2. Abd is soft/rounded with +bs x4 quadrants. Rankin is patent and draining yellow urine. Skin is noted to have scattered bruising. Pt denies pain. Bed in lowest position, wheels locked, and alarm active. Updated pt on POC and all questions answered. No needs expressed. Call light within reach.

## 2020-11-10 NOTE — PROGRESS NOTES
Hospitalist Progress Note      PCP: Hao Butterfield MD    Date of Admission: 10/31/2020    Chief Complaint:   [facility] Course:   67 y. o. 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  Patient says she initially started getting sick on 10/11/2020 with having progressive shortness of air.  She was admitted to [facility] for 3 days was discharged out without any medications.  Patient says she has been attempting to manage her symptoms at home without any relief.  Said that she did have a fever of 101 this morning and has not taken any antipyretics 4 hours prior to arrival. BERNADETTE GUERRERO [facility] daughter did note that she sat up for approximately 45 minutes yesterday and became very fatigued and had to lay down.  She has had decreased activity due to her dyspnea on exertion.  Says she has had decreased p.o. intake doing to feeling very fatigued and short of air. denies having home O2.  She is currently on 4L 02 in ER here. Will need admission for acute resp fialure         Subjective:   Currently on 70% 50  L heated nasal cannula  She is SOB but tolerating this. She appears comfortable and is in no distress.   No acute event overnight    11/10  Alert oriented  On 55% 40 L heated nasal cannula  D-dimer decreased to 2034  On Decadron, had convalescent plasma, on remdesivir, Lovenox full dose  No acute event overnight      Medications:  Reviewed    Infusion Medications   Scheduled Medications    dexamethasone  10 mg Intravenous Daily    enoxaparin  1 mg/kg Subcutaneous BID    lidocaine 1 % injection  5 mL Intradermal Once    sodium chloride flush  10 mL Intravenous 2 times per day    levothyroxine  112 mcg Oral Daily    venlafaxine  225 mg Oral Daily    aspirin  81 mg Oral Daily    calcium elemental  500 mg Oral Daily    sodium K 3.9 4.4    104   CO2 27 25   BUN 22* 23*   CREATININE <0.5* <0.5*   CALCIUM 8.3 8.8   PHOS 3.6  --      No results for input(s): AST, ALT, BILIDIR, BILITOT, ALKPHOS in the last 72 hours. No results for input(s): INR in the last 72 hours. No results for input(s): Jadene Moh in the last 72 hours. Urinalysis:      Lab Results   Component Value Date    NITRU Negative 10/31/2020    WBCUA 2 10/31/2020    RBCUA 2 10/31/2020    BLOODU Negative 10/31/2020    SPECGRAV 1.013 10/31/2020    GLUCOSEU Negative 10/31/2020       Radiology:  XR CHEST PORTABLE   Final Result   Right upper extremity PICC line extends to expected location of cavoatrial   junction. No significant interval change in multifocal bilateral airspace disease as   compared to prior. XR CHEST PORTABLE   Final Result   Unchanged chest over the past 3 days with diffuse pulmonary disease, favored   to be related to pneumonia         XR CHEST PORTABLE   Final Result   Multifocal airspace opacities, slightly progressed on the right. XR CHEST PORTABLE   Final Result   No acute interval change. Bilateral airspace disease is similar to prior   exam.         XR CHEST PORTABLE   Final Result   Multifocal ground-glass opacification likely pneumonia including atypical   viral pneumonia. Pulmonary edema is a less likely etiology for the findings. Assessment/Plan:    Active Hospital Problems    Diagnosis    Acute respiratory failure due to COVID-19 (HCC) [U07.1, J96.00]    Fever [R50.9]    Elevated d-dimer [R79.89]    Elevated C-reactive protein (CRP) [R79.82]    Essential hypertension [I10]    Ex-smoker [Z87.891]    Overweight [E66.3]    COVID-19 [U07.1]    COVID-19 virus infection [U07.1]    Depression [F32.9]    Hypothyroid [E03.9]       Acute respiratory failure with hypoxia  Secondary to COVID-19 pneumonia  Continues to require heated high flow FiO2 70% and 50 L/min flow.   Heated high flow at 55%, 40 L  she continues on Remdesivir ( DAY 10) She is in Decadron  daily  she has completed convalescent plasma. elevated CRP  D-dimer 2034  she is in full dose anticoagulation.     Hypothyroid  resume home meds    HTN  home meds    DVT Prophylaxis: lovenox  Diet: DIET GENERAL;  Code Status: Full Code    PT/OT Eval Status: when appropriate    Dispo - Continue ICU level of care , LTAC is following    30 min critical care time spent     Linsey Ruiz MD

## 2020-11-10 NOTE — PROGRESS NOTES
Infectious Diseases   Progress Note      Admission Date: 10/31/2020  Hospital Day: Hospital Day: 11   Attending: Imer Early MD  Date of service: 11/10/2020     Chief complaint/ Reason for consult:     · Acute respiratory failure with hypoxia  · COVID-19 pneumonia  · Elevated D-dimer  · Elevated CRP in setting of COVID-19  · Leukopenia in setting of COVID-19    Microbiology:        I have reviewed allavailable micro lab data and cultures    Blood culture (2/2) - collected on 10/31/2020: Negative so far    Antibiotics and immunizations:       Current antibiotics: All antibiotics and their doses were reviewed by me    Recent Abx Admin                   remdesivir 100 mg in sodium chloride 0.9 % 250 mL IVPB (mg) 100 mg New Bag 11/10/20 1101                  Immunization History: All immunization history was reviewed by me today. There is no immunization history on file for this patient. Known drug allergies: All allergies were reviewed and updated    No Known Allergies    Social history:     Social History:  All social andepidemiologic history was reviewed and updated by me today as needed. · Tobacco use:   reports that she has quit smoking. She has never used smokeless tobacco.  · Alcohol use:   reports current alcohol use. · Currently lives in: [address] [place] [address] Dr Carrasco  has no history on file for drug. Assessment:     The patient is a 67 y.o. old female who  has a past medical history of Back pain, Cancer ([address] Essential hypertension, Headache, Hypothyroidism, Migraines, Thyroid disease, and UTI (lower urinary tract infection).  with following problems:    · Acute respiratory failure with hypoxia-   on 25 L oxygen via high flow nasal cannula  · COVID-19 pneumonia-v ongoing  · Elevated D-dimer-monitor trends  · Elevated CRP in setting of COVID-19-monitor trends   · Leukopenia in setting of COVID-19-resolved   · Essential hypertension- BP Ok  · Hypothyroidism  · Ex-smoker  · Overweight* due to excess calorie intake : Body mass index is 28.84 kg/m². Discussion:      Today is day 10 of IV remdesivir. The patient is on 25 L oxygen via high flow nasal cannula. Serum creatinine 0.5. CRP 94.2. Serum ferritin is 747.7. Plan:     Diagnostic Workup:    · Continue to monitor liver and renal function  · Continue to follow  fever curve, WBC count and blood cultures      Antimicrobials:    · The patient has completed a 10-day course of IV remdesivir. No data that supports extension of remdesivir beyond 10 days  · Continue Decadron 10 mg IV daily  · Continue to monitor her vitals closely  · Continue high flow oxygen support to maintain oxygen saturation above 92%  · Fall and aspiration precautions  · Continue to watch for new fever or other signs of contributory infection  · Anticoagulation per primary and pulmonary  · Encourage frequent change in posture and prone positioning as tolerated        I/v access Management:    · Continue to monitor i.v access sites for erythema, induration, discharge or tenderness. · As always, continue efforts to minimize tubes/lines/drains as clinically appropriate to reduce chances of line associated infections. Risk of Complications/Morbidity: High     · Illness(es)/ Infection present that pose threat to life/bodily function. · There is potential for severe exacerbation of infection/side effects of treatment. · Therapy requires intensive monitoring for antimicrobial agent toxicity. Thank you for involving me in the care of your patient. I will continue to follow. If you have anyadditional questions, please do not hesitate to contact me. Subjective: Interval history: Interval history was obtained from chart review and RN. She is afebrile. She 25 L oxygen high flow nasal cannula    REVIEW OF SYSTEMS:        Review of Systems   Constitutional: Negative for chills, diaphoresis and unexpected weight change.    HENT: Negative for congestion, ear discharge, ear pain, facial swelling, hearing loss, rhinorrhea and trouble swallowing. Eyes: Negative for photophobia, discharge, redness and visual disturbance. Respiratory: Positive for cough and shortness of breath. Negative for apnea, choking, chest tightness and stridor. Cardiovascular: Negative for chest pain and palpitations. Gastrointestinal: Negative for abdominal pain, blood in stool, diarrhea and nausea. Endocrine: Negative for polydipsia, polyphagia and polyuria. Genitourinary: Negative for difficulty urinating, dysuria, frequency, hematuria, menstrual problem and vaginal discharge. Musculoskeletal: Negative for arthralgias, joint swelling, myalgias and neck stiffness. Skin: Negative for color change and rash. Allergic/Immunologic: Negative for immunocompromised state. Neurological: Negative for dizziness, seizures, speech difficulty, light-headedness and headaches. Hematological: Negative for adenopathy. Psychiatric/Behavioral: Negative for agitation, hallucinations and suicidal ideas. Past Medical History: All past medical history reviewed today. Past Medical History:   Diagnosis Date    Back pain     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 Past Surgical History: All past surgical history was reviewed today. Past Surgical History:   Procedure Laterality Date    BLADDER SURGERY  08/2016    restructured bladder     MOHS SURGERY  01/10/2019    right jawline anterior inferior       Family History: All family history was reviewed today.         Problem Relation Age of Onset    Heart Disease Daughter        Objective:       PHYSICAL EXAM:      Vitals:   Vitals:    11/10/20 2000 11/10/20 2043 11/10/20 2046 11/10/20 2100   BP: 132/80      Pulse: 76   63   Resp: 21 20 20    Temp: 97.2 °F (36.2 °C)      TempSrc: Temporal      SpO2: 97% 95% 95% 97%   Weight:       Height:           Physical Exam       PHYSICAL EXAM:     In-person bedside physical examination deferred. Pursuant to the emergency declaration under the [facility], [address] [place] [address] and the [employer] and [employer] Act, this clinical encounter was conducted to provide necessary medical care. (Also consistent with new provisions and guidance offered by [facility] on March 18, 2020 in setting of COVID 19 outbreak and in order to preserve personal protective equipment in accordance with the flexibilities announced by CMS on March 30, 2020)   References: https://[facility]. [facility]/Portals/0/Resources/COVID-19/3_18%20Telemed%20Guidance%20Updated%20March%2018. pdf?mid=[email or web address]                      https://[facility]. [facility]/Portals/0/Resources/COVID-19/3_18%20Telemed%20Guidance%20Updated%20March%2018. pdf?pdr=[email or web address]                      http://[employer]/. pdf                            General: Awake and oriented to time place and person according to primary service, vitals reviewed  HEENT: Deferred  Cardiovascular: Telemetry data reviewed, rest deferred   Pulmonary: deferred  Abdomen/GI: deferred  Neuro: deferred  Skin: deferred  Musculoskeletal:  deferred  Genitourinary: Deferred  Psych: deferred  Lymphatic/Immunologic: deferred    Intake and output:    I/O last 3 completed shifts: In: 1104.4 [P.O.:840; I.V.:264.4]  Out: 1225 [Urine:1225]    Lab Data:   All available labs and old records have been reviewed by me.     CBC:  Recent Labs     11/08/20  0500 11/10/20  0455   WBC 11.3* 8.1   RBC 4.20 4.33   HGB 12.5 13.2   HCT 37.6 39.5    295   MCV 89.5 91.3   MCH 29.7 30.5   MCHC 33.2 33.4   RDW 13.8 13.3        BMP:  Recent Labs     11/08/20  0500 11/10/20  0455    138   K 3.9 4.4    104   CO2 27 25   BUN 22* 23*   CREATININE <0.5* <0.5*   CALCIUM 8.3 8.8   GLUCOSE 99 136*        Hepatic Function Panel:   Lab Results   Component Value Date    ALKPHOS 70 11/05/2020    ALT 35 11/05/2020    AST 19 11/05/2020    PROT 5.7 11/05/2020    BILITOT 0.5 11/05/2020    LABALBU 2.6 11/05/2020       CPK: No results found for: CKTOTAL  ESR: No results found for: SEDRATE  CRP:   Lab Results   Component Value Date    CRP 94.2 (H) 11/09/2020           Imaging: All pertinent images and reports for the current visit were reviewed by me during this visit. XR CHEST PORTABLE   Final Result   Right upper extremity PICC line extends to expected location of cavoatrial   junction. No significant interval change in multifocal bilateral airspace disease as   compared to prior. XR CHEST PORTABLE   Final Result   Unchanged chest over the past 3 days with diffuse pulmonary disease, favored   to be related to pneumonia         XR CHEST PORTABLE   Final Result   Multifocal airspace opacities, slightly progressed on the right. XR CHEST PORTABLE   Final Result   No acute interval change. Bilateral airspace disease is similar to prior   exam.         XR CHEST PORTABLE   Final Result   Multifocal ground-glass opacification likely pneumonia including atypical   viral pneumonia. Pulmonary edema is a less likely etiology for the findings. Medications: All current and past medications were reviewed.      dexamethasone  10 mg Intravenous Daily    enoxaparin  1 mg/kg Subcutaneous BID    lidocaine 1 % injection  5 mL Intradermal Once    sodium chloride flush  10 mL Intravenous 2 times per day    levothyroxine  112 mcg Oral Daily    venlafaxine  225 mg Oral Daily    aspirin  81 mg Oral Daily    calcium elemental  500 mg Oral Daily    sodium chloride flush  10 mL Intravenous 2 times per day    albuterol sulfate HFA  2 puff Inhalation 4x daily And    ipratropium  2 puff Inhalation 4x daily           sodium chloride flush, calcium carbonate, lip balm petroleum free, sodium chloride, albuterol sulfate HFA, ALPRAZolam, sodium chloride flush, acetaminophen **OR** acetaminophen, magnesium hydroxide, promethazine **OR** ondansetron, sodium chloride, potassium chloride **OR** potassium alternative oral replacement **OR** potassium chloride, labetalol      Problem list:       Patient Active Problem List   Diagnosis Code    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 Although every effort was made to ensure the accuracy of this automated transcription, some errors in transcription may have occurred inadvertently. If you may need any clarification, please do not hesitate to contact me through EPIC or at the phone number provided below with my electronic signature. Any pictures or media included in this note were obtained after taking informed verbal consent from the patient and with their approval to include those in the patient's medical record.     Carmen Alvarez MD, MPH  11/10/2020 , 9:36 PM   [facility] Infectious Disease   [address] [place] [address], [address] [place] [address]  Office: [phone]  Fax: [phone]  Clinic days:  Tuesday & Thursday

## 2020-11-10 NOTE — PROGRESS NOTES
Reassessment complete. See flowsheets for complete details. VSS. No acute changes noted. SB on the monitor. Lungs remain diminished and pt remains on AirVo with FIO2 60% and 30LO2. Pt denies pain. No needs expressed. Call light within reach.

## 2020-11-10 NOTE — PROGRESS NOTES
Assessment and VS complete. See flowsheet. Pt A&O. States she is feeling much better. SPO2 93% on 45L O2 per Vapotherm, at 58%FiO2. Tolerating very well. Still have SOB with mild exertion. Pulled up in bed and repositioned for comfort. Pt needs encouragement to turn in bed. Discussed potential for skin breakdown while lying in bed. States understanding. Night time meds given per MD order. POC discussed. Pt denies further needs. Call light in reach.

## 2020-11-11 LAB
ANION GAP SERPL CALCULATED.3IONS-SCNC: 8 MMOL/L (ref 3–16)
BACTERIA: ABNORMAL /HPF
BILIRUBIN URINE: NEGATIVE
BLOOD, URINE: ABNORMAL
BUN BLDV-MCNC: 28 MG/DL (ref 7–20)
C-REACTIVE PROTEIN: 31.4 MG/L (ref 0–5.1)
CALCIUM SERPL-MCNC: 8.7 MG/DL (ref 8.3–10.6)
CHLORIDE BLD-SCNC: 106 MMOL/L (ref 99–110)
CLARITY: ABNORMAL
CO2: 24 MMOL/L (ref 21–32)
COLOR: ABNORMAL
CREAT SERPL-MCNC: 0.5 MG/DL (ref 0.6–1.2)
D DIMER: 883 NG/ML DDU (ref 0–229)
FERRITIN: 666.9 NG/ML (ref 15–150)
FIBRINOGEN: 462 MG/DL (ref 200–397)
GFR AFRICAN AMERICAN: >60
GFR NON-AFRICAN AMERICAN: >60
GLUCOSE BLD-MCNC: 101 MG/DL (ref 70–99)
GLUCOSE URINE: NEGATIVE MG/DL
KETONES, URINE: NEGATIVE MG/DL
LACTATE DEHYDROGENASE: 283 U/L (ref 100–190)
LEUKOCYTE ESTERASE, URINE: NEGATIVE
MICROSCOPIC EXAMINATION: YES
NITRITE, URINE: NEGATIVE
PH UA: 6.5 (ref 5–8)
POTASSIUM SERPL-SCNC: 4.2 MMOL/L (ref 3.5–5.1)
PROTEIN UA: 30 MG/DL
RBC UA: >100 /HPF (ref 0–4)
SODIUM BLD-SCNC: 138 MMOL/L (ref 136–145)
SPECIFIC GRAVITY UA: 1.02 (ref 1–1.03)
URINE REFLEX TO CULTURE: ABNORMAL
URINE TYPE: ABNORMAL
UROBILINOGEN, URINE: 0.2 E.U./DL
WBC UA: ABNORMAL /HPF (ref 0–5)

## 2020-11-11 PROCEDURE — 83615 LACTATE (LD) (LDH) ENZYME: CPT

## 2020-11-11 PROCEDURE — 6370000000 HC RX 637 (ALT 250 FOR IP): Performed by: INTERNAL MEDICINE

## 2020-11-11 PROCEDURE — 6360000002 HC RX W HCPCS: Performed by: INTERNAL MEDICINE

## 2020-11-11 PROCEDURE — 82728 ASSAY OF FERRITIN: CPT

## 2020-11-11 PROCEDURE — 94761 N-INVAS EAR/PLS OXIMETRY MLT: CPT

## 2020-11-11 PROCEDURE — 80048 BASIC METABOLIC PNL TOTAL CA: CPT

## 2020-11-11 PROCEDURE — 2700000000 HC OXYGEN THERAPY PER DAY

## 2020-11-11 PROCEDURE — 2000000000 HC ICU R&B

## 2020-11-11 PROCEDURE — 2580000003 HC RX 258: Performed by: INTERNAL MEDICINE

## 2020-11-11 PROCEDURE — 99233 SBSQ HOSP IP/OBS HIGH 50: CPT | Performed by: INTERNAL MEDICINE

## 2020-11-11 PROCEDURE — 51798 US URINE CAPACITY MEASURE: CPT

## 2020-11-11 PROCEDURE — 86140 C-REACTIVE PROTEIN: CPT

## 2020-11-11 PROCEDURE — 94640 AIRWAY INHALATION TREATMENT: CPT

## 2020-11-11 PROCEDURE — 85379 FIBRIN DEGRADATION QUANT: CPT

## 2020-11-11 PROCEDURE — 6360000002 HC RX W HCPCS: Performed by: NURSE PRACTITIONER

## 2020-11-11 PROCEDURE — 85384 FIBRINOGEN ACTIVITY: CPT

## 2020-11-11 PROCEDURE — 36592 COLLECT BLOOD FROM PICC: CPT

## 2020-11-11 PROCEDURE — 51701 INSERT BLADDER CATHETER: CPT

## 2020-11-11 PROCEDURE — 99232 SBSQ HOSP IP/OBS MODERATE 35: CPT | Performed by: INTERNAL MEDICINE

## 2020-11-11 PROCEDURE — 81001 URINALYSIS AUTO W/SCOPE: CPT

## 2020-11-11 RX ADMIN — IPRATROPIUM BROMIDE 2 PUFF: 17 AEROSOL, METERED RESPIRATORY (INHALATION) at 08:08

## 2020-11-11 RX ADMIN — ALBUTEROL SULFATE 2 PUFF: 90 AEROSOL, METERED RESPIRATORY (INHALATION) at 20:10

## 2020-11-11 RX ADMIN — ALTEPLASE 1 MG: 2.2 INJECTION, POWDER, LYOPHILIZED, FOR SOLUTION INTRAVENOUS at 22:21

## 2020-11-11 RX ADMIN — Medication 500 MG: at 09:40

## 2020-11-11 RX ADMIN — ALBUTEROL SULFATE 2 PUFF: 90 AEROSOL, METERED RESPIRATORY (INHALATION) at 12:10

## 2020-11-11 RX ADMIN — VENLAFAXINE HYDROCHLORIDE 225 MG: 150 CAPSULE, EXTENDED RELEASE ORAL at 09:39

## 2020-11-11 RX ADMIN — ENOXAPARIN SODIUM 80 MG: 80 INJECTION SUBCUTANEOUS at 21:12

## 2020-11-11 RX ADMIN — Medication 10 ML: at 09:40

## 2020-11-11 RX ADMIN — Medication 10 ML: at 21:13

## 2020-11-11 RX ADMIN — ALBUTEROL SULFATE 2 PUFF: 90 AEROSOL, METERED RESPIRATORY (INHALATION) at 08:08

## 2020-11-11 RX ADMIN — Medication 10 ML: at 21:12

## 2020-11-11 RX ADMIN — ALBUTEROL SULFATE 2 PUFF: 90 AEROSOL, METERED RESPIRATORY (INHALATION) at 16:54

## 2020-11-11 RX ADMIN — ASPIRIN 81 MG: 81 TABLET, COATED ORAL at 09:39

## 2020-11-11 RX ADMIN — IPRATROPIUM BROMIDE 2 PUFF: 17 AEROSOL, METERED RESPIRATORY (INHALATION) at 16:55

## 2020-11-11 RX ADMIN — ACETAMINOPHEN 650 MG: 325 TABLET ORAL at 22:44

## 2020-11-11 RX ADMIN — DEXAMETHASONE SODIUM PHOSPHATE 10 MG: 10 INJECTION, SOLUTION INTRAMUSCULAR; INTRAVENOUS at 09:40

## 2020-11-11 RX ADMIN — IPRATROPIUM BROMIDE 2 PUFF: 17 AEROSOL, METERED RESPIRATORY (INHALATION) at 12:10

## 2020-11-11 RX ADMIN — LEVOTHYROXINE SODIUM 112 MCG: 0.11 TABLET ORAL at 05:03

## 2020-11-11 RX ADMIN — ENOXAPARIN SODIUM 80 MG: 80 INJECTION SUBCUTANEOUS at 09:40

## 2020-11-11 RX ADMIN — IPRATROPIUM BROMIDE 2 PUFF: 17 AEROSOL, METERED RESPIRATORY (INHALATION) at 20:10

## 2020-11-11 ASSESSMENT — PAIN SCALES - GENERAL
PAINLEVEL_OUTOF10: 0
PAINLEVEL_OUTOF10: 4
PAINLEVEL_OUTOF10: 0

## 2020-11-11 NOTE — PROGRESS NOTES
Shift assessment completed, patient with one BM at this time, no urine. PAtient enrouraged to drink fluids with breakfast, will bladder scan if not urine by noon. VSS on air Vo, medications administered per [facility], patient denies further needs at this time. Fall precautions in place, hourly rounding, call light and belongings in reach, bed in lowest position, wheels locked in place, side rails up x 2, walkways free of clutter, bed alarm on.

## 2020-11-11 NOTE — PROGRESS NOTES
Up to chair at this time, patient has had three urine soaked briefs today. Moved over to high flow nasal cannula and tolerating well.

## 2020-11-11 NOTE — PROGRESS NOTES
[facility] Pulmonary and Critical Care    Follow Up Note    Subjective:   CHIEF COMPLAINT / HPI:   Chief Complaint   Patient presents with    Shortness of Breath     Pt brought by [facility] from home with CC of SOB that started Tuesday. Pt reports being dx'ed with COVID on Tuesday reports recent admission at [facility]. pt on RA 88%, 94%on 2L. Interval history: Patient making slow progress. Oxygen requirement slowly coming down. Continues on remdesivir and Decadron. Past Medical History:    Reviewed; no changes    Social History:    Reviewed; no changes    REVIEW OF SYSTEMS:    CONSTITUTIONAL:  negative for fevers and chills  RESPIRATORY:  See HPI  CARDIOVASCULAR:  negative for chest pain, palpitations, edema  GASTROINTESTINAL:  negative for nausea, vomiting, diarrhea, constipation and abdominal pain    Objective:   PHYSICAL EXAM:        VITALS:  /62   Pulse 68   Temp 96.5 °F (35.8 °C) (Temporal)   Resp 20   Ht 5' 4\" (1.626 m)   Wt 170 lb 6.4 oz (77.3 kg)   SpO2 93%   BMI 29.25 kg/m²  on heated high flow 100% and 60 L/min     24HR INTAKE/OUTPUT:      Intake/Output Summary (Last 24 hours) at 11/11/2020 1314  Last data filed at 11/11/2020 4052  Gross per 24 hour   Intake 420 ml   Output 1125 ml   Net -705 ml       PHYSICAL EXAM:     In-person bedside physical examination deferred. Pursuant to the emergency declaration under the [address] [place] [address], [address] [place] [address] authority and the [employer] and [employer] Act, this clinical encounter was conducted to provide necessary medical care.    (Also consistent with new provisions and guidance offered by [facility] on March 18, 2020 in setting of COVID 19 outbreak and in order to preserve personal protective equipment in accordance with the flexibilities announced by CMS on March 30, 2020)   References: https://[facility]. [facility]/Portals/0/Resources/COVID-19/3_18%20Telemed%20Guidance%20Updated%20March%2018. pdf?zyr=[email or web address]                      https://[facility]/Portals/0/Resources/COVID-19/3_18%20Telemed%20Guidance%20Updated%20March%2018. pdf?zqr=[email or web address]                      [email or web address] pdf                            General: Awake and oriented to time place and person according to primary service, vitals reviewed  HEENT: Deferred  Cardiovascular: Telemetry data reviewed, rest deferred   Pulmonary: deferred  Abdomen/GI: deferred  Neuro: deferred  Skin: deferred  Musculoskeletal:  deferred  Genitourinary: Deferred  Psych: deferred  Lymphatic/Immunologic: deferred      DATA:    CBC:  Recent Labs     11/10/20  0455   WBC 8.1   RBC 4.33   HGB 13.2   HCT 39.5      MCV 91.3   MCH 30.5   MCHC 33.4   RDW 13.3      BMP:  Recent Labs     11/10/20  0455 11/11/20  0510    138   K 4.4 4.2    106   CO2 25 24   BUN 23* 28*   CREATININE <0.5* 0.5*   CALCIUM 8.8 8.7   GLUCOSE 136* 101*      ABG:  No results for input(s): PHART, OLH0GUU, PO2ART, ZEC5JWQ, V7NTKZXM, BEART in the last 72 hours. Cultures:    Abx:    Radiology Review:  Pertinent images / reports were reviewed as a part of this visit. Assessment:     1. Acute hypoxemic respiratory failure  2. SARS-CoV-2 pneumonia  3. ARDS    I have reviewed laboratories, medical records and images for this visit  Chest imaging shows diffuse infiltrates consistent with Covid pneumonia  Oxygen requirement has decreased to heated high flow with FiO2 47 % and flow 20 L/min  Saturations remain in the high 90s  Continue to wean oxygen as tolerated to keep SPO2 above 89%  He has finished a course of remdesivir. Now on Decadron 10 mg IV daily. Will simona plan to change it to p.o. prednisone. Inflammatory markers are trending down. D-dimer is up to 2000. Will recheck.   Now on full dose Lovenox  Encourage patient to take p.o. diet. PT/OT. Out of bed into chair.           Mouna Boogie MD   Pulmonary Critical Care and Sleep Medicine  [facility] AT [place]   [address]  10/31/2020, 1:14 PM

## 2020-11-11 NOTE — PLAN OF CARE
Problem: Falls - Risk of:  Goal: Will remain free from falls  Description: Will remain free from falls  Outcome: Ongoing   Fall precautions in place, hourly rounding, call light and belongings in reach, bed in lowest position, wheels locked in place, side rails up x 2, walkways free of clutter, bed alarm on. Problem: Gas Exchange - Impaired  Goal: Absence of hypoxia  Outcome: Ongoing   On heated high flow oxygen, tolerating well, will titrate down as tolerated. Problem:  Body Temperature -  Risk of, Imbalanced  Goal: Ability to maintain a body temperature within defined limits  Outcome: Ongoing   Remains afebrile today

## 2020-11-11 NOTE — PROGRESS NOTES
Handoff report received and patient assessment completed. A&O x4, sitting up in chair. Pt is crying out in 10/10 pain to bladder and urethra. C/O pain to catheter site and bladder throughout day today, and despite flushing/manipulation by previous shift, the pain is progressively worsening. This is ibarra day 11. Pt states she is prone to UTI's and has them frequently. Urine is peach colored with sediment. Ibarra d/c at this time with immediate relief in pain. Lung sounds clear with diminished bases bilaterally, nonproductive cough present. Sao2 maintained on airvo @ 25L 60%. SR per monitor with infrequent multifocal PVC. Pt assisted back to bed. POC discussed and all questions addressed. Denies any further needs. Will cont to monitor.

## 2020-11-11 NOTE — PROGRESS NOTES
Infectious Diseases   Progress Note      Admission Date: 10/31/2020  Hospital Day: Hospital Day: 12   Attending: Rhea Moran MD  Date of service: 11/11/2020     Chief complaint/ Reason for consult:     · Acute respiratory failure with hypoxia  · COVID-19 pneumonia  · Elevated D-dimer  · Elevated CRP in setting of COVID-19  · Leukopenia in setting of COVID-19    Microbiology:        I have reviewed allavailable micro lab data and cultures    Blood culture (2/2) - collected on 10/31/2020: Negative so far    Antibiotics and immunizations:       Current antibiotics: All antibiotics and their doses were reviewed by me    Recent Abx Admin      No antibiotic orders with administrations found. Immunization History: All immunization history was reviewed by me today. There is no immunization history on file for this patient. Known drug allergies: All allergies were reviewed and updated    No Known Allergies    Social history:     Social History:  All social andepidemiologic history was reviewed and updated by me today as needed. · Tobacco use:   reports that she has quit smoking. She has never used smokeless tobacco.  · Alcohol use:   reports current alcohol use. · Currently lives in[address] [place]  has no history on file for drug. Assessment:     The patient is a 67 y.o. old female who  has a past medical history of Back pain, Cancer ([address] Essential hypertension, Headache, Hypothyroidism, Migraines, Thyroid disease, and UTI (lower urinary tract infection). with following problems:    · Acute respiratory failure with hypoxia-   on 25 L oxygen via high flow nasal cannula  · COVID-19 pneumonia-v ongoing  · Elevated D-dimer-monitor trends  · Elevated CRP in setting of COVID-19-monitor trends   · Leukopenia in setting of COVID-19-resolved   · Essential hypertension- BP Ok  · Hypothyroidism  · Ex-smoker  · Overweight* due to excess calorie intake :  Body mass index is 29.25 kg/m². Discussion:      She has completed a 10-day course of IV remdesivir yesterday. She is on 40 L oxygen via high flow nasal cannula today. D-dimer is 883. Serum creatinine 0.5. LDH is 283. Urinalysis from today idid not indicate any UTI. Plan:     Diagnostic Workup:    · Continue to monitor LDH, D-dimer, CRP, procalcitonin every other day  · Continue to follow  fever curve, WBC count and blood cultures  · Follow up on blood counts, liver and renal function      Antimicrobials:    · She has completed a 10-day course of remdesivir. · No literature available indicating use of remdesivir beyond 10 days in COVID-19  · Continue Decadron daily, may consider increasing the dose back to 20 mg daily  · Encourage frequent change in postures and awake prone positioning as tolerated. · Cough and deep breathing exercises. · Anticoagulation per primary and pulmonary. · Continue to monitor his vitals closely. · Continue supplemental oxygen/high flow/BiPAP as needed to oxygen saturation above 92%. · Maintain good glycemic control. · Continue to watch for new or worsening fever or productive cough or other signs of secondary bacterial infection. · Fall and aspiration precautions. · COVID-19 isolation precautions. · Continue close monitoring in the COVID-19 unit. Remains at high risk of complications. · Ongoing high oxygen requirement is quite concerning  · Discussed all above with RN      Drug Monitoring:    · Continue monitoring for antimicrobial agent toxicity as follows: CBC, CMP  · Continue to watch for following: new or worsening fever, new hypotension, hives, lip swelling and redness or purulence at vascular access sites. I/v access Management:    · Continue to monitor i.v access sites for erythema, induration, discharge or tenderness. · As always, continue efforts to minimize tubes/lines/drains as clinically appropriate to reduce chances of line associated infections.     Level of complexity of visit: High     Risk of Complications/Morbidity: High       Thank you for involving me in the care of your patient. I will continue to follow. If you have anyadditional questions, please do not hesitate to contact me. Subjective: Interval history: Interval history was obtained from chart review and RN. She is completing a 10-day course of IV remdesivir. She is afebrile. She is on 40 L oxygen via high flow nasal cannula today    REVIEW OF SYSTEMS:        Review of Systems   Unable to perform ROS: Acuity of condition             Past Medical History: All past medical history reviewed today. 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 Past Surgical History: All past surgical history was reviewed today. Past Surgical History:   Procedure Laterality Date    BLADDER SURGERY  08/2016    restructured bladder     MOHS SURGERY  01/10/2019    right jawline anterior inferior       Family History: All family history was reviewed today. Problem Relation Age of Onset    Heart Disease Daughter        Objective:       PHYSICAL EXAM:      Vitals:   Vitals:    11/11/20 1145 11/11/20 1200 11/11/20 1205 11/11/20 1210   BP:  (!) 92/48 100/62    Pulse: 61 68     Resp:  20  20   Temp:  96.5 °F (35.8 °C)     TempSrc:  Temporal     SpO2: 92% 91%  93%   Weight:       Height:           Physical Exam       PHYSICAL EXAM:     In-person bedside physical examination deferred. Pursuant to the emergency declaration under the [facility], [address] [place] [address] authority and the [employer] Act, this clinical encounter was conducted to provide necessary medical care.    (Also consistent with new provisions and guidance offered by [facility] on March 18, 2020 in setting of COVID 19 outbreak and in order to preserve personal protective equipment in accordance with the flexibilities announced by CMS on March 30, 2020)   References: https://[facility]. [facility]/Portals/0/Resources/COVID-19/3_18%20Telemed%20Guidance%20Updated%20March%2018. pdf?pts=[email or web address]                      https://[facility]/Portals/0/Resources/COVID-19/3_18%20Telemed%20Guidance%20Updated%20March%2018. pdf?uwi=[email or web address]                      [email or web address] pdf                            General: Awake and oriented to time place and person according to primary service, vitals reviewed  HEENT: Deferred  Cardiovascular: Telemetry data reviewed, rest deferred   Pulmonary: deferred  Abdomen/GI: deferred  Neuro: deferred  Skin: deferred  Musculoskeletal:  deferred  Genitourinary: Deferred  Psych: deferred  Lymphatic/Immunologic: deferred    Intake and output:    I/O last 3 completed shifts: In: 5 [P.O.:540]  Out: 1 [Urine:475]    Lab Data:   All available labs and old records have been reviewed by me. CBC:  Recent Labs     11/10/20  0455   WBC 8.1   RBC 4.33   HGB 13.2   HCT 39.5      MCV 91.3   MCH 30.5   MCHC 33.4   RDW 13.3        BMP:  Recent Labs     11/10/20  0455 11/11/20  0510    138   K 4.4 4.2    106   CO2 25 24   BUN 23* 28*   CREATININE <0.5* 0.5*   CALCIUM 8.8 8.7   GLUCOSE 136* 101*        Hepatic Function Panel:   Lab Results   Component Value Date    ALKPHOS 70 11/05/2020    ALT 35 11/05/2020    AST 19 11/05/2020    PROT 5.7 11/05/2020    BILITOT 0.5 11/05/2020    LABALBU 2.6 11/05/2020       CPK: No results found for: CKTOTAL  ESR: No results found for: SEDRATE  CRP:   Lab Results   Component Value Date    CRP 94.2 (H) 11/09/2020           Imaging:     All pertinent images and reports for the current visit were reviewed by me during this visit. XR CHEST PORTABLE   Final Result   Right upper extremity PICC line extends to expected location of cavoatrial   junction. No significant interval change in multifocal bilateral airspace disease as   compared to prior. XR CHEST PORTABLE   Final Result   Unchanged chest over the past 3 days with diffuse pulmonary disease, favored   to be related to pneumonia         XR CHEST PORTABLE   Final Result   Multifocal airspace opacities, slightly progressed on the right. XR CHEST PORTABLE   Final Result   No acute interval change. Bilateral airspace disease is similar to prior   exam.         XR CHEST PORTABLE   Final Result   Multifocal ground-glass opacification likely pneumonia including atypical   viral pneumonia. Pulmonary edema is a less likely etiology for the findings. Medications: All current and past medications were reviewed.      dexamethasone  10 mg Intravenous Daily    enoxaparin  1 mg/kg Subcutaneous BID    lidocaine 1 % injection  5 mL Intradermal Once    sodium chloride flush  10 mL Intravenous 2 times per day    levothyroxine  112 mcg Oral Daily    venlafaxine  225 mg Oral Daily    aspirin  81 mg Oral Daily    calcium elemental  500 mg Oral Daily    sodium chloride flush  10 mL Intravenous 2 times per day    albuterol sulfate HFA  2 puff Inhalation 4x daily    And    ipratropium  2 puff Inhalation 4x daily           sodium chloride flush, calcium carbonate, lip balm petroleum free, sodium chloride, albuterol sulfate HFA, ALPRAZolam, sodium chloride flush, acetaminophen **OR** acetaminophen, magnesium hydroxide, promethazine **OR** ondansetron, sodium chloride, potassium chloride **OR** potassium alternative oral replacement **OR** potassium chloride, labetalol      Problem list:       Patient Active Problem List   Diagnosis Code    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 Although every effort was made to ensure the accuracy of this automated transcription, some errors in transcription may have occurred inadvertently. If you may need any clarification, please do not hesitate to contact me through EPIC or at the phone number provided below with my electronic signature. Any pictures or media included in this note were obtained after taking informed verbal consent from the patient and with their approval to include those in the patient's medical record.     Ethan Weaver MD, MPH  11/11/2020 , 4:43 PM   [facility] Infectious Disease   [address] [place] [address], [address] [place] [address]  Office: [phone]  Fax: [phone]  Clinic days:  Tuesday & Thursday

## 2020-11-12 LAB
ANION GAP SERPL CALCULATED.3IONS-SCNC: 8 MMOL/L (ref 3–16)
BUN BLDV-MCNC: 25 MG/DL (ref 7–20)
CALCIUM SERPL-MCNC: 8.5 MG/DL (ref 8.3–10.6)
CHLORIDE BLD-SCNC: 104 MMOL/L (ref 99–110)
CO2: 25 MMOL/L (ref 21–32)
CREAT SERPL-MCNC: <0.5 MG/DL (ref 0.6–1.2)
GFR AFRICAN AMERICAN: >60
GFR NON-AFRICAN AMERICAN: >60
GLUCOSE BLD-MCNC: 89 MG/DL (ref 70–99)
POTASSIUM SERPL-SCNC: 4.3 MMOL/L (ref 3.5–5.1)
SODIUM BLD-SCNC: 137 MMOL/L (ref 136–145)

## 2020-11-12 PROCEDURE — 6360000002 HC RX W HCPCS: Performed by: INTERNAL MEDICINE

## 2020-11-12 PROCEDURE — 6370000000 HC RX 637 (ALT 250 FOR IP): Performed by: INTERNAL MEDICINE

## 2020-11-12 PROCEDURE — 97530 THERAPEUTIC ACTIVITIES: CPT

## 2020-11-12 PROCEDURE — 97162 PT EVAL MOD COMPLEX 30 MIN: CPT

## 2020-11-12 PROCEDURE — 99232 SBSQ HOSP IP/OBS MODERATE 35: CPT | Performed by: INTERNAL MEDICINE

## 2020-11-12 PROCEDURE — 94761 N-INVAS EAR/PLS OXIMETRY MLT: CPT

## 2020-11-12 PROCEDURE — 97166 OT EVAL MOD COMPLEX 45 MIN: CPT

## 2020-11-12 PROCEDURE — 1200000000 HC SEMI PRIVATE

## 2020-11-12 PROCEDURE — 2700000000 HC OXYGEN THERAPY PER DAY

## 2020-11-12 PROCEDURE — 2580000003 HC RX 258: Performed by: INTERNAL MEDICINE

## 2020-11-12 PROCEDURE — 94640 AIRWAY INHALATION TREATMENT: CPT

## 2020-11-12 PROCEDURE — 80048 BASIC METABOLIC PNL TOTAL CA: CPT

## 2020-11-12 PROCEDURE — 97535 SELF CARE MNGMENT TRAINING: CPT

## 2020-11-12 PROCEDURE — 99233 SBSQ HOSP IP/OBS HIGH 50: CPT | Performed by: INTERNAL MEDICINE

## 2020-11-12 RX ADMIN — IPRATROPIUM BROMIDE 2 PUFF: 17 AEROSOL, METERED RESPIRATORY (INHALATION) at 19:27

## 2020-11-12 RX ADMIN — Medication 500 MG: at 10:03

## 2020-11-12 RX ADMIN — IPRATROPIUM BROMIDE 2 PUFF: 17 AEROSOL, METERED RESPIRATORY (INHALATION) at 16:22

## 2020-11-12 RX ADMIN — LEVOTHYROXINE SODIUM 112 MCG: 0.11 TABLET ORAL at 06:16

## 2020-11-12 RX ADMIN — ALBUTEROL SULFATE 2 PUFF: 90 AEROSOL, METERED RESPIRATORY (INHALATION) at 12:15

## 2020-11-12 RX ADMIN — ENOXAPARIN SODIUM 80 MG: 80 INJECTION SUBCUTANEOUS at 10:03

## 2020-11-12 RX ADMIN — DEXAMETHASONE SODIUM PHOSPHATE 10 MG: 10 INJECTION, SOLUTION INTRAMUSCULAR; INTRAVENOUS at 10:03

## 2020-11-12 RX ADMIN — ALBUTEROL SULFATE 2 PUFF: 90 AEROSOL, METERED RESPIRATORY (INHALATION) at 07:17

## 2020-11-12 RX ADMIN — APIXABAN 5 MG: 5 TABLET, FILM COATED ORAL at 20:17

## 2020-11-12 RX ADMIN — VENLAFAXINE HYDROCHLORIDE 225 MG: 150 CAPSULE, EXTENDED RELEASE ORAL at 10:03

## 2020-11-12 RX ADMIN — IPRATROPIUM BROMIDE 2 PUFF: 17 AEROSOL, METERED RESPIRATORY (INHALATION) at 07:17

## 2020-11-12 RX ADMIN — Medication 10 ML: at 10:08

## 2020-11-12 RX ADMIN — Medication 10 ML: at 20:19

## 2020-11-12 RX ADMIN — ALBUTEROL SULFATE 2 PUFF: 90 AEROSOL, METERED RESPIRATORY (INHALATION) at 16:22

## 2020-11-12 RX ADMIN — ASPIRIN 81 MG: 81 TABLET, COATED ORAL at 10:03

## 2020-11-12 RX ADMIN — ALBUTEROL SULFATE 2 PUFF: 90 AEROSOL, METERED RESPIRATORY (INHALATION) at 19:27

## 2020-11-12 RX ADMIN — IPRATROPIUM BROMIDE 2 PUFF: 17 AEROSOL, METERED RESPIRATORY (INHALATION) at 12:15

## 2020-11-12 ASSESSMENT — ENCOUNTER SYMPTOMS
SHORTNESS OF BREATH: 1
TROUBLE SWALLOWING: 0
ABDOMINAL PAIN: 0
STRIDOR: 0
COLOR CHANGE: 0
COUGH: 1
CHOKING: 0
FACIAL SWELLING: 0
APNEA: 0
NAUSEA: 0
RHINORRHEA: 0
EYE DISCHARGE: 0
EYE REDNESS: 0
BLOOD IN STOOL: 0
PHOTOPHOBIA: 0

## 2020-11-12 ASSESSMENT — PAIN SCALES - GENERAL
PAINLEVEL_OUTOF10: 0

## 2020-11-12 NOTE — PROGRESS NOTES
[facility] Pulmonary and Critical Care    Follow Up Note    Subjective:   CHIEF COMPLAINT / HPI:   Chief Complaint   Patient presents with    Shortness of Breath     Pt brought by [facility] from home with CC of SOB that started Tuesday. Pt reports being dx'ed with COVID on Tuesday reports recent admission at [facility]. pt on RA 88%, 94%on 2L. Interval history: Patient continues to make slow progress. Oxygen requirements have come down to 4 L/min with patient saturating 93%. Finished remdesivir course. Continues on Decadron. Past Medical History:    Reviewed; no changes    Social History:    Reviewed; no changes    REVIEW OF SYSTEMS:    CONSTITUTIONAL:  negative for fevers and chills  RESPIRATORY:  See HPI  CARDIOVASCULAR:  negative for chest pain, palpitations, edema  GASTROINTESTINAL:  negative for nausea, vomiting, diarrhea, constipation and abdominal pain    Objective:   PHYSICAL EXAM:        VITALS:  BP (!) 96/57   Pulse 72   Temp 97 °F (36.1 °C) (Temporal)   Resp 25   Ht 5' 4\" (1.626 m)   Wt 165 lb (74.8 kg)   SpO2 97%   BMI 28.32 kg/m²  on heated high flow 100% and 60 L/min     24HR INTAKE/OUTPUT:      Intake/Output Summary (Last 24 hours) at 11/12/2020 1526  Last data filed at 11/12/2020 1313  Gross per 24 hour   Intake 720 ml   Output --   Net 720 ml       PHYSICAL EXAM:     In-person bedside physical examination deferred. Pursuant to the emergency declaration under the [facility][note 3][facility], [facility] authority and the [employer] Act, this clinical encounter was conducted to provide necessary medical care.    (Also consistent with new provisions and guidance offered by [facility] on March 18, 2020 in setting of COVID 19 outbreak and in order to preserve personal protective equipment in accordance with the flexibilities announced by CMS on March 30, 2020)   References: https://[facility]. [facility]/Portals/0/Resources/COVID-19/3_18%20Telemed%20Guidance%20Updated%20March%2018. pdf?epc=[email or web address]                      https://[facility]/Portals/0/Resources/COVID-19/3_18%20Telemed%20Guidance%20Updated%20March%2018. pdf?xmz=[email or web address]                      http://[email or web address] pdf                            General: Awake and oriented to time place and person according to primary service, vitals reviewed  HEENT: Deferred  Cardiovascular: Telemetry data reviewed, rest deferred   Pulmonary: deferred  Abdomen/GI: deferred  Neuro: deferred  Skin: deferred  Musculoskeletal:  deferred  Genitourinary: Deferred  Psych: deferred  Lymphatic/Immunologic: deferred      DATA:    CBC:  Recent Labs     11/10/20  0455   WBC 8.1   RBC 4.33   HGB 13.2   HCT 39.5      MCV 91.3   MCH 30.5   MCHC 33.4   RDW 13.3      BMP:  Recent Labs     11/10/20  0455 11/11/20  0510 11/12/20  0450    138 137   K 4.4 4.2 4.3    106 104   CO2 25 24 25   BUN 23* 28* 25*   CREATININE <0.5* 0.5* <0.5*   CALCIUM 8.8 8.7 8.5   GLUCOSE 136* 101* 89      ABG:  No results for input(s): PHART, BXJ9ANH, PO2ART, AAU2WUL, I5QCVBJT, BEART in the last 72 hours. Cultures:    Abx:    Radiology Review:  Pertinent images / reports were reviewed as a part of this visit. Assessment:     1. Acute hypoxemic respiratory failure  2. SARS-CoV-2 pneumonia  3. ARDS    I have reviewed laboratories, medical records and images for this visit  Chest imaging shows diffuse infiltrates consistent with Covid pneumonia  Oxygen requirement has decreased to 4 L/min. Saturations remain in the high 90s  Continue to wean oxygen as tolerated to keep SPO2 above 89%  She has finished a course of remdesivir  IV Decadron changed to 6 mg IV daily. By tomorrow can be changed to prednisone p.o. daily. Inflammatory markers are trending down. D-dimer has come down.   Transition patient to Eliquis 5 mg p.o. twice daily. Patient will need to stay on this anticoagulation for at least 2 weeks. Encourage patient to take p.o. diet. PT/OT. Out of bed into chair. From pulmonary standpoint patient can go to [facility].           John Harvey MD   Pulmonary Critical Care and Sleep Medicine  [facility] AT [facility], [place]ze [place], [address]  10/31/2020, 3:26 PM

## 2020-11-12 NOTE — PROGRESS NOTES
Reassessment complete and documented. Patient currently up in chair and on 4L, O2 sat 96%. Call light in reach, chair alarm on.

## 2020-11-12 NOTE — PROGRESS NOTES
Reassessment completed. Pt on 10 L per HFNC. Respirations even and easy. Denies pain or discomfort. Call bell in reach.

## 2020-11-12 NOTE — PROGRESS NOTES
Assessment completed. Pt on 13L HFNC. Becomes SOB upon exertion. Transferred from chair to bed using walker with limited assist. Incontinent of B&B. Moderate assist with lulú care. Pt noted with frequent unifocal PVCs after ambulation. Denies pain/discomfort. Call bell in reach.

## 2020-11-12 NOTE — PROGRESS NOTES
Physical Therapy    Facility/Department: [facility]  Initial Assessment    NAME: Jimy Sorensen  : 1940  MRN: [id number]    Date of Service: 2020    Discharge Recommendations: Jimy Sorensen scored a 15/24 on the AM-PAC short mobility form. Current research shows that an AM-PAC score of 17 or less is typically not associated with a discharge to the patient's home setting. Based on the patient's AM-PAC score and their current functional mobility deficits, it is recommended that the patient have 3-5 sessions per week of Physical Therapy at d/c to increase the patient's independence. Please see assessment section for further patient specific details. If patient discharges prior to next session this note will serve as a discharge summary. Please see below for the latest assessment towards goals. PT Equipment Recommendations  Equipment Needed: Yes  Mobility Devices: Wheelchair  Wheelchair: Light Weight  Other: The pt will need a w/c if she discharges home with family. She is unable to ambulate a household distance due to SOB and hypoxia. Assessment   Body structures, Functions, Activity limitations: Decreased balance;Decreased endurance  Assessment: The pt presents with decreased standing balance and activity tolerance due to SOB from COVID-19. She was hypoxic on 6L/min of O2 during bed to chair transfer today. She was unable to ambulate a household distance. She is also incontinent of stool. Prognosis: Good  Decision Making: Medium Complexity  PT Education: Goals;PT Role;Plan of Care;General Safety;Transfer Training;Gait Training  Patient Education: DC recommendations, pt verbalized understanding  Barriers to Learning: none  REQUIRES PT FOLLOW UP: Yes  Activity Tolerance  Activity Tolerance: Treatment limited secondary to medical complications (free text); Patient Tolerated treatment well(limited by SOB)  Activity Tolerance: Difficulty to determine accurate O2 sat during mobility as pt's pulse ox wasnt reading her HR accurately, O2 sat appeared to vary between 78 and 90% on 6L/min of O2 with mobility, O2 sat was 98% at rest       Patient Diagnosis(es): The primary encounter diagnosis was COVID-19 virus infection. Diagnoses of Hypoxia, Fever in other diseases, and Pneumonia due to organism were also pertinent to this visit. has a past medical history of Back pain, Cancer ([address] Essential hypertension, Headache, Hypothyroidism, Migraines, Thyroid disease, and UTI (lower urinary tract infection). has a past surgical history that includes Bladder surgery (08/2016) and Mohs surgery (01/10/2019). Restrictions  Restrictions/Precautions  Restrictions/Precautions: Fall Risk(HIGH FALL RISK; DIET GENERAL)  Position Activity Restriction  Other position/activity restrictions: 67 y.o. 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 Patient says she initially started getting sick on 10/11/2020 with having progressive shortness of air. She was admitted to [facility] for 3 days was discharged out without any medications. Patient says she has been attempting to manage her symptoms at home without any relief. Said that she did have a fever of 101 this morning and has not taken any antipyretics 4 hours prior to arrival.  Her daughter did note that she sat up for approximately 45 minutes yesterday and became very fatigued and had to lay down. She has had decreased activity due to her dyspnea on exertion. Says she has had decreased p.o. intake doing to feeling very fatigued and short of air. denies having home O2. She is currently on 4L 02 in ER here.  Will need admission for acute resp failure  Vision/Hearing  Vision: Impaired  Vision Exceptions: Wears glasses for reading  Hearing: Within functional limits Subjective  General  Chart Reviewed: Yes  Family / Caregiver Present: No  Diagnosis: COVID-19  Follows Commands: Within Functional Limits  General Comment  Comments: pt was supine in bed upon PT arrival, agreeable to PT  Subjective  Subjective: pt denied pain but indicated pressure in her chest when she was having difficulty breathing, pt stated she overall \"was having a hard time today\"  Pain Screening  Patient Currently in Pain: Denies          Orientation  Orientation  Overall Orientation Status: Within Functional Limits  Social/Functional History  Social/Functional History  Lives With: Alone  Type of Home: House  Home Layout: Two level, Able to Live on Main level with bedroom/bathroom  Home Access: Stairs to enter with rails  Entrance Stairs - Number of Steps: 2 STEPS  Entrance Stairs - Rails: Right  Bathroom Shower/Tub: Walk-in shower  Bathroom Toilet: Standard  ADL Assistance: Independent  Homemaking Assistance: Independent  Homemaking Responsibilities: Yes  Ambulation Assistance: Independent  Transfer Assistance: Independent  Active : Yes  Occupation: Retired  Leisure & Hobbies: spend time with friends  Additional Comments: pt reports no falls in the last 6 months    Objective    AROM RLE (degrees)  RLE AROM: WFL  AROM LLE (degrees)  LLE AROM : WFL  Strength RLE  Strength RLE: WFL  Strength LLE  Strength LLE: WFL  Tone RLE  RLE Tone: Normotonic  Tone LLE  LLE Tone: Normotonic  Motor Control  Gross Motor?: WFL  Sensation  Overall Sensation Status: WFL  Bed mobility  Supine to Sit: Stand by assistance(increased time)  Sit to Supine: Minimal assistance  Scooting: Stand by assistance  Transfers  Sit to Stand: Minimal Assistance  Stand to sit: Minimal Assistance  Ambulation  Ambulation?: Yes  Ambulation 1  Surface: level tile  Device: Rolling Walker  Assistance: Contact guard assistance  Gait Deviations: Slow Romana;Decreased step length  Distance: 5' x 2  Comments: to/from [facility]     Balance  Posture: Good  Sitting - Static: Good  Sitting - Dynamic: Fair(pt was unable to reach to her feet to don her brief)  Standing - Static: Fair;-(CGA with RW)  Standing - Dynamic: Fair;-  Comments: The pt stood twice for assistance with pericare with RW and CGA. Each stand was around 1 minute. Pt then became too SOB and fatigued to stand longer. Plan   Plan  Times per week: 3-5  Times per day: Daily  Current Treatment Recommendations: Strengthening, ROM, Balance Training, Functional Mobility Training, Transfer Training, Endurance Training, Gait Training, Neuromuscular Re-education, Safety Education & Training  Safety Devices  Type of devices: All fall risk precautions in place, Call light within reach, Bed alarm in place, Nurse notified, Left in bed      AM-PAC Score  AM-PAC Inpatient Mobility Raw Score : 15 (11/12/20 1549)  AM-PAC Inpatient T-Scale Score : 39.45 (11/12/20 1549)  Mobility Inpatient CMS 0-100% Score: 57.7 (11/12/20 1549)  Mobility Inpatient CMS G-Code Modifier : CK (11/12/20 1549)          Goals  Short term goals  Time Frame for Short term goals:  To be met prior to DC  Short term goal 1: Pt will perform bed mobility with mod I  Short term goal 2: Pt will perform sit to/from stand transfers with SBA  Short term goal 3: Pt will ambulate 36' with RW and CGA  Patient Goals   Patient goals : return home with family's assistance       Therapy Time   Individual Concurrent Group Co-treatment   Time In 0958         Time Out 1052         Minutes 54         Timed Code Treatment Minutes: [address] [place], [place] DPT [id number]

## 2020-11-12 NOTE — PROGRESS NOTES
Occupational Therapy   Occupational Therapy Initial Assessment  Date: 2020   Patient Name: Betty Malik  MRN: [id number]     : 1940    Date of Service: 2020    Discharge Recommendations:    Betty Malik scored a 14/24 on the AM-PAC ADL Inpatient form. Current research shows that an AM-PAC score of 17 or less is typically not associated with a discharge to the patient's home setting. Based on the patient's AM-PAC score and their current ADL deficits, it is recommended that the patient have 3-5 sessions per week of Occupational Therapy at d/c to increase the patient's independence. Please see assessment section for further patient specific details. If patient discharges prior to next session this note will serve as a discharge summary. Please see below for the latest assessment towards goals. Should pt refuse therapy recommendations and d/c home:  [address] [place] [address]: LEVEL 3 SAFETY     - Initial home health evaluation to occur within 24-48 hours, in patient home   - Therapy evaluations in home within 24-48 hours of discharge; including DME and home safety   - Frontload therapy 5 days, then 3x a week   - Therapy to evaluate if patient has [address] needs for personal care   -  evaluation within 24-48 hours, includes evaluation of resources and insurance to determine [place], [place], LTC, and Medicaid options     OT Equipment Recommendations  Equipment Needed: Yes  Other: If pt declines OT recommendations and returns home pt would benefit from [facility] and shower chair. Assessment   Performance deficits / Impairments: Decreased functional mobility ; Decreased ADL status; Decreased strength;Decreased endurance;Decreased high-level IADLs  Assessment: Pt is currently functioning below occupational baseline and demo the deficits listed above, pt would benefit from continued skilled OT services to address these deficits and increase independence, safety, and ease with all occupational pursuits. Treatment Diagnosis: Decreased ADL/IADL status, functional mobility, and functional transfers d/t Acute respiratory failure due to COVID-19 [facility])  Prognosis: Good;Fair  Decision Making: Medium Complexity  OT Education: OT Role;Plan of Care;Transfer Training;Energy Conservation  Patient Education: d/c planning- pt verbalized understanding  REQUIRES OT FOLLOW UP: Yes  Activity Tolerance  Activity Tolerance: Patient Tolerated treatment well;Patient limited by fatigue  Activity Tolerance: supine BP 94/57 HR 65, 02 96% 6L. Pt dropped to high 70's low 80's with mobility and standing tasks requiring prolonged seated rest breaks to recover to above 90%. Safety Devices  Safety Devices in place: Yes  Type of devices: All fall risk precautions in place; Patient at risk for falls; Bed alarm in place; Left in bed;Call light within reach;Nurse notified           Patient Diagnosis(es): The primary encounter diagnosis was COVID-19 virus infection. Diagnoses of Hypoxia, Fever in other diseases, and Pneumonia due to organism were also pertinent to this visit. has a past medical history of Back pain, Cancer ([address] Essential hypertension, Headache, Hypothyroidism, Migraines, Thyroid disease, and UTI (lower urinary tract infection). has a past surgical history that includes Bladder surgery (08/2016) and Mohs surgery (01/10/2019). Treatment Diagnosis: Decreased ADL/IADL status, functional mobility, and functional transfers d/t Acute respiratory failure due to COVID-19 [facility])      Restrictions  Restrictions/Precautions  Restrictions/Precautions: Fall Risk(HIGH FALL RISK; DIET GENERAL)  Position Activity Restriction  Other position/activity restrictions: 67 y.o. 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   Patient says she initially started getting sick on 10/11/2020 with having progressive shortness of air. She was admitted to [facility] for 3 days was discharged out without any medications. Patient says she has been attempting to manage her symptoms at home without any relief. Said that she did have a fever of 101 this morning and has not taken any antipyretics 4 hours prior to arrival.  Her daughter did note that she sat up for approximately 45 minutes yesterday and became very fatigued and had to lay down. She has had decreased activity due to her dyspnea on exertion. Says she has had decreased p.o. intake doing to feeling very fatigued and short of air. denies having home O2. She is currently on 4L 02 in ER here. Will need admission for acute resp failure    Subjective   General  Chart Reviewed:  Yes  Additional Pertinent Hx: PMH: pancreatic cyst, seborrheic keratosis, rosacea, squamous cell carcinoma and basal cell carcinoma, migraines, hypothyroidism  Family / Caregiver Present: No  Referring Practitioner: Neris Sterling MD  Diagnosis: Acute respiratory failure due to COVID-19 ([facility] [address]  Subjective  Subjective: Pt supine in bed upon arrival, RN present administering medication, agreeable to OT/PT evaluation  Patient Currently in Pain: Denies  Vital Signs  Patient Currently in Pain: Denies    Social/Functional History  Social/Functional History  Lives With: Alone  Type of Home: House  Home Layout: Two level, Able to Live on Main level with bedroom/bathroom  Home Access: Stairs to enter with rails  Entrance Stairs - Number of Steps: 2 STEPS  Entrance Stairs - Rails: Right  Bathroom Shower/Tub: Walk-in shower  Bathroom Toilet: Standard  ADL Assistance: Independent  Homemaking Assistance: Independent  Homemaking Responsibilities: Yes  Ambulation Assistance: Independent  Transfer Assistance: Independent  Active : Yes  Occupation: Retired  Leisure & Hobbies: spend time with friends  Additional Comments: pt reports no falls in the last 6 months       Objective   Vision: Impaired  Vision Exceptions: Wears glasses for reading  Hearing: Within functional limits    Orientation  Overall Orientation Status: Within Normal Limits     Balance  Sitting Balance: Stand by assistance  Standing Balance: Contact guard assistance  Standing Balance  Time: 5 minutes total  Activity: functional mobility, functional transfers, ADL completion  Comment: use of RW, decreased standing and activity tolerance requiring frequent seated rest breaks  Functional Mobility  Functional - Mobility Device: Rolling Walker  Activity: Other(stand step transfer x2)  Assist Level: Contact guard assistance  Functional Mobility Comments: decreased endurance after completing stand step transfer, pt fatigues easily with mobility sp02% decreasing to low 80's. 02 recovers to above 90 with seated rest break. Toilet Transfers  Toilet - Technique: Stand step  Equipment Used: Standard bedside commode  Toilet Transfer: Contact guard assistance  ADL  Grooming: Setup;Stand by assistance(combed hair and washed face while seated on BSC)  UE Bathing: Minimal assistance;Setup; Increased time to complete(washed hair; min(A) provided for thoroughness)  LE Bathing: Dependent/Total;Increased time to complete(lulú care this date in stance; redness and pain noted while wiping and paste applied to bottom. RN aware.)  UE Dressing: Setup; Increased time to complete(gown change)  LE Dressing: Maximum assistance; Increased time to complete(able to deonte RLE into brief, however, required assistance for LLE and to bring pants fully over hips)  Toileting: Dependent/Total(incontinent of BM)  Additional Comments: Decreased endurance and activity tolerance noted this date requiring increased time to complete all ADLs this date  Tone RUE  RUE Tone: Normotonic  Tone LUE  LUE Tone: Normotonic  Coordination  Movements Are Fluid And Coordinated: Yes     Bed mobility  Supine to Sit: Stand by assistance(increased time)  Sit to Supine: Minimal assistance  Scooting: Stand by assistance  Transfers  Stand Step Transfers: Contact guard assistance  Sit to stand: Contact guard assistance  Stand to sit: Contact guard assistance  Transfer Comments: verbal cueing for safe hand placement     Cognition  Overall Cognitive Status: WFL  Perception  Overall Perceptual Status: WFL     Sensation  Overall Sensation Status: WFL        LUE AROM (degrees)  LUE AROM : WFL  Left Hand AROM (degrees)  Left Hand AROM: WFL  RUE AROM (degrees)  RUE AROM : WFL  Right Hand AROM (degrees)  Right Hand AROM: WFL                      Plan   Plan  Times per week: 3-5x/wk  Times per day: Daily  Current Treatment Recommendations: Strengthening, Balance Training, Functional Mobility Training, Endurance Training, Safety Education & Training, Patient/Caregiver Education & Training, Self-Care / ADL    G-Code     OutComes Score                                                  AM-PAC Score        AM-[facility] Inpatient Daily Activity Raw Score: 14 (11/12/20 1549)  AM-PAC Inpatient ADL T-Scale Score : 33.39 (11/12/20 1549)  ADL Inpatient CMS 0-100% Score: 59.67 (11/12/20 1549)  ADL Inpatient CMS G-Code Modifier : CK (11/12/20 1549)    Goals  Short term goals  Time Frame for Short term goals: d/c  Short term goal 1: toileting with supervision  Short term goal 2: LB ADLs with supervision, UB ADLs with setup  Short term goal 3: Functional transfers with supervision  Short term goal 4: Functional mobility to<>from bathroom with use of RW and supervision  Short term goal 5: Pt will tolerate 4 minutes in stance while completing functional task with supervision  Long term goals  Time Frame for Long term goals : LTG=STG  Patient Goals   Patient goals : to return home       Therapy Time   Individual Concurrent Group Co-treatment   Time In 0958         Time Out 1052         Minutes 54         Timed Code Treatment Minutes: [address]       [address] [facility], OT   Glenn Quinones Zieverink, OTR/L

## 2020-11-12 NOTE — PROGRESS NOTES
Reassessment complete, no changes. Call light in reach. Bed in low position, wheels locked and alarm on.

## 2020-11-12 NOTE — PROGRESS NOTES
Shift assessment complete and documented. Patient denies any pain at this time. Currently on 8L high flow, O2 sat 97%. Call light in reach. Bed in low position, wheels locked, and bed alarm on.

## 2020-11-12 NOTE — PLAN OF CARE
Problem: Falls - Risk of:  Goal: Will remain free from falls  Description: Will remain free from falls  Outcome: Ongoing  Goal: Absence of physical injury  Description: Absence of physical injury  Outcome: Ongoing     Problem: Airway Clearance - Ineffective  Goal: Achieve or maintain patent airway  Outcome: Ongoing     Problem: Gas Exchange - Impaired  Goal: Absence of hypoxia  Outcome: Ongoing  Goal: Promote optimal lung function  Outcome: Ongoing     Problem: Breathing Pattern - Ineffective  Goal: Ability to achieve and maintain a regular respiratory rate  Outcome: Ongoing     Problem: Body Temperature -  Risk of, Imbalanced  Goal: Ability to maintain a body temperature within defined limits  Outcome: Ongoing  Goal: Will regain or maintain usual level of consciousness  Outcome: Ongoing  Goal: Complications related to the disease process, condition or treatment will be avoided or minimized  Outcome: Ongoing     Problem: Isolation Precautions - Risk of Spread of Infection  Goal: Prevent transmission of infection  Outcome: Ongoing     Problem: Nutrition Deficits  Goal: Optimize nutrtional status  Outcome: Ongoing     Problem: Risk for Fluid Volume Deficit  Goal: Maintain normal heart rhythm  Outcome: Ongoing  Goal: Maintain absence of muscle cramping  Outcome: Ongoing  Goal: Maintain normal serum potassium, sodium, calcium, phosphorus, and pH  Outcome: Ongoing     Problem: Loneliness or Risk for Loneliness  Goal: Demonstrate positive use of time alone when socialization is not possible  Outcome: Ongoing     Problem: Fatigue  Goal: Verbalize increase energy and improved vitality  Outcome: Ongoing     Problem: Patient Education: Go to Patient Education Activity  Goal: Patient/Family Education  Outcome: Ongoing     Problem: Pain:  Description: Pain management should include both nonpharmacologic and pharmacologic interventions.   Goal: Pain level will decrease  Description: Pain level will decrease  Outcome: Ongoing  Goal: Control of acute pain  Description: Control of acute pain  Outcome: Ongoing  Goal: Control of chronic pain  Description: Control of chronic pain  Outcome: Ongoing     Problem: Skin Integrity:  Goal: Will show no infection signs and symptoms  Description: Will show no infection signs and symptoms  Outcome: Ongoing  Goal: Absence of new skin breakdown  Description: Absence of new skin breakdown  Outcome: Ongoing     Problem: Musculor/Skeletal Functional Status  Goal: Highest potential functional level  Outcome: Ongoing  Goal: Absence of falls  Outcome: Ongoing

## 2020-11-12 NOTE — PROGRESS NOTES
injection  5 mL Intradermal Once    sodium chloride flush  10 mL Intravenous 2 times per day    levothyroxine  112 mcg Oral Daily    venlafaxine  225 mg Oral Daily    aspirin  81 mg Oral Daily    calcium elemental  500 mg Oral Daily    sodium chloride flush  10 mL Intravenous 2 times per day    albuterol sulfate HFA  2 puff Inhalation 4x daily    And    ipratropium  2 puff Inhalation 4x daily     PRN Meds: sodium chloride flush, calcium carbonate, lip balm petroleum free, sodium chloride, albuterol sulfate HFA, ALPRAZolam, sodium chloride flush, acetaminophen **OR** acetaminophen, magnesium hydroxide, promethazine **OR** ondansetron, sodium chloride, potassium chloride **OR** potassium alternative oral replacement **OR** potassium chloride, labetalol      Intake/Output Summary (Last 24 hours) at 11/12/2020 1524  Last data filed at 11/12/2020 1313  Gross per 24 hour   Intake 720 ml   Output --   Net 720 ml       Physical Exam Performed:    BP (!) 96/57   Pulse 72   Temp 97 °F (36.1 °C) (Temporal)   Resp 25   Ht 5' 4\" (1.626 m)   Wt 165 lb (74.8 kg)   SpO2 97%   BMI 28.32 kg/m²     General appearance: Appears ill but is not in respiratory distress  HEENT: Pupils equal, round, and reactive to light. Conjunctivae/corneas clear. Neck: Supple, with full range of motion. No jugular venous distention. Trachea midline. Respiratory:  Normal respiratory effort. Rales at the bases bilat. Cardiovascular: Regular rate and rhythm with normal S1/S2 without murmurs, rubs or gallops. Abdomen: Soft, non-tender, non-distended with normal bowel sounds. Musculoskeletal: No clubbing, cyanosis or edema bilaterally. Skin: Skin color, texture, turgor normal.  No rashes or lesions. Neurologic:  Neurovascularly intact without any focal sensory/motor deficits.  Cranial nerves: II-XII intact, grossly non-focal.  Psychiatric: Alert and oriented, thought content appropriate, normal insight  Capillary Refill: Brisk,< 3 seconds   Peripheral Pulses: +2 palpable, equal bilaterally       Labs:   Recent Labs     11/10/20  0455   WBC 8.1   HGB 13.2   HCT 39.5        Recent Labs     11/10/20  0455 11/11/20  0510 11/12/20  0450    138 137   K 4.4 4.2 4.3    106 104   CO2 25 24 25   BUN 23* 28* 25*   CREATININE <0.5* 0.5* <0.5*   CALCIUM 8.8 8.7 8.5     No results for input(s): AST, ALT, BILIDIR, BILITOT, ALKPHOS in the last 72 hours. No results for input(s): INR in the last 72 hours. No results for input(s): Javi Breeze in the last 72 hours. Urinalysis:      Lab Results   Component Value Date    NITRU Negative 11/11/2020    WBCUA 3-5 11/11/2020    BACTERIA 1+ 11/11/2020    RBCUA >100 11/11/2020    BLOODU LARGE 11/11/2020    SPECGRAV 1.025 11/11/2020    GLUCOSEU Negative 11/11/2020       Radiology:  XR CHEST PORTABLE   Final Result   Right upper extremity PICC line extends to expected location of cavoatrial   junction. No significant interval change in multifocal bilateral airspace disease as   compared to prior. XR CHEST PORTABLE   Final Result   Unchanged chest over the past 3 days with diffuse pulmonary disease, favored   to be related to pneumonia         XR CHEST PORTABLE   Final Result   Multifocal airspace opacities, slightly progressed on the right. XR CHEST PORTABLE   Final Result   No acute interval change. Bilateral airspace disease is similar to prior   exam.         XR CHEST PORTABLE   Final Result   Multifocal ground-glass opacification likely pneumonia including atypical   viral pneumonia. Pulmonary edema is a less likely etiology for the findings.                  Assessment/Plan:    Active Hospital Problems    Diagnosis    Acute respiratory failure due to COVID-19 ([facility]) [U07.1, J96.00]    Fever [R50.9]    Elevated d-dimer [R79.89]    Elevated C-reactive protein (CRP) [R79.82]    Essential hypertension [I10]    Ex-smoker [Z87.891]    Overweight [E66.3]    COVID-19

## 2020-11-12 NOTE — FLOWSHEET NOTE
11/11/20 2130   Provider Notification   Reason for Communication Evaluate   Provider Name C/Mandeep Moses   Provider Notification Physician   Method of Communication Secure Message   Response Waiting for response   Notification Time 2134     Pt has port on PICC that does not flush or have blood return. Would you like to order cathflo?

## 2020-11-12 NOTE — PROGRESS NOTES
Infectious Diseases   Progress Note      Admission Date: 10/31/2020  Hospital Day: Hospital Day: 13   Attending: Neris Sterling MD  Date of service: 11/12/2020     Chief complaint/ Reason for consult:     · Acute respiratory failure with hypoxia  · COVID-19 pneumonia  · Elevated D-dimer  · Elevated CRP in setting of COVID-19  · Leukopenia in setting of COVID-19    Microbiology:        I have reviewed allavailable micro lab data and cultures    Blood culture (2/2) - collected on 10/31/2020: Negative so far    Antibiotics and immunizations:       Current antibiotics: All antibiotics and their doses were reviewed by me    Recent Abx Admin      No antibiotic orders with administrations found. Immunization History: All immunization history was reviewed by me today. There is no immunization history on file for this patient. Known drug allergies: All allergies were reviewed and updated    No Known Allergies    Social history:     Social History:  All social andepidemiologic history was reviewed and updated by me today as needed. · Tobacco use:   reports that she has quit smoking. She has never used smokeless tobacco.  · Alcohol use:   reports current alcohol use. · Currently lives in[address] [place]  has no history on file for drug. Assessment:     The patient is a 67 y.o. old female who  has a past medical history of Back pain, Cancer ([address] Essential hypertension, Headache, Hypothyroidism, Migraines, Thyroid disease, and UTI (lower urinary tract infection). with following problems:    · Acute respiratory failure with hypoxia-now on just 4 L daily-nasal cannula COVID-19 pneumonia-v ongoing  · Elevated D-dimer-monitor trends  · Elevated CRP in setting of COVID-19-monitor lab trends  · Leukopenia in setting of COVID-19-resolved   · Essential hypertension-blood pressures controlled  · Hypothyroidism  · Ex-smoker  · Overweight* due to excess calorie intake :  Body mass index is 28.32 kg/m². Discussion:      The patient has completed a 10-day course of IV remdesivir already on 11/10/2020. She remains on Decadron 10 mg IV daily. Her oxygen requirement is slowly improving. He is on 4 L oxygen via nasal cannula. Serum creatinine 0.5. CRP is 31.4 today. This is an excellent improvement from yesterday    Plan:     Diagnostic Workup:    · Continue to monitor LDH, D-dimer, CRP, procalcitonin every other day for now. · Continue to follow  fever curve, WBC count and blood cultures. · Follow up on blood counts, liver and renal function. Antimicrobials:    · Will continue supportive care measures  · Continue Decadron daily at a dose of 10 mg IV daily. Can taper it down to 6 mg daily if O2 requirement improved to 2 L  · Encourage frequent change in postures and awake prone positioning as tolerated. · Cough and deep breathing exercises. · Anticoagulation per primary and pulmonary. · Continue to monitor vitals closely. · Continue supplemental oxygen/high flow/BiPAP as needed to oxygen saturation above 92%. · Maintain good glycemic control. · Continue to watch for new or worsening fever or productive cough or other signs of secondary bacterial infection. · Fall and aspiration precautions. · COVID-19 isolation precautions. · Continue close monitoring in the COVID-19 unit. Remains at high risk of complications. Drug Monitoring:    · Continue monitoring for antimicrobial agent toxicity as follows: CBC, CMP  · Continue to watch for following: new or worsening fever, new hypotension, hives, lip swelling and redness or purulence at vascular access sites. I/v access Management:    · Continue to monitor i.v access sites for erythema, induration, discharge or tenderness. · As always, continue efforts to minimize tubes/lines/drains as clinically appropriate to reduce chances of line associated infections.     TIME SPENT TODAY:     - Spent over  26  minutes on and basal cell carcinoma    Essential hypertension     Headache     Hypothyroidism     Migraines     Thyroid disease     UTI (lower urinary tract infection)     treated w/Bactrim per PT. Past Surgical History: All past surgical history was reviewed today. Past Surgical History:   Procedure Laterality Date    BLADDER SURGERY  08/2016    restructured bladder     MOHS SURGERY  01/10/2019    right jawline anterior inferior       Family History: All family history was reviewed today. Problem Relation Age of Onset    Heart Disease Daughter        Objective:       PHYSICAL EXAM:      Vitals:   Vitals:    11/12/20 0600 11/12/20 0716 11/12/20 0945 11/12/20 1245   BP:   (!) 92/55 (!) 96/57   Pulse: 62  66 72   Resp:  22 20 25   Temp:   96.8 °F (36 °C) 97 °F (36.1 °C)   TempSrc:   Temporal Temporal   SpO2:  95% 97% 97%   Weight:       Height:           Physical Exam       PHYSICAL EXAM:     In-person bedside physical examination deferred. Pursuant to the emergency declaration under the [address] [place] [address] and the [employer] Act, this clinical encounter was conducted to provide necessary medical care. (Also consistent with new provisions and guidance offered by [employer] on March 18, 2020 in setting of COVID 19 outbreak and in order to preserve personal protective equipment in accordance with the flexibilities announced by CMS on March 30, 2020)   References: [email or web address]://[email or web address]/Resources/COVID-19/3_18%20Telemed%20Guidance%20Updated%20March%2018. pdf?[email or web address]                      [email or web address]/[email or web address]/Resources/COVID-19/3_18%20Telemed%20Guidance%20Updated%20March%2018. pdf?[email or web address]                      [email or web address] pdf                            General: Awake and oriented to time place and person according to primary service, vitals reviewed  HEENT: Deferred  Cardiovascular: Telemetry data reviewed, rest deferred   Pulmonary: deferred  Abdomen/GI: deferred  Neuro: deferred  Skin: deferred  Musculoskeletal:  deferred  Genitourinary: Deferred  Psych: deferred  Lymphatic/Immunologic: deferred    Intake and output:    I/O last 3 completed shifts: In: 5 [P.O.:720]  Out: -     Lab Data:   All available labs and old records have been reviewed by me. CBC:  Recent Labs     11/10/20  0455   WBC 8.1   RBC 4.33   HGB 13.2   HCT 39.5      MCV 91.3   MCH 30.5   MCHC 33.4   RDW 13.3        BMP:  Recent Labs     11/10/20  0455 11/11/20  0510 11/12/20  0450    138 137   K 4.4 4.2 4.3    106 104   CO2 25 24 25   BUN 23* 28* 25*   CREATININE <0.5* 0.5* <0.5*   CALCIUM 8.8 8.7 8.5   GLUCOSE 136* 101* 89        Hepatic Function Panel:   Lab Results   Component Value Date    ALKPHOS 70 11/05/2020    ALT 35 11/05/2020    AST 19 11/05/2020    PROT 5.7 11/05/2020    BILITOT 0.5 11/05/2020    LABALBU 2.6 11/05/2020       CPK: No results found for: CKTOTAL  ESR: No results found for: SEDRATE  CRP:   Lab Results   Component Value Date    CRP 31.4 (H) 11/11/2020           Imaging: All pertinent images and reports for the current visit were reviewed by me during this visit. XR CHEST PORTABLE   Final Result   Right upper extremity PICC line extends to expected location of cavoatrial   junction. No significant interval change in multifocal bilateral airspace disease as   compared to prior. XR CHEST PORTABLE   Final Result   Unchanged chest over the past 3 days with diffuse pulmonary disease, favored   to be related to pneumonia         XR CHEST PORTABLE   Final Result   Multifocal airspace opacities, slightly progressed on the right. XR CHEST PORTABLE   Final Result   No acute interval change.   Bilateral airspace disease is similar to prior   exam.         XR CHEST PORTABLE   Final Result   Multifocal ground-glass opacification likely pneumonia including atypical   viral pneumonia. Pulmonary edema is a less likely etiology for the findings. Medications: All current and past medications were reviewed.  apixaban  5 mg Oral BID    dexamethasone  10 mg Intravenous Daily    lidocaine 1 % injection  5 mL Intradermal Once    sodium chloride flush  10 mL Intravenous 2 times per day    levothyroxine  112 mcg Oral Daily    venlafaxine  225 mg Oral Daily    aspirin  81 mg Oral Daily    calcium elemental  500 mg Oral Daily    sodium chloride flush  10 mL Intravenous 2 times per day    albuterol sulfate HFA  2 puff Inhalation 4x daily    And    ipratropium  2 puff Inhalation 4x daily           sodium chloride flush, calcium carbonate, lip balm petroleum free, sodium chloride, albuterol sulfate HFA, ALPRAZolam, sodium chloride flush, acetaminophen **OR** acetaminophen, magnesium hydroxide, promethazine **OR** ondansetron, sodium chloride, potassium chloride **OR** potassium alternative oral replacement **OR** potassium chloride, labetalol      Problem list:       Patient Active Problem List   Diagnosis Code    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 Although every effort was made to ensure the accuracy of this automated transcription, some errors in transcription may have occurred inadvertently. If you may need any clarification, please do not hesitate to contact me through [employer] or at the phone number provided below with my electronic signature.   Any pictures or media included in this note were obtained after taking informed verbal consent from the patient and with their approval to include those in the patient's medical record.     Hillary Ybarra MD, MPH  11/12/2020 , 4:20 PM   [facility]   [address] [place] [address]  Office: [phone]  Fax: [phone]  Clinic days:  Tuesday & Thursday

## 2020-11-12 NOTE — PROGRESS NOTES
Hospitalist Progress Note      PCP: Hortencia Barbosa MD    Date of Admission: 10/31/2020    Chief Complaint:   [facility] Course:   67 y. o. 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  Patient says she initially started getting sick on 10/11/2020 with having progressive shortness of air.  She was admitted to [facility] for 3 days was discharged out without any medications.  Patient says she has been attempting to manage her symptoms at home without any relief.  Said that she did have a fever of 101 this morning and has not taken any antipyretics 4 hours prior to arrival. BERNADETTE GUERRERO [facility] daughter did note that she sat up for approximately 45 minutes yesterday and became very fatigued and had to lay down.  She has had decreased activity due to her dyspnea on exertion.  Says she has had decreased p.o. intake doing to feeling very fatigued and short of air. denies having home O2.  She is currently on 4L 02 in ER here. Will need admission for acute resp fialure         Subjective:   Currently on 70% 50  L heated nasal cannula  She is SOB but tolerating this. She appears comfortable and is in no distress.   No acute event overnight    11/10  Alert oriented  On 55% 40 L heated nasal cannula  D-dimer decreased to 2034  On Decadron, had convalescent plasma, on remdesivir, Lovenox full dose  No acute event overnight    11/11  Requiring 45% 20 L of heated nasal cannula  No acute event overnight  On Decadron and Lovenox  Completed remdesivir      Medications:  Reviewed    Infusion Medications   Scheduled Medications    apixaban  5 mg Oral BID    dexamethasone  10 mg Intravenous Daily    lidocaine 1 % injection  5 mL Intradermal Once    sodium chloride flush  10 mL Intravenous 2 times per day    levothyroxine  112 mcg Oral Daily         Recent Labs     11/10/20  0455 11/11/20  0510 11/12/20  0450    138 137   K 4.4 4.2 4.3    106 104   CO2 25 24 25   BUN 23* 28* 25*   CREATININE <0.5* 0.5* <0.5*   CALCIUM 8.8 8.7 8.5     No results for input(s): AST, ALT, BILIDIR, BILITOT, ALKPHOS in the last 72 hours. No results for input(s): INR in the last 72 hours. No results for input(s): Ashish Blandon in the last 72 hours. Urinalysis:      Lab Results   Component Value Date    NITRU Negative 11/11/2020    WBCUA 3-5 11/11/2020    BACTERIA 1+ 11/11/2020    RBCUA >100 11/11/2020    BLOODU LARGE 11/11/2020    SPECGRAV 1.025 11/11/2020    GLUCOSEU Negative 11/11/2020       Radiology:  XR CHEST PORTABLE   Final Result   Right upper extremity PICC line extends to expected location of cavoatrial   junction. No significant interval change in multifocal bilateral airspace disease as   compared to prior. XR CHEST PORTABLE   Final Result   Unchanged chest over the past 3 days with diffuse pulmonary disease, favored   to be related to pneumonia         XR CHEST PORTABLE   Final Result   Multifocal airspace opacities, slightly progressed on the right. XR CHEST PORTABLE   Final Result   No acute interval change. Bilateral airspace disease is similar to prior   exam.         XR CHEST PORTABLE   Final Result   Multifocal ground-glass opacification likely pneumonia including atypical   viral pneumonia. Pulmonary edema is a less likely etiology for the findings.                  Assessment/Plan:    Active Hospital Problems    Diagnosis    Acute respiratory failure due to COVID-19 (HCC) [U07.1, J96.00]    Fever [R50.9]    Elevated d-dimer [R79.89]    Elevated C-reactive protein (CRP) [R79.82]    Essential hypertension [I10]    Ex-smoker [Z87.891]    Overweight [E66.3]    COVID-19 [U07.1]    COVID-19 virus infection [U07.1]    Depression [F32.9]    Hypothyroid [E03.9]       Acute respiratory failure with hypoxia  Secondary to COVID-19 pneumonia  Continues to require heated high flow FiO2 70% and 50 L/min flow. Heated high flow at 55%, 40 L  she continues on Remdesivir ( DAY 10) She is in Decadron  daily  she has completed convalescent plasma. elevated CRP  D-dimer 2034  she is in full dose anticoagulation.     Hypothyroid  resume home meds    HTN  home meds    DVT Prophylaxis: lovenox  Diet: DIET GENERAL;  Code Status: Full Code    PT/OT Eval Status: when appropriate    Dispo - Continue ICU level of care , LTAC is following, family wants the patient to be discharged home    30 min critical care time spent     Rachell Calderon MD

## 2020-11-12 NOTE — PLAN OF CARE
Problem: Falls - Risk of:  Goal: Will remain free from falls  Description: Will remain free from falls  11/12/2020 0020 by Elver Raymundo RN  Outcome: Ongoing  11/11/2020 1101 by Lopez Carbajal RN  Outcome: Ongoing  Goal: Absence of physical injury  Description: Absence of physical injury  11/12/2020 0020 by Elver Raymundo RN  Outcome: Ongoing  11/11/2020 1101 by Lopez Carbajal RN  Outcome: Ongoing     Problem: Airway Clearance - Ineffective  Goal: Achieve or maintain patent airway  11/12/2020 0020 by Elver Raymundo RN  Outcome: Ongoing  11/11/2020 1101 by Lopez Carbajal RN  Outcome: Ongoing     Problem: Gas Exchange - Impaired  Goal: Absence of hypoxia  11/12/2020 0020 by Elver Raymundo RN  Outcome: Ongoing  11/11/2020 1101 by Lopez Carbajal RN  Outcome: Ongoing  Goal: Promote optimal lung function  11/12/2020 0020 by Elver Raymundo RN  Outcome: Ongoing  11/11/2020 1101 by Lopez Carbajal RN  Outcome: Ongoing     Problem: Breathing Pattern - Ineffective  Goal: Ability to achieve and maintain a regular respiratory rate  11/12/2020 0020 by Elver Raymundo RN  Outcome: Ongoing  11/11/2020 1101 by Lopez Carbajal RN  Outcome: Ongoing     Problem:  Body Temperature -  Risk of, Imbalanced  Goal: Ability to maintain a body temperature within defined limits  11/12/2020 0020 by Elver Raymundo RN  Outcome: Ongoing  11/11/2020 1101 by Lopez Carbajal RN  Outcome: Ongoing  Goal: Will regain or maintain usual level of consciousness  11/12/2020 0020 by Elver Raymundo RN  Outcome: Ongoing  11/11/2020 1101 by Lopez Carbajal RN  Outcome: Ongoing  Goal: Complications related to the disease process, condition or treatment will be avoided or minimized  11/12/2020 0020 by Elver Raymundo RN  Outcome: Ongoing  11/11/2020 1101 by Lopez Carbajal RN  Outcome: Ongoing     Problem: Isolation Precautions - Risk of Spread of Infection  Goal: Prevent transmission of infection  11/12/2020 0020 by Kinsey Soto RN  Outcome: Ongoing  11/11/2020 1101 by Payal Morgan RN  Outcome: Ongoing     Problem: Nutrition Deficits  Goal: Optimize nutrtional status  11/12/2020 0020 by Kinsey Soto RN  Outcome: Ongoing  11/11/2020 1101 by Payal Morgan RN  Outcome: Ongoing     Problem: Risk for Fluid Volume Deficit  Goal: Maintain normal heart rhythm  11/12/2020 0020 by Kinsey Soto RN  Outcome: Ongoing  11/11/2020 1101 by Payal Morgan RN  Outcome: Ongoing  Goal: Maintain absence of muscle cramping  11/12/2020 0020 by Kinsey Soto RN  Outcome: Ongoing  11/11/2020 1101 by Payal Morgan RN  Outcome: Ongoing  Goal: Maintain normal serum potassium, sodium, calcium, phosphorus, and pH  11/12/2020 0020 by Kinsey Soto RN  Outcome: Ongoing  11/11/2020 1101 by Payal Morgan RN  Outcome: Ongoing     Problem: Loneliness or Risk for Loneliness  Goal: Demonstrate positive use of time alone when socialization is not possible  11/12/2020 0020 by Kinsey Soot RN  Outcome: Ongoing  11/11/2020 1101 by Payal Morgan RN  Outcome: Ongoing     Problem: Fatigue  Goal: Verbalize increase energy and improved vitality  11/12/2020 0020 by Kinsey Soto RN  Outcome: Ongoing  11/11/2020 1101 by Payal Morgan RN  Outcome: Ongoing     Problem: Patient Education: Go to Patient Education Activity  Goal: Patient/Family Education  11/12/2020 0020 by Kinsey Soto RN  Outcome: Ongoing  11/11/2020 1101 by Payal Morgan RN  Outcome: Ongoing     Problem: Pain:  Goal: Pain level will decrease  Description: Pain level will decrease  11/12/2020 0020 by Kinsey Soto RN  Outcome: Ongoing  11/11/2020 1101 by Payal Morgan RN  Outcome: Ongoing  Goal: Control of acute pain  Description: Control of acute pain  11/12/2020 0020 by Kinsey Soto RN  Outcome: Ongoing  11/11/2020 1101 by Payal Morgan RN  Outcome: Ongoing  Goal: Control of chronic pain  Description: Control of chronic pain  11/12/2020 0020 by Jaime Major RN  Outcome: Ongoing  11/11/2020 1101 by Peiro Gant RN  Outcome: Ongoing     Problem: Skin Integrity:  Goal: Will show no infection signs and symptoms  Description: Will show no infection signs and symptoms  11/12/2020 0020 by Jaime aMjor RN  Outcome: Ongoing  11/11/2020 1101 by Piero Gant RN  Outcome: Ongoing  Goal: Absence of new skin breakdown  Description: Absence of new skin breakdown  11/12/2020 0020 by Jaime Major RN  Outcome: Ongoing  11/11/2020 1101 by Piero Gant RN  Outcome: Ongoing

## 2020-11-13 VITALS
TEMPERATURE: 97.5 F | SYSTOLIC BLOOD PRESSURE: 98 MMHG | DIASTOLIC BLOOD PRESSURE: 58 MMHG | RESPIRATION RATE: 24 BRPM | HEART RATE: 69 BPM | HEIGHT: 64 IN | OXYGEN SATURATION: 95 % | BODY MASS INDEX: 29.02 KG/M2 | WEIGHT: 170 LBS

## 2020-11-13 LAB
ANION GAP SERPL CALCULATED.3IONS-SCNC: 6 MMOL/L (ref 3–16)
ANISOCYTOSIS: ABNORMAL
ATYPICAL LYMPHOCYTE RELATIVE PERCENT: 1 % (ref 0–6)
BANDED NEUTROPHILS RELATIVE PERCENT: 1 % (ref 0–7)
BASOPHILS ABSOLUTE: 0 K/UL (ref 0–0.2)
BASOPHILS RELATIVE PERCENT: 0 %
BUN BLDV-MCNC: 25 MG/DL (ref 7–20)
CALCIUM SERPL-MCNC: 8.4 MG/DL (ref 8.3–10.6)
CHLORIDE BLD-SCNC: 105 MMOL/L (ref 99–110)
CO2: 27 MMOL/L (ref 21–32)
CREAT SERPL-MCNC: <0.5 MG/DL (ref 0.6–1.2)
EOSINOPHILS ABSOLUTE: 0 K/UL (ref 0–0.6)
EOSINOPHILS RELATIVE PERCENT: 0 %
GFR AFRICAN AMERICAN: >60
GFR NON-AFRICAN AMERICAN: >60
GLUCOSE BLD-MCNC: 104 MG/DL (ref 70–99)
HCT VFR BLD CALC: 38.9 % (ref 36–48)
HEMOGLOBIN: 13.2 G/DL (ref 12–16)
LYMPHOCYTES ABSOLUTE: 1.1 K/UL (ref 1–5.1)
LYMPHOCYTES RELATIVE PERCENT: 9 %
MCH RBC QN AUTO: 30.7 PG (ref 26–34)
MCHC RBC AUTO-ENTMCNC: 33.9 G/DL (ref 31–36)
MCV RBC AUTO: 90.5 FL (ref 80–100)
METAMYELOCYTES RELATIVE PERCENT: 2 %
MONOCYTES ABSOLUTE: 0.6 K/UL (ref 0–1.3)
MONOCYTES RELATIVE PERCENT: 6 %
NEUTROPHILS ABSOLUTE: 9 K/UL (ref 1.7–7.7)
NEUTROPHILS RELATIVE PERCENT: 81 %
OVALOCYTES: ABNORMAL
PDW BLD-RTO: 13.6 % (ref 12.4–15.4)
PLATELET # BLD: 239 K/UL (ref 135–450)
PLATELET SLIDE REVIEW: ADEQUATE
PMV BLD AUTO: 8.2 FL (ref 5–10.5)
POLYCHROMASIA: ABNORMAL
POTASSIUM SERPL-SCNC: 4.4 MMOL/L (ref 3.5–5.1)
RBC # BLD: 4.3 M/UL (ref 4–5.2)
SLIDE REVIEW: ABNORMAL
SODIUM BLD-SCNC: 138 MMOL/L (ref 136–145)
WBC # BLD: 10.7 K/UL (ref 4–11)

## 2020-11-13 PROCEDURE — 94640 AIRWAY INHALATION TREATMENT: CPT

## 2020-11-13 PROCEDURE — 2700000000 HC OXYGEN THERAPY PER DAY

## 2020-11-13 PROCEDURE — 6360000002 HC RX W HCPCS: Performed by: INTERNAL MEDICINE

## 2020-11-13 PROCEDURE — 99232 SBSQ HOSP IP/OBS MODERATE 35: CPT | Performed by: INTERNAL MEDICINE

## 2020-11-13 PROCEDURE — 80048 BASIC METABOLIC PNL TOTAL CA: CPT

## 2020-11-13 PROCEDURE — 99233 SBSQ HOSP IP/OBS HIGH 50: CPT | Performed by: INTERNAL MEDICINE

## 2020-11-13 PROCEDURE — 2580000003 HC RX 258: Performed by: INTERNAL MEDICINE

## 2020-11-13 PROCEDURE — 6370000000 HC RX 637 (ALT 250 FOR IP): Performed by: INTERNAL MEDICINE

## 2020-11-13 PROCEDURE — 2580000003 HC RX 258: Performed by: STUDENT IN AN ORGANIZED HEALTH CARE EDUCATION/TRAINING PROGRAM

## 2020-11-13 PROCEDURE — 94680 O2 UPTK RST&XERS DIR SIMPLE: CPT

## 2020-11-13 PROCEDURE — 94761 N-INVAS EAR/PLS OXIMETRY MLT: CPT

## 2020-11-13 PROCEDURE — 97116 GAIT TRAINING THERAPY: CPT

## 2020-11-13 PROCEDURE — 97530 THERAPEUTIC ACTIVITIES: CPT

## 2020-11-13 PROCEDURE — 85025 COMPLETE CBC W/AUTO DIFF WBC: CPT

## 2020-11-13 RX ORDER — PREDNISONE 20 MG/1
TABLET ORAL
Qty: 20 TABLET | Refills: 0 | Status: SHIPPED | OUTPATIENT
Start: 2020-11-13 | End: 2020-11-21

## 2020-11-13 RX ORDER — DEXAMETHASONE SODIUM PHOSPHATE 10 MG/ML
6 INJECTION, SOLUTION INTRAMUSCULAR; INTRAVENOUS DAILY
Status: DISCONTINUED | OUTPATIENT
Start: 2020-11-14 | End: 2020-11-13 | Stop reason: HOSPADM

## 2020-11-13 RX ORDER — 0.9 % SODIUM CHLORIDE 0.9 %
500 INTRAVENOUS SOLUTION INTRAVENOUS ONCE
Status: COMPLETED | OUTPATIENT
Start: 2020-11-13 | End: 2020-11-13

## 2020-11-13 RX ADMIN — ALBUTEROL SULFATE 2 PUFF: 90 AEROSOL, METERED RESPIRATORY (INHALATION) at 16:42

## 2020-11-13 RX ADMIN — IPRATROPIUM BROMIDE 2 PUFF: 17 AEROSOL, METERED RESPIRATORY (INHALATION) at 08:33

## 2020-11-13 RX ADMIN — Medication 500 MG: at 08:52

## 2020-11-13 RX ADMIN — Medication 10 ML: at 08:52

## 2020-11-13 RX ADMIN — DEXAMETHASONE SODIUM PHOSPHATE 10 MG: 10 INJECTION, SOLUTION INTRAMUSCULAR; INTRAVENOUS at 08:52

## 2020-11-13 RX ADMIN — ASPIRIN 81 MG: 81 TABLET, COATED ORAL at 08:52

## 2020-11-13 RX ADMIN — IPRATROPIUM BROMIDE 2 PUFF: 17 AEROSOL, METERED RESPIRATORY (INHALATION) at 16:42

## 2020-11-13 RX ADMIN — IPRATROPIUM BROMIDE 2 PUFF: 17 AEROSOL, METERED RESPIRATORY (INHALATION) at 13:05

## 2020-11-13 RX ADMIN — VENLAFAXINE HYDROCHLORIDE 225 MG: 150 CAPSULE, EXTENDED RELEASE ORAL at 08:52

## 2020-11-13 RX ADMIN — SODIUM CHLORIDE 500 ML: 9 INJECTION, SOLUTION INTRAVENOUS at 12:19

## 2020-11-13 RX ADMIN — LEVOTHYROXINE SODIUM 112 MCG: 0.11 TABLET ORAL at 06:01

## 2020-11-13 RX ADMIN — Medication 10 ML: at 08:53

## 2020-11-13 RX ADMIN — APIXABAN 5 MG: 5 TABLET, FILM COATED ORAL at 08:52

## 2020-11-13 RX ADMIN — ALBUTEROL SULFATE 2 PUFF: 90 AEROSOL, METERED RESPIRATORY (INHALATION) at 13:06

## 2020-11-13 RX ADMIN — ALBUTEROL SULFATE 2 PUFF: 90 AEROSOL, METERED RESPIRATORY (INHALATION) at 08:32

## 2020-11-13 ASSESSMENT — PAIN SCALES - GENERAL
PAINLEVEL_OUTOF10: 0

## 2020-11-13 ASSESSMENT — ENCOUNTER SYMPTOMS
SHORTNESS OF BREATH: 0
STRIDOR: 0
TROUBLE SWALLOWING: 0
CHEST TIGHTNESS: 0
CHOKING: 0
NAUSEA: 0
RHINORRHEA: 0
EYE DISCHARGE: 0
FACIAL SWELLING: 0
ABDOMINAL PAIN: 0
APNEA: 0
DIARRHEA: 0
COLOR CHANGE: 0
PHOTOPHOBIA: 0
EYE REDNESS: 0
BLOOD IN STOOL: 0
COUGH: 1

## 2020-11-13 NOTE — FLOWSHEET NOTE
11/13/20 4036   Provider Notification   Reason for Communication Evaluate   Provider Name [facility]   Provider Notification Physician   Method of Communication Secure Message   Response Waiting for response   Notification Time [id number]     \"BP was 81/57 map 65 Rechecked 77/44 map 54 Patient states she feels fine just tired\"    [phone] orders for iv fluid bolus

## 2020-11-13 NOTE — DISCHARGE INSTR - COC
Continuity of Care Form    Patient Name: Elke Martínez   :  1940  MRN:  [id number]    Admit date:  10/31/2020  Discharge date:  20    Code Status Order: Full Code   Advance Directives:   [facility] Documentation       Date/Time Healthcare Directive Type of Healthcare Directive Copy in [address] Agent's Name Healthcare Agent's Phone Number    20 0000  Yes, patient has an advance directive for healthcare treatment  Living will;Durable power of  for health care  No, copy requested from family  Adult [place] [address]  --            Admitting Physician:  Ema Ang MD  PCP: Anais Sanchez MD    Discharging Nurse: [address] [place] [address] Unit/Room#: I4G-2609/8239-61  Discharging Unit Phone Number: [phone]    Emergency Contact:   Extended Emergency Contact Information  Primary Emergency Contact: [address] Phone: [phone]  Mobile Phone: [phone]  Relation: Child  Secondary Emergency Contact: Huyen Barrios  Mobile Phone: [phone]  Relation: Child    Past Surgical History:  Past Surgical History:   Procedure Laterality Date    BLADDER SURGERY  2016    restructured bladder     MOHS SURGERY  01/10/2019    right jawline anterior inferior       Immunization History: There is no immunization history on file for this patient.     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    COVID-19 U07.1    COVID-19 virus infection U07.1    Depression F32.9    Hypothyroid E03.9    Acute respiratory failure due to COVID-19 (HCC) U07.1, J96.00    Fever R50.9    Elevated d-dimer R79.89    Elevated C-reactive protein (CRP) R79.82    Essential hypertension I10    Ex-smoker Z87.891    Overweight E66.3       Isolation/Infection:   Isolation            Droplet Plus          Patient Infection Status Infection Onset Added Last Indicated Last Indicated By Review Planned Expiration Resolved Resolved By    COVID-19  11/03/20 11/03/20 Manjit Buck RN 11/10/20 11/17/20      1027 + COVID [facility] Елена    Resolved    C-diff Rule Out 11/01/20 11/01/20 11/01/20 Clostridium difficile toxin/antigen (Ordered)   11/06/20 Manjit Buck RN    COVID-19 Rule Out 10/31/20 10/31/20 10/31/20 COVID-19 (Ordered)   11/03/20 Manjit Buck RN            Nurse Assessment:  Last Vital Signs: /61   Pulse 59   Temp 96.1 °F (35.6 °C) (Temporal)   Resp 25   Ht 5' 4\" (1.626 m)   Wt 170 lb (77.1 kg)   SpO2 96%   BMI 29.18 kg/m²     Last documented pain score (0-10 scale): Pain Level: 0  Last Weight:   Wt Readings from Last 1 Encounters:   11/13/20 170 lb (77.1 kg)     Mental Status:  oriented and alert    IV Access:  - None    Nursing Mobility/ADLs:  Walking   Assisted  Transfer  Assisted  Bathing  Assisted  Dressing  Assisted  Toileting  Assisted  Feeding  Independent  Med Admin  Independent  Med Delivery   whole    Wound Care Documentation and Therapy:        Elimination:  Continence: Bowel: Yes  Bladder: Yes  Urinary Catheter: None   Colostomy/Ileostomy/Ileal Conduit: No       Date of Last BM: 11/13    Intake/Output Summary (Last 24 hours) at 11/13/2020 0747  Last data filed at 11/13/2020 0419  Gross per 24 hour   Intake 600 ml   Output --   Net 600 ml     I/O last 3 completed shifts: In: 600 [P.O.:600]  Out: -     Safety Concerns: At Risk for Falls    Impairments/Disabilities:      None    Nutrition Therapy:  Current Nutrition Therapy:   - Oral Diet:  General    Routes of Feeding: Oral  Liquids: Thin Liquids  Daily Fluid Restriction: no  Last Modified Barium Swallow with Video (Video Swallowing Test): not done    Treatments at the Time of Hospital Discharge:   Respiratory Treatments: albuterol  Oxygen Therapy:  is on oxygen at 2 L/min per nasal cannula.   Ventilator:    - No ventilator support    Rehab Therapies: Physical Therapy, Occupational Therapy and Nursing  Weight Bearing Status/Restrictions: No weight bearing restirctions  Other Medical Equipment (for information only, NOT a DME order):  wheelchair and walker  Other Treatments:   [facility]: [address]  to establish plan of care for patient over 60 day period   Nursing  Initial home SN evaluation visit to occur within 24-48 hours for:  medication management  VS and clinical assessment  S&S chronic disease exacerbation education + when to contact MD / NP  care coordination  Medication Reconciliation during 1st SN visit    PT/OT/Speech   Evaluations in home within 24-48 hours of discharge to include DME and home safety   Frontload therapy 5 days, then 3x a week   OT to evaluate if patient has [address] needs for personal care      evaluation within 24-48 hours to evaluate resources & insurance for potential AL, [place], LTC, and Medicaid options      Palliative Care referral within 5 days of hospital discharge  PCP Visit scheduled within 3 - 7 days of hospital discharge      Telehealth-Homecare Vitals(If patient is agreeable and meets guidelines)      Patient's personal belongings (please select all that are sent with patient):  None    RN SIGNATURE:  Electronically signed by Soni Fleming RN on 20 at [facility] PM EST    CASE MANAGEMENT/SOCIAL WORK SECTION    Inpatient Status Date: 10/31/2020    Readmission Risk Assessment Score:  Readmission Risk              Risk of Unplanned Readmission:        11           Discharging to Facility/ Agency   [facility]: Zain Cagle:     [address] [facility]   [facility] [address] [place] [address]   PHONE:        [phone]:              [phone]  Phone:  Fax:    Name:  Rosina  Phone:  [phone]  Fax:      [phone]  Dialysis Facility (if applicable)   Name:  Address:  Dialysis Schedule:  Phone:  Fax:    /Social

## 2020-11-13 NOTE — PROGRESS NOTES
Reassessment complete and documented. Patient is up in chair, currently on 1L and stable. Call light in reach, chair alarm on.

## 2020-11-13 NOTE — PROGRESS NOTES
Home Oxygen Evaluation     Patients room air at rest saturation SpO2 91%       Patients room air saturation SpO2 86% with exertion      Patients SpO2 on exertion with oxygen   1 lpm = SpO2  87%    2 lpm = SpO2  89%    3 lpm = SpO2  93%

## 2020-11-13 NOTE — PROGRESS NOTES
[facility] home care referral. Bess Montoya has approved servicing this pt for home care.   Thanks

## 2020-11-13 NOTE — PROGRESS NOTES
Updated patient's daughter on discharge plan. Home care orders placed. [facility] following. Home O2 eval done per RT. Results in chart. Notified MD for order. DME order placed. Call placed to [facility] at this time. DME order also placed for wheelchair. Wheelchair will be delivered to patient's house. OP pharmacy delivered eliquis prescription. DENNIS + AVS completed.

## 2020-11-13 NOTE — PROGRESS NOTES
Physical Therapy  Facility/Department: [address]  Daily Treatment Note  NAME: Mayela Taylor  : 1940  MRN: [id number]    Date of Service: 2020    Discharge Recommendations: Mayela Taylor scored a 17/24 on the AM-PAC short mobility form. Current research shows that an AM-PAC score of 17 or less is typically not associated with a discharge to the patient's home setting. Based on the patient's AM-PAC score and their current functional mobility deficits, it is recommended that the patient have 3-5 sessions per week of Physical Therapy at d/c to increase the patient's independence. Please see assessment section for further patient specific details. If patient discharges prior to next session this note will serve as a discharge summary. Please see below for the latest assessment towards goals. Pt plans to return home with family's assistance . PT Equipment Recommendations  Equipment Needed: Yes  Mobility Devices: Wheelchair  Wheelchair: Light Weight  Other: The pt will need a w/c if she discharges home with family. She is unable to ambulate a household distance due to SOB and hypoxia. Assessment   Body structures, Functions, Activity limitations: Decreased balance;Decreased endurance  Assessment: The pt has improved since yesterday with her O2 requirement and ability to stand/ambulate. She continues to have SOB with 30' of ambulation and hypoxia on 1L/min of O2. Pt plans to return home with 24 hour assistance.   Prognosis: Good  PT Education: Goals;PT Role;Plan of Care;General Safety;Transfer Training;Gait Training  Patient Education: DC recommendations, pt verbalized understanding  Barriers to Learning: none  REQUIRES PT FOLLOW UP: Yes  Activity Tolerance  Activity Tolerance: Patient Tolerated treatment well;Patient limited by endurance(limited by SOB)  Activity Tolerance: 95% O2 sat at rest on 1L/min of O2, 86-91% after ambulation     Patient Diagnosis(es): The primary encounter diagnosis was COVID-19 virus infection. Diagnoses of Hypoxia, Fever in other diseases, and Pneumonia due to organism were also pertinent to this visit. has a past medical history of Back pain, Cancer ([address] Essential hypertension, Headache, Hypothyroidism, Migraines, Thyroid disease, and UTI (lower urinary tract infection). has a past surgical history that includes Bladder surgery (08/2016) and Mohs surgery (01/10/2019). Restrictions  Restrictions/Precautions  Restrictions/Precautions: Fall Risk  Position Activity Restriction  Other position/activity restrictions: 67 y.o. 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 Patient says she initially started getting sick on 10/11/2020 with having progressive shortness of air. She was admitted to [facility] for 3 days was discharged out without any medications. Patient says she has been attempting to manage her symptoms at home without any relief. Said that she did have a fever of 101 this morning and has not taken any antipyretics 4 hours prior to arrival.  Her daughter did note that she sat up for approximately 45 minutes yesterday and became very fatigued and had to lay down. She has had decreased activity due to her dyspnea on exertion. Says she has had decreased p.o. intake doing to feeling very fatigued and short of air. denies having home O2. She is currently on 4L 02 in ER here. Will need admission for acute resp failure  Subjective   General  Chart Reviewed: Yes  Response To Previous Treatment: Patient with no complaints from previous session.   Family / Caregiver Present: No  Subjective  Subjective: pt denied, pt is eager to DC home with family's assistance  General Comment  Comments: pt was sitting in a chair upon PT arrival, agreeable to PT  Pain Screening  Patient Currently in Pain: Denies  Vital Signs  Patient Currently in Pain: Denies       Orientation  Orientation  Overall Orientation Status: Within Functional Limits       Objective      Transfers  Sit to Stand: Stand by assistance  Stand to sit: Contact guard assistance  Ambulation  Ambulation?: Yes  Ambulation 1  Surface: level tile  Device: Rolling Walker  Other Apparatus: O2  Assistance: Contact guard assistance  Quality of Gait: pt has frequent start/stops during ambulation  Gait Deviations: Slow Josiah;Decreased step length  Distance: 28' x 2  Comments: VC to increase josiah and decrease respiratory rate, pt was SOB with each ambulation     Balance  Posture: Good  Sitting - Static: Good  Sitting - Dynamic: Good  Standing - Static: Fair  Standing - Dynamic: Fair;-(CGA with RW)           AM-PAC Score  AM-PAC Inpatient Mobility Raw Score : 17 (11/13/20 1537)  AM-PAC Inpatient T-Scale Score : 42.13 (11/13/20 1537)  Mobility Inpatient CMS 0-100% Score: 50.57 (11/13/20 1537)  Mobility Inpatient CMS G-Code Modifier : CK (11/13/20 1537)          Goals  Short term goals  Time Frame for Short term goals: To be met prior to DC  Short term goal 1: Pt will perform bed mobility with mod I  Short term goal 2: Pt will perform sit to/from stand transfers with SBA  Short term goal 3: Pt will ambulate 36' with RW and CGA  Patient Goals   Patient goals : return home with family's assistance  *no goals met    Plan    Plan  Times per week: 3-5  Times per day: Daily  Current Treatment Recommendations: Strengthening, ROM, Balance Training, Functional Mobility Training, Transfer Training, Endurance Training, Gait Training, Neuromuscular Re-education, Safety Education & Training  Safety Devices  Type of devices:  All fall risk precautions in place, Call light within reach, Left in chair, Nurse notified, Chair alarm in place     Therapy Time   Individual Concurrent Group Co-treatment   Time In 1511         Time Out 1537

## 2020-11-13 NOTE — PROGRESS NOTES
CLINICAL PHARMACY NOTE: MEDS TO [address] Select Patient?: No  Total # of Prescriptions Filled: 1   The following medications were delivered to the patient:  · eliquis 2.5mg  Total # of Interventions Completed: 1  Time Spent (min): 30    Additional Documentation:  Medication delivered- NESTOR Manzo signed  Julio mcqueen with Tawana Lee

## 2020-11-13 NOTE — DISCHARGE SUMMARY
Hospital Medicine Discharge Summary    Patient ID: Eduardo Sr      Patient's PCP: Young Shields MD    Admit Date: 10/31/2020     Discharge Date:  11/13/2020    Admitting Physician: Sangita Morales MD     Discharge Physician: Josh Gale MD     Discharge Diagnoses: Active Hospital Problems    Diagnosis    Acute respiratory failure due to COVID-19 (HCC) [U07.1, J96.00]    Fever [R50.9]    Elevated d-dimer [R79.89]    Elevated C-reactive protein (CRP) [R79.82]    Essential hypertension [I10]    Ex-smoker [Z87.891]    Overweight [E66.3]    COVID-19 [U07.1]    COVID-19 virus infection [U07.1]    Depression [F32.9]    Hypothyroid [E03.9]       The patient was seen and examined on day of discharge and this discharge summary is in conjunction with any daily progress note from day of discharge. Hospital Course:     67 y. o. 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  Patient says she initially started getting sick on 10/11/2020 with having progressive shortness of air.  She was admitted to [facility] for 3 days was discharged out without any medications.  Patient says she has been attempting to manage her symptoms at home without any relief.  Said that she did have a fever of 101 this morning and has not taken any antipyretics 4 hours prior to arrival. BERNADETTE GUERRERO [facility] daughter did note that she sat up for approximately 45 minutes yesterday and became very fatigued and had to lay down.  She has had decreased activity due to her dyspnea on exertion.  Says she has had decreased p.o. intake doing to feeling very fatigued and short of air. denies having home O2.  She is currently on 4L 02 in ER here.  Will need admission for acute resp fialure    In the hospital she been treated for the flu medical conditions:    Acute respiratory failure with hypoxia  Secondary to COVID-19 pneumonia  Continues to require heated high flow FiO2 70% and 50 L/min flow. Heated high flow at 55%, 40 L  she continues on Remdesivir ( DAY 10) She is in Decadron  daily  she has completed convalescent plasma. elevated CRP  D-dimer 2034  she is in full dose anticoagulation. This morning on 6 to 8 L nasal cannula  We will titrate down oxygen  Family wants to take her home upon discharge  Prior to discharge, oxygen requirement was 2 L nasal cannula,  Patient was discharged home on oxygen and to follow-up with PT OT.     Hypothyroid  resume home meds     HTN  home meds      Physical Exam Performed:     /69   Pulse 61   Temp 97.3 °F (36.3 °C) (Temporal)   Resp 28   Ht 5' 4\" (1.626 m)   Wt 170 lb (77.1 kg)   SpO2 98%   BMI 29.18 kg/m²          In-person bedside physical examination deferred. Pursuant to the emergency declaration under the [address] [place] [address], [address] [place] [address] and the [employer] Act, this clinical encounter was conducted to provide necessary medical care. (Also consistent with new provisions and guidance offered by [facility] on March 18, 2020 in setting of COVID 19 outbreak and in order to preserve personal protective equipment in accordance with the flexibilities announced by CMS on March 30, 2020)   References: https://[facility]. [place].[facility]/Portals/0/Resources/COVID-19/3_18%20Telemed%20Guidance%20Updated%20March%2018. pdf?[email or web address]                      https://[facility]. [facility]/Portals/0/Resources/COVID-19/3_18%20Telemed%20Guidance%20Updated%20March%2018. pdf?[email or web address]                      [email or web address] pdf                             General: Alert oriented, in no acute distress, currently on 2 L nasal cannula  HEENT: Deferred  Cardiovascular: Telemetry data reviewed, rest deferred   Pulmonary: deferred  Abdomen/GI: deferred  Neuro: deferred  Skin: deferred  Musculoskeletal:  deferred  Genitourinary: Deferred  Psych: deferred  Lymphatic/Immunologic: deferred      Labs: For convenience and continuity at follow-up the following most recent labs are provided:      CBC:    Lab Results   Component Value Date    WBC 10.7 11/13/2020    HGB 13.2 11/13/2020    HCT 38.9 11/13/2020     11/13/2020       Renal:    Lab Results   Component Value Date     11/13/2020    K 4.4 11/13/2020    K 3.9 11/08/2020     11/13/2020    CO2 27 11/13/2020    BUN 25 11/13/2020    CREATININE <0.5 11/13/2020    CALCIUM 8.4 11/13/2020    PHOS 3.6 11/08/2020         Significant Diagnostic Studies    Radiology:   XR CHEST PORTABLE   Final Result   Right upper extremity PICC line extends to expected location of cavoatrial   junction. No significant interval change in multifocal bilateral airspace disease as   compared to prior. XR CHEST PORTABLE   Final Result   Unchanged chest over the past 3 days with diffuse pulmonary disease, favored   to be related to pneumonia         XR CHEST PORTABLE   Final Result   Multifocal airspace opacities, slightly progressed on the right. XR CHEST PORTABLE   Final Result   No acute interval change. Bilateral airspace disease is similar to prior   exam.         XR CHEST PORTABLE   Final Result   Multifocal ground-glass opacification likely pneumonia including atypical   viral pneumonia. Pulmonary edema is a less likely etiology for the findings.                 Consults:     IP CONSULT TO INTERNAL MEDICINE  IP CONSULT TO INFECTIOUS DISEASES  IP CONSULT TO PULMONOLOGY  IP CONSULT TO HOME CARE NEEDS    Disposition: Home    Condition at Discharge: Stable    Discharge Instructions/Follow-up: Continue taking Eliquis 2.5 mg 2 times daily for 2 weeks, follow-up with home care, PT, OT, RN, continue taking prednisone with taper dose, continue self quarantine, follow-up with PCP in 1 week. Code Status:  Full Code     Activity: activity as tolerated    Diet: regular diet      Discharge Medications:     Current Discharge Medication List           Details   apixaban (ELIQUIS) 2.5 MG TABS tablet Take 1 tablet by mouth 2 times daily for 14 days  Qty: 60 tablet, Refills: 0      predniSONE (DELTASONE) 20 MG tablet Take 2 tablets by mouth daily for 2 days, THEN 1.5 tablets daily for 2 days, THEN 1 tablet daily for 2 days, THEN 0.5 tablets daily for 2 days. Qty: 20 tablet, Refills: 0              Details   aspirin 81 MG tablet Take 81 mg by mouth daily      venlafaxine (EFFEXOR-XR) 150 MG XR capsule Take 225 mg by mouth daily Indications: Per PT 3/14/2016      SYNTHROID 112 MCG tablet       Calcium Carbonate-Vit D-Min 600-200 MG-UNIT TABS Take 2,000 Units by mouth.      calcium carbonate (OSCAL) 500 MG TABS tablet Take 500 mg by mouth daily      Omega-3 Fatty Acids (FISH OIL) 1000 MG CAPS Take 3,000 mg by mouth 3 times daily      Diclofenac Potassium (CAMBIA PO) Take by mouth      trimethoprim (TRIMPEX) 100 MG tablet       ALPRAZolam (XANAX) 0.5 MG tablet Take 0.5 mg by mouth nightly as needed. Associated Diagnoses: Medication monitoring encounter; Onychomycosis             Time Spent on discharge is more than 30 minutes in the examination, evaluation, counseling and review of medications and discharge plan. Signed:    Rachell Calderon MD   11/13/2020      Thank you Brandon Neff MD for the opportunity to be involved in this patient's care. If you have any questions or concerns please feel free to contact me at [phone].

## 2020-11-13 NOTE — PROGRESS NOTES
Infectious Diseases   Progress Note      Admission Date: 10/31/2020  Hospital Day: Hospital Day: 14   Attending: Reyna Barrett MD  Date of service: 11/13/2020     Chief complaint/ Reason for consult:     · Acute respiratory failure with hypoxia  · COVID-19 pneumonia  · Elevated D-dimer  · Elevated CRP in setting of COVID-19  · Leukopenia in setting of COVID-19    Microbiology:        I have reviewed allavailable micro lab data and cultures    Blood culture (2/2) - collected on 10/31/2020: Negative so far    Antibiotics and immunizations:       Current antibiotics: All antibiotics and their doses were reviewed by me    Recent Abx Admin      No antibiotic orders with administrations found. Immunization History: All immunization history was reviewed by me today. There is no immunization history on file for this patient. Known drug allergies: All allergies were reviewed and updated    No Known Allergies    Social history:     Social History:  All social andepidemiologic history was reviewed and updated by me today as needed. · Tobacco use:   reports that she has quit smoking. She has never used smokeless tobacco.  · Alcohol use:   reports current alcohol use. · Currently lives in[address] [place]  has no history on file for drug. Assessment:     The patient is a 67 y.o. old female who  has a past medical history of Back pain, Cancer ([address] Essential hypertension, Headache, Hypothyroidism, Migraines, Thyroid disease, and UTI (lower urinary tract infection). with following problems:    · Acute respiratory failure with hypoxia-now on just 4 L daily-resolved  · COVID pneumonia - improving  · Elevated D-dimer - improving  · Elevated CRP in setting of COVID-19-monitor lab trends  · Leukopenia in setting of COVID-19-resolved   · Essential hypertension- BP ok  · Hypothyroidism  · Ex-smoker  · Overweight* due to excess calorie intake :  Body mass index is 29.18 kg/m². Discussion:      The patient is afebrile. Has already completed 10-day course of IV remdesivir. Serum creatinine 0.5. White cell count is 10,700. Oxygen requirement has improved nicely. She is on 1 L oxygen via nasal cannula    Plan:     Diagnostic Workup:    · Continue to follow  fever curve, WBC count and blood cultures. Antimicrobials:    · Oxygen requirement has improved nicely  · Can plan for oral Decadron taper on discharge  If the patient continues to do well, can plan for discharge home with home isolation precautions. For symptomatic COVID-19 positive individuals, according to the recent recommendations from [facility] dated July 22, 2020, accumulating evidence supports ending isolation precautions for persons with COVID-19 using a symptom-based strategy. Concentrations of SARS-CoV-2 RNA measured in upper respiratory specimens decline after onset of symptoms ([facility], unpublished data, 2020; Sarah Duenass al., 2020; Young et al., 4720; Piero Back et al., 2020; Adolfo Fletcher et al., 6425; Maryruth Dakin et al., 6199). The likelihood of recovering replication-competent virus also declines after onset of symptoms. For patients with mild to moderate AVQUK-46, replication-competent virus has not been recovered after 10 days following symptom onset ([facility], unpublished data, 2020; Adolfo Fletcher et al., 2020; Dhaval et al., 2020; Yunior et al., 2020; Dayana et al., 2020; Young et al., 2020; [address]). A large contact tracing study demonstrated that high-risk household and hospital contacts did not develop infection if their exposure to a case patient started 6 days or more after the case patients illness onset ([facility], 2020). Although replication-competent virus was not isolated 3 weeks after symptom onset, recovered patients can continue to have SARS-CoV-2 RNA detected in their upper respiratory specimens for up to 12 weeks ([facility], 2020; Ray Cramer et al., 4697; Jesu Wang et al, 2020).  For details of above references, data including labs, cultures, imaging, development and implementation of treatment plan and coordination of complex care). Thanks for allowing me to participate in your patient's care. I will sign off today, but will be available to answer any further questions or concerns that may arise during patient's stay in the hospital.        Subjective: Interval history: Interval history was obtained from chart review and RN. Patient is afebrile. She is on 1 L O2    REVIEW OF SYSTEMS:        Review of Systems   Constitutional: Negative for chills, diaphoresis and unexpected weight change. HENT: Negative for congestion, ear discharge, ear pain, facial swelling, hearing loss, rhinorrhea and trouble swallowing. Eyes: Negative for photophobia, discharge, redness and visual disturbance. Respiratory: Positive for cough. Negative for apnea, choking, chest tightness, shortness of breath and stridor. Cardiovascular: Negative for chest pain and palpitations. Gastrointestinal: Negative for abdominal pain, blood in stool, diarrhea and nausea. Endocrine: Negative for polydipsia, polyphagia and polyuria. Genitourinary: Negative for difficulty urinating, dysuria, frequency, hematuria, menstrual problem and vaginal discharge. Musculoskeletal: Negative for arthralgias, joint swelling, myalgias and neck stiffness. Skin: Negative for color change and rash. Allergic/Immunologic: Negative for immunocompromised state. Neurological: Negative for dizziness, seizures, speech difficulty, light-headedness and headaches. Hematological: Negative for adenopathy. Psychiatric/Behavioral: Negative for agitation, hallucinations and suicidal ideas. Past Medical History: All past medical history reviewed today.     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 Past Surgical History: All past surgical history was reviewed today. Past Surgical History:   Procedure Laterality Date    BLADDER SURGERY  08/2016    restructured bladder     MOHS SURGERY  01/10/2019    right jawline anterior inferior       Family History: All family history was reviewed today. Problem Relation Age of Onset    Heart Disease Daughter        Objective:       PHYSICAL EXAM:      Vitals:   Vitals:    11/13/20 0846 11/13/20 1100 11/13/20 1220 11/13/20 1307   BP: (!) 93/53 116/64 122/69    Pulse: 68 61 61    Resp: 27  24 28   Temp: 96.8 °F (36 °C)  97.3 °F (36.3 °C)    TempSrc: Temporal  Temporal    SpO2: 96%  94% 98%   Weight:       Height:           Physical Exam       PHYSICAL EXAM:     In-person bedside physical examination deferred. Pursuant to the emergency declaration under the [address] [place] [address] and the [employer] and [employer] Act, this clinical encounter was conducted to provide necessary medical care. (Also consistent with new provisions and guidance offered by [facility] on March 18, 2020 in setting of COVID 19 outbreak and in order to preserve personal protective equipment in accordance with the flexibilities announced by CMS on March 30, 2020)   References: https://[facility]. [facility]/Portals/0/Resources/COVID-19/3_18%20Telemed%20Guidance%20Updated%20March%2018. pdf?oqr=[email or web address]                      https://[facility]. [facility]/Portals/0/Resources/COVID-19/3_18%20Telemed%20Guidance%20Updated%20March%2018. pdf?axy=[email or web address]                      [email or web address] pdf                            General: Awake and oriented to time place and person according to primary service, vitals reviewed  HEENT: Deferred  Cardiovascular: Telemetry data reviewed, rest deferred   Pulmonary: deferred  Abdomen/GI: deferred  Neuro: deferred  Skin: deferred  Musculoskeletal:  deferred  Genitourinary: Deferred  Psych: deferred  Lymphatic/Immunologic: deferred  Intake and output:    I/O last 3 completed shifts: In: 18 [P.O.:480]  Out: -     Lab Data:   All available labs and old records have been reviewed by me. CBC:  Recent Labs     11/13/20  0420   WBC 10.7   RBC 4.30   HGB 13.2   HCT 38.9      MCV 90.5   MCH 30.7   MCHC 33.9   RDW 13.6   BANDSPCT 1        BMP:  Recent Labs     11/11/20  0510 11/12/20  0450 11/13/20  0420    137 138   K 4.2 4.3 4.4    104 105   CO2 24 25 27   BUN 28* 25* 25*   CREATININE 0.5* <0.5* <0.5*   CALCIUM 8.7 8.5 8.4   GLUCOSE 101* 89 104*        Hepatic Function Panel:   Lab Results   Component Value Date    ALKPHOS 70 11/05/2020    ALT 35 11/05/2020    AST 19 11/05/2020    PROT 5.7 11/05/2020    BILITOT 0.5 11/05/2020    LABALBU 2.6 11/05/2020       CPK: No results found for: CKTOTAL  ESR: No results found for: SEDRATE  CRP:   Lab Results   Component Value Date    CRP 31.4 (H) 11/11/2020           Imaging: All pertinent images and reports for the current visit were reviewed by me during this visit. XR CHEST PORTABLE   Final Result   Right upper extremity PICC line extends to expected location of cavoatrial   junction. No significant interval change in multifocal bilateral airspace disease as   compared to prior. XR CHEST PORTABLE   Final Result   Unchanged chest over the past 3 days with diffuse pulmonary disease, favored   to be related to pneumonia         XR CHEST PORTABLE   Final Result   Multifocal airspace opacities, slightly progressed on the right. XR CHEST PORTABLE   Final Result   No acute interval change.   Bilateral airspace disease is similar to prior   exam.         XR CHEST PORTABLE   Final Result   Multifocal ground-glass opacification likely pneumonia including atypical   viral pneumonia. Pulmonary edema is a less likely etiology for the findings. Medications: All current and past medications were reviewed.  [START ON 11/14/2020] dexamethasone  6 mg Intravenous Daily    apixaban  2.5 mg Oral BID    lidocaine 1 % injection  5 mL Intradermal Once    sodium chloride flush  10 mL Intravenous 2 times per day    levothyroxine  112 mcg Oral Daily    venlafaxine  225 mg Oral Daily    aspirin  81 mg Oral Daily    calcium elemental  500 mg Oral Daily    sodium chloride flush  10 mL Intravenous 2 times per day    albuterol sulfate HFA  2 puff Inhalation 4x daily    And    ipratropium  2 puff Inhalation 4x daily           sodium chloride flush, calcium carbonate, lip balm petroleum free, sodium chloride, albuterol sulfate HFA, ALPRAZolam, sodium chloride flush, acetaminophen **OR** acetaminophen, magnesium hydroxide, promethazine **OR** ondansetron, sodium chloride, potassium chloride **OR** potassium alternative oral replacement **OR** potassium chloride, labetalol      Problem list:       Patient Active Problem List   Diagnosis Code    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 Although every effort was made to ensure the accuracy of this automated transcription, some errors in transcription may have occurred inadvertently. If you may need any clarification, please do not hesitate to contact me through EPIC or at the phone number provided below with my electronic signature.   Any pictures or media included in this note were obtained after taking informed verbal consent from the patient and with their approval to include those in the patient's medical record.     Juan Luis Landon MD, MPH  11/13/2020 , 3:35 PM   [facility] Infectious Disease   [address] [place] [address], [address] [place] [address]  Office: [phone]  Fax: [phone]  Clinic days:  Tuesday & Thursday

## 2020-11-13 NOTE — PROGRESS NOTES
Shift assessment complete. VSS, see doc flowsheets. Pt  Back in bed, tolerated ambulation poorly. Very SOB with exertion. Recovered quickly. Pt states she feels extremely tired. Medications administered per MAR. Fall precautions in place, hourly rounding, call light and belongings in reach, bed in lowest position, wheels locked in place, side rails up x 2, walkways free of clutter, bed alarm on. No further needs at this time, will continue to monitor.

## 2020-11-13 NOTE — PLAN OF CARE
Problem: Falls - Risk of:  Goal: Will remain free from falls  Description: Will remain free from falls  11/12/2020 2315 by Brandin Luis RN  Outcome: Ongoing     Problem: Falls - Risk of:  Goal: Absence of physical injury  Description: Absence of physical injury  11/12/2020 2315 by Brandin Luis RN  Outcome: Ongoing     Problem: Airway Clearance - Ineffective  Goal: Achieve or maintain patent airway  11/12/2020 2315 by Brandin Luis RN  Outcome: Ongoing     Problem: Gas Exchange - Impaired  Goal: Absence of hypoxia  11/12/2020 2315 by Brandin Luis RN  Outcome: Ongoing     Problem: Gas Exchange - Impaired  Goal: Promote optimal lung function  11/12/2020 2315 by Brandin Luis RN  Outcome: Ongoing     Problem: Breathing Pattern - Ineffective  Goal: Ability to achieve and maintain a regular respiratory rate  11/12/2020 2315 by Brandin Luis RN  Outcome: Ongoing     Problem: Body Temperature -  Risk of, Imbalanced  Goal: Ability to maintain a body temperature within defined limits  11/12/2020 2315 by Brandin Luis RN  Outcome: Ongoing     Problem: Body Temperature -  Risk of, Imbalanced  Goal: Will regain or maintain usual level of consciousness  11/12/2020 2315 by Brandin Luis RN  Outcome: Ongoing     Problem:  Body Temperature -  Risk of, Imbalanced  Goal: Complications related to the disease process, condition or treatment will be avoided or minimized  11/12/2020 2315 by Brandin Luis RN  Outcome: Ongoing     Problem: Isolation Precautions - Risk of Spread of Infection  Goal: Prevent transmission of infection  11/12/2020 2315 by Brandin Luis RN  Outcome: Ongoing     Problem: Nutrition Deficits  Goal: Optimize nutrtional status  11/12/2020 2315 by Brandin Luis RN  Outcome: Ongoing     Problem: Risk for Fluid Volume Deficit  Goal: Maintain normal heart rhythm  11/12/2020 2315 by Brandin Luis RN  Outcome: Ongoing     Problem: Risk for Fluid Volume Deficit  Goal: Maintain absence of muscle cramping  11/12/2020 2315 by Ina Melendez RN  Outcome: Ongoing     Problem: Risk for Fluid Volume Deficit  Goal: Maintain normal serum potassium, sodium, calcium, phosphorus, and pH  11/12/2020 2315 by Ina Melendez RN  Outcome: Ongoing     Problem: Loneliness or Risk for Loneliness  Goal: Demonstrate positive use of time alone when socialization is not possible  11/12/2020 2315 by Ina Melendez RN  Outcome: Ongoing     Problem: Fatigue  Goal: Verbalize increase energy and improved vitality  11/12/2020 2315 by Ina Melendez RN  Outcome: Ongoing     Problem: Patient Education: Go to Patient Education Activity  Goal: Patient/Family Education  11/12/2020 2315 by Ina Melendez RN  Outcome: Ongoing     Problem: Pain:  Goal: Pain level will decrease  Description: Pain level will decrease  11/12/2020 2315 by Ina Melendez RN  Outcome: Ongoing     Problem: Pain:  Goal: Control of acute pain  Description: Control of acute pain  11/12/2020 2315 by Ina Melendez RN  Outcome: Ongoing     Problem: Pain:  Goal: Control of chronic pain  Description: Control of chronic pain  11/12/2020 2315 by Ina Melendez RN  Outcome: Ongoing     Problem: Skin Integrity:  Goal: Will show no infection signs and symptoms  Description: Will show no infection signs and symptoms  11/12/2020 2315 by Ina Melendez RN  Outcome: Ongoing     Problem: Skin Integrity:  Goal: Absence of new skin breakdown  Description: Absence of new skin breakdown  11/12/2020 2315 by Ina Melendez RN  Outcome: Ongoing     Problem: Musculor/Skeletal Functional Status  Goal: Highest potential functional level  11/12/2020 2315 by Ina Melendez RN  Outcome: Ongoing     Problem: Musculor/Skeletal Functional Status  Goal: Absence of falls  11/12/2020 2315 by Ina Melendez RN  Outcome: Ongoing

## 2020-11-13 NOTE — PROGRESS NOTES
[facility] Pulmonary and Critical Care    Follow Up Note    Subjective:   CHIEF COMPLAINT / HPI:   Chief Complaint   Patient presents with    Shortness of Breath     Pt brought by [facility] from home with CC of SOB that started Tuesday. Pt reports being dx'ed with COVID on Tuesday reports recent admission at [facility]. pt on RA 88%, 94%on 2L. Interval history: Patient's respiratory status continues to improve. Now down to 2 L/min oxygen supplementation with SPO2 of 96%. Does not appear to be in any respiratory distress. Finished remdesivir course. Continues on Decadron. Past Medical History:    Reviewed; no changes    Social History:    Reviewed; no changes    REVIEW OF SYSTEMS:    CONSTITUTIONAL:  negative for fevers and chills  RESPIRATORY:  See HPI  CARDIOVASCULAR:  negative for chest pain, palpitations, edema  GASTROINTESTINAL:  negative for nausea, vomiting, diarrhea, constipation and abdominal pain    Objective:   PHYSICAL EXAM:        VITALS:  /64   Pulse 61   Temp 96.8 °F (36 °C) (Temporal)   Resp 27   Ht 5' 4\" (1.626 m)   Wt 170 lb (77.1 kg)   SpO2 96%   BMI 29.18 kg/m²  on heated high flow 100% and 60 L/min     24HR INTAKE/OUTPUT:      Intake/Output Summary (Last 24 hours) at 11/13/2020 1212  Last data filed at 11/13/2020 0849  Gross per 24 hour   Intake 720 ml   Output --   Net 720 ml       PHYSICAL EXAM:     In-person bedside physical examination deferred. Pursuant to the emergency declaration under the [address] [place] [address], [address] [place] [address] and the [employer] and [employer] Act, this clinical encounter was conducted to provide necessary medical care.    (Also consistent with new provisions and guidance offered by [facility] on March 18, 2020 in setting of COVID 19 outbreak and in order to preserve personal protective equipment in accordance with the flexibilities announced by CMS on March 30, 2020) References: https://[facility]. [facility]/Portals/0/Resources/COVID-19/3_18%20Telemed%20Guidance%20Updated%20March%2018. pdf?ybt=[email or web address]                      https://[facility]/Portals/0/Resources/COVID-19/3_18%20Telemed%20Guidance%20Updated%20March%2018. pdf?wfg=[email or web address]                      http://[employer]/. pdf                            General: Awake and oriented to time place and person according to primary service, vitals reviewed  HEENT: Deferred  Cardiovascular: Telemetry data reviewed, rest deferred   Pulmonary: deferred  Abdomen/GI: deferred  Neuro: deferred  Skin: deferred  Musculoskeletal:  deferred  Genitourinary: Deferred  Psych: deferred  Lymphatic/Immunologic: deferred      DATA:    CBC:  Recent Labs     11/13/20  0420   WBC 10.7   RBC 4.30   HGB 13.2   HCT 38.9      MCV 90.5   MCH 30.7   MCHC 33.9   RDW 13.6   BANDSPCT 1      BMP:  Recent Labs     11/11/20  0510 11/12/20  0450 11/13/20  0420    137 138   K 4.2 4.3 4.4    104 105   CO2 24 25 27   BUN 28* 25* 25*   CREATININE 0.5* <0.5* <0.5*   CALCIUM 8.7 8.5 8.4   GLUCOSE 101* 89 104*      ABG:  No results for input(s): PHART, YRT0LSM, PO2ART, WQE9FCJ, N4LIBXTK, BEART in the last 72 hours. Cultures:    Abx:    Radiology Review:  Pertinent images / reports were reviewed as a part of this visit. Assessment:     1. Acute hypoxemic respiratory failure  2. SARS-CoV-2 pneumonia  3. ARDS    I have reviewed laboratories, medical records and images for this visit  Patient has Covid 19 related pneumonia which is slowly improving. Oxygen requirement has decreased to 2 L/min. Saturations remain in the high 90s  Continue to wean oxygen as tolerated to keep SPO2 above 89%  She has finished a course of remdesivir  IV Decadron changed to 6 mg IV daily. He can be changed to prednisone  40 mg p.o. daily at the time of discharge.    Prednisone can be tapered or albuterol 7 to 10 days. Inflammatory markers are trending down. D-dimer has come down. Transition patient to Eliquis 2.5 mg p.o. twice daily. Patient will need to stay on this anticoagulation for at least 2 weeks. Encourage patient to take p.o. diet. PT/OT. Out of bed into chair. No further to add from pulmonary standpoint. Pulmonary critical care will sign off at this time.             Jermaine Hurst MD   Pulmonary Critical Care and Sleep Medicine  [facility]   [address], [address] [place] [address] Drive  10/31/2020, 12:12 PM

## 2020-11-13 NOTE — CARE COORDINATION
CM aware of durable medical equipment order for wheelchair. Manisha [place] with [facility] notified. Patient will have a home O2 eval today, Fernie Tripathi is following for needs.     ERIBERTO LandonN, CCM, RN  [facility]  [phone]
CM received a call from patient's daughter about possible discharge this day. CM explained that we do not yet have orders but the physician was considering this. Daughter asked about home care, CM advised that [facility]) will see patient at discharge and how home care works. CM explained an oxygen evaluation would be completed and if her mother needs home O2 we will arrange this and give further instructions. Daughter had multiple other questions about mother's post discharge needs such as re-testing or isolation practices. CM referred her to patient's nurse and called nursing to please speak with family.     Aquilino Lora BSN, CCM, RN  [facility]  [phone]
Chart reviewed for discharge planning. Barrier(s) to discharge-remains on Remdesivir day 9, decadron iv, AirVo 45 liters    Tentative discharge plan-[facility] following, requires pre-cert    Tentative discharge date-when medically stable    *Case management will continue to follow progress and update discharge plan as needed.     Lizandro Aviles, BSN, CCM, RN  [facility]  [phone]
Chart reviewed for discharge planning. Barrier(s) to discharge-remains on high volume Oxygen 60 liters    Tentative discharge plan-[facility] following    Tentative discharge date-when medically stable    *Case management will continue to follow progress and update discharge plan as needed.       ERIBERTO HuiN, CCM, RN  [facility]  [phone]
Discharge planning note:    Chart reviewed for discharge planning. Barrier(s) to discharge-  Vapotherm 40%, IV Decadron    Tentative discharge plan- precert pending to [facility]    Tentative discharge date-TBD    Spoke with daughter Keri Ibanez on telephone. Family prefers patient go home w/c - family will provide 24/7 assistance. They are open to talking about LTAC. They are discussing among themselves and will talk to CM later in week    Case management will continue to follow progress and update discharge plan as needed.     Kelsey Huerta RN BSN  Case Management  [phone]
Discharge planning note:    Plan will be for patient to discharge home w/family in a day or two once oxygen is down to 2 -3 L. .   Will likely need Home O2. [facility] Marvin therapy will be beneficial and family will be provided 24/7 care for as long as needed. Referral to [facility]. Discussed plan of care with daughter Shannan Prater and she is in agreement. If patient should have complications and need LTAC in future - we will need to Refer to [facility] as [facility] is out of network.     Robbin Le RN BSN  Case Management  [phone]
Eve/Dotty received a referral to this patient for home 02 @ 3 lpm cont via nc. And a wheelchair  Home 02 has been arranged for delivery. Pt's daughter is aware to call Eve [phone] to initiate setup. Portable tank has been delivered to the floor  Wheelchair will be delivered to pts home along with 02 equipment. Thank you for the referral.  Electronically signed by Isi Mendoza on 11/13/2020 at 5:29 PM  Cell ph# [phone]    NOTE: After 5:00 pm, Weekends, Holidays: Call Dotty[facility] [employer] at [phone] to coordinate delivery of home medical equipment.
SW attempted to contact pt in room but no answer. SW contacted pt's RN who stated pt is alert and oriented but very weak and sick. Stated pt is on vapotherm and transferring to [facility] soon for more intensive care. SW to attempt assessment at a later time.     Electronically signed by REID Chacon LSW on 11/2/2020 at 4:35 PM
POLST/POST/MOLST/MOST prepared for Provider review and signature      Follow-up plan:    [] Schedule follow-up conversation to continue planning  [] Referred individual to Provider for additional questions/concerns   [] Advised patient/agent/surrogate to review completed ACP document and update if needed with changes in condition, patient preferences or care setting    [] This note routed to one or more involved healthcare providers  Daughter reports that parent has a living will, she will e-mail it to this writer later today for addition to the medical record.     Jesenia Xiong, BSN, CCM, RN  [facility]  [phone]

## 2020-11-13 NOTE — PROGRESS NOTES
Reassessment complete. VSS, see doc flowsheets. Oxygen titrated to 4L, tolerating well. Fall precautions in place, hourly rounding, call light and belongings in reach, bed in lowest position, wheels locked in place, side rails up x 2, walkways free of clutter, bed alarm on. No acute changes, will continue to monitor.

## 2020-11-13 NOTE — FLOWSHEET NOTE
11/13/20 1125   Provider Notification   Reason for Communication Evaluate   Provider Name Lulu Lowing   Provider Notification Physician   Method of Communication Secure Message   Response Waiting for response   Notification Time 1125     \"BP improved to116/64 (80) and HR 61 with no intervention. Do you still want us to give IVF bolus? \"

## 2020-11-14 ENCOUNTER — CARE COORDINATION (OUTPATIENT)
Dept: CASE MANAGEMENT | Age: 80
End: 2020-11-14

## 2020-11-14 NOTE — CARE COORDINATION
[facility] Initial Outreach    Call within 2 business days of discharge: Yes    Patient: Abhijit Valladares Patient : 1940   MRN: <[id number]>  Discharge Date: 20   RARS: Readmission Risk Score: 12      Last Discharge [facility]       Complaint Diagnosis Description Type Department Provider    10/31/20 Shortness of Breath COVID-19 virus infection . .. ED to Hosp-Admission (Discharged) (ADMITTED) F 5C Tian Felix MD; Sharlene Sheldon, ... COVID-19 Detected on 10/27/2020 at [facility] (per chart). Patient has following COVID-19 risk factors: h/o cancer. 1st attempt - Unable to reach patient by phone. Voice mailbox was full so unable to leave a message. Per Yohannes Baca in intake at [facility], referral received. Follow Up  No future appointments.     Hallie Mars RN  Care Transition Nurse  [phone] mobile

## 2020-11-16 ENCOUNTER — CARE COORDINATION (OUTPATIENT)
Dept: CASE MANAGEMENT | Age: 80
End: 2020-11-16

## 2020-11-20 ENCOUNTER — HOSPITAL ENCOUNTER (OUTPATIENT)
Age: 80
Setting detail: SPECIMEN
Discharge: HOME | End: 2020-11-20
Payer: MEDICARE

## 2020-11-20 LAB
AMORPHOUS: ABNORMAL /HPF
BACTERIA: ABNORMAL /HPF
BILIRUBIN URINE: NEGATIVE
BLOOD, URINE: NEGATIVE
CLARITY: ABNORMAL
COLOR: YELLOW
CRYSTALS, UA: ABNORMAL /HPF
EPITHELIAL CELLS, UA: 4 /HPF (ref 0–5)
GLUCOSE URINE: NEGATIVE MG/DL
KETONES, URINE: NEGATIVE MG/DL
LEUKOCYTE ESTERASE, URINE: ABNORMAL
MICROSCOPIC EXAMINATION: YES
NITRITE, URINE: NEGATIVE
PH UA: 7 (ref 5–8)
PROTEIN UA: ABNORMAL MG/DL
RBC UA: ABNORMAL /HPF (ref 0–4)
SPECIFIC GRAVITY UA: 1.02 (ref 1–1.03)
URINE TYPE: ABNORMAL
UROBILINOGEN, URINE: 0.2 E.U./DL
WBC UA: 73 /HPF (ref 0–5)

## 2020-11-20 PROCEDURE — 87086 URINE CULTURE/COLONY COUNT: CPT

## 2020-11-20 PROCEDURE — 87077 CULTURE AEROBIC IDENTIFY: CPT

## 2020-11-20 PROCEDURE — 81001 URINALYSIS AUTO W/SCOPE: CPT

## 2020-11-20 PROCEDURE — 87186 SC STD MICRODIL/AGAR DIL: CPT

## 2020-11-22 LAB
ORGANISM: ABNORMAL
ORGANISM: ABNORMAL
URINE CULTURE, ROUTINE: ABNORMAL
URINE CULTURE, ROUTINE: ABNORMAL

## 2021-05-03 ENCOUNTER — OFFICE VISIT (OUTPATIENT)
Dept: DERMATOLOGY | Age: 81
End: 2021-05-03
Payer: MEDICARE

## 2021-05-03 VITALS — TEMPERATURE: 97.2 F

## 2021-05-03 DIAGNOSIS — D23.9 DERMATOFIBROMA: ICD-10-CM

## 2021-05-03 DIAGNOSIS — L82.0 INFLAMED SEBORRHEIC KERATOSIS: ICD-10-CM

## 2021-05-03 DIAGNOSIS — L57.0 AK (ACTINIC KERATOSIS): ICD-10-CM

## 2021-05-03 DIAGNOSIS — L82.1 SEBORRHEIC KERATOSIS: ICD-10-CM

## 2021-05-03 DIAGNOSIS — D22.9 MULTIPLE NEVI: Primary | ICD-10-CM

## 2021-05-03 DIAGNOSIS — Z85.828 HISTORY OF NONMELANOMA SKIN CANCER: ICD-10-CM

## 2021-05-03 PROCEDURE — G8417 CALC BMI ABV UP PARAM F/U: HCPCS | Performed by: DERMATOLOGY

## 2021-05-03 PROCEDURE — 1123F ACP DISCUSS/DSCN MKR DOCD: CPT | Performed by: DERMATOLOGY

## 2021-05-03 PROCEDURE — 1090F PRES/ABSN URINE INCON ASSESS: CPT | Performed by: DERMATOLOGY

## 2021-05-03 PROCEDURE — G8400 PT W/DXA NO RESULTS DOC: HCPCS | Performed by: DERMATOLOGY

## 2021-05-03 PROCEDURE — 17110 DESTRUCTION B9 LES UP TO 14: CPT | Performed by: DERMATOLOGY

## 2021-05-03 PROCEDURE — 1036F TOBACCO NON-USER: CPT | Performed by: DERMATOLOGY

## 2021-05-03 PROCEDURE — G8427 DOCREV CUR MEDS BY ELIG CLIN: HCPCS | Performed by: DERMATOLOGY

## 2021-05-03 PROCEDURE — 4040F PNEUMOC VAC/ADMIN/RCVD: CPT | Performed by: DERMATOLOGY

## 2021-05-03 PROCEDURE — 99213 OFFICE O/P EST LOW 20 MIN: CPT | Performed by: DERMATOLOGY

## 2021-05-03 NOTE — PROGRESS NOTES
[facility]ermat[facility]  Agatha Goltz, MD  [phone]      Bridgette Ng  1940    79 y.o. female     Date of Visit: 5/3/2021    Chief Complaint: f/u skin cancer, AK's, lesions  Chief Complaint   Patient presents with    Skin Exam     FSE        HX: multi nevi     Last seen:   *had COVID in late 2020 - hospitalized for 3+ weeks  *ANYA  from [place]     History of Present Illness:    1. She is here for evaluation of multiple asx pigmented lesions on the trunk and extremities, present for many years; no change in size/shape/color of any lesions; no bleeding lesions. Firm lesion on the shoulder x many years - asx. 2. Hx of NMSC (BCC on the L medial cheek treated with Mohs and more recently SCC on the R jawline that was treated with Mohs by Dr. Nona Rasmussen ) - Here for f/u and skin check. No concerns with previous sites or new lesions noted except for noted below. 3. Hx of AK. No new concerns. 4. C/o irritated and itchy lesion on the  L thigh. Bx's ():  Chest-actinic keratosis  Lt post thigh-benign    Derm Hx:  Hx of intermittent eyelid pruritus and scaling, previously diagnosed as dermatitis - cleared with eliveronica in the past.     Friend of Dariana Morocho and   Jose Alejandro Dumont. Her grandson was killed in a car wreck in  ( football player). Review of Systems:  Gen: Feels well, good sense of health. Skin: No changing moles or lesions. No new rashes. Past Medical History, Family History, Surgical History, Medications and Allergies reviewed. 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         Past Surgical History:   Procedure Laterality Date    BLADDER SURGERY  2016    restructured bladder  MOHS SURGERY  01/10/2019    right jawline anterior inferior       Outpatient Medications Marked as Taking for the 5/3/21 encounter (Office Visit) with Joni Roblero MD   Medication Sig Dispense Refill    calcium carbonate (OSCAL) 500 MG TABS tablet Take 500 mg by mouth daily      Omega-3 Fatty Acids (FISH OIL) 1000 MG CAPS Take 3,000 mg by mouth 3 times daily      aspirin 81 MG tablet Take 81 mg by mouth daily      venlafaxine (EFFEXOR-XR) 150 MG XR capsule Take 225 mg by mouth daily Indications: Per PT 3/14/2016      Diclofenac Potassium (CAMBIA PO) Take by mouth      SYNTHROID 112 MCG tablet       trimethoprim (TRIMPEX) 100 MG tablet       ALPRAZolam (XANAX) 0.5 MG tablet Take 0.5 mg by mouth nightly as needed.  Calcium Carbonate-Vit D-Min 600-200 MG-UNIT TABS Take 2,000 Units by mouth. No Known Allergies      Physical Examination     Gen, well-appearing  The following were examined and determined to be normal: Psych/Neuro, Scalp/hair, Conjunctivae/eyelids, Gums/teeth/lips, Abdomen, LLE, Nails/digits and Groin/buttocks. Neck, Breast/axilla/chest, and LUE and RUE.  RLE, back, face  The following were examined and determined to be abnormal: none    trunk and extremities with scattered brown macules and papules   Trunk, arms with stuck-on dull-surfaced pinkish-tan crusted papules  Scar appears clear  No AK's  L thigh with stuck-on dull-surfaced tan papule with peripheral erythema  L shoulder with firm dermal pinkish-brown papule       Assessment and Plan     1. Benign-appearing nevi and SK's and DF on the L shoulder  - educ re si/sx/ABCD's of MM   educ sun protection - OTC sunscreen with SPF 30-50+ recommended and reviewed usage  encouraged skin check yearly (sooner if indicated), self checks     2. Hx of NMSC, no signs recurrence  - educ sun protection   encouraged skin check yearly (sooner if indicated), self checks    3. AK - clear today  - sun protection as above    4.  ISK's - 1 on the L thigh  - 1 lesion on the hairline - lesion(s) on the frontal hairline treated with liquid nitrogen x 2 cycles. Patient educated on risk of blister, hypopigmentation/scar and wound care. SK on the R cheek - LN2 - no extra charge    F/u yearly.

## 2022-02-08 ENCOUNTER — OFFICE VISIT (OUTPATIENT)
Dept: DERMATOLOGY | Age: 82
End: 2022-02-08
Payer: MEDICARE

## 2022-02-08 VITALS — TEMPERATURE: 97.2 F

## 2022-02-08 DIAGNOSIS — D22.9 MULTIPLE NEVI: Primary | ICD-10-CM

## 2022-02-08 DIAGNOSIS — R52 PAIN: ICD-10-CM

## 2022-02-08 DIAGNOSIS — L72.0 MILIA: ICD-10-CM

## 2022-02-08 DIAGNOSIS — L82.1 SEBORRHEIC KERATOSIS: ICD-10-CM

## 2022-02-08 DIAGNOSIS — Z85.828 HISTORY OF NONMELANOMA SKIN CANCER: ICD-10-CM

## 2022-02-08 DIAGNOSIS — D23.9 DERMATOFIBROMA: ICD-10-CM

## 2022-02-08 DIAGNOSIS — L30.9 DERMATITIS: ICD-10-CM

## 2022-02-08 DIAGNOSIS — L57.0 AK (ACTINIC KERATOSIS): ICD-10-CM

## 2022-02-08 DIAGNOSIS — D48.5 NEOPLASM OF UNCERTAIN BEHAVIOR OF SKIN: ICD-10-CM

## 2022-02-08 PROCEDURE — 1036F TOBACCO NON-USER: CPT | Performed by: DERMATOLOGY

## 2022-02-08 PROCEDURE — 1123F ACP DISCUSS/DSCN MKR DOCD: CPT | Performed by: DERMATOLOGY

## 2022-02-08 PROCEDURE — 4040F PNEUMOC VAC/ADMIN/RCVD: CPT | Performed by: DERMATOLOGY

## 2022-02-08 PROCEDURE — 1090F PRES/ABSN URINE INCON ASSESS: CPT | Performed by: DERMATOLOGY

## 2022-02-08 PROCEDURE — G8484 FLU IMMUNIZE NO ADMIN: HCPCS | Performed by: DERMATOLOGY

## 2022-02-08 PROCEDURE — 99213 OFFICE O/P EST LOW 20 MIN: CPT | Performed by: DERMATOLOGY

## 2022-02-08 PROCEDURE — G8427 DOCREV CUR MEDS BY ELIG CLIN: HCPCS | Performed by: DERMATOLOGY

## 2022-02-08 PROCEDURE — 11102 TANGNTL BX SKIN SINGLE LES: CPT | Performed by: DERMATOLOGY

## 2022-02-08 PROCEDURE — G8400 PT W/DXA NO RESULTS DOC: HCPCS | Performed by: DERMATOLOGY

## 2022-02-08 PROCEDURE — 17110 DESTRUCTION B9 LES UP TO 14: CPT | Performed by: DERMATOLOGY

## 2022-02-08 PROCEDURE — G8421 BMI NOT CALCULATED: HCPCS | Performed by: DERMATOLOGY

## 2022-02-08 RX ORDER — ATORVASTATIN CALCIUM 40 MG/1
40 TABLET, FILM COATED ORAL DAILY
COMMUNITY
Start: 2022-01-16

## 2022-02-08 RX ORDER — CLOPIDOGREL BISULFATE 75 MG/1
75 TABLET ORAL DAILY
COMMUNITY
Start: 2022-01-17 | End: 2022-04-17

## 2022-02-08 RX ORDER — TRIAMCINOLONE ACETONIDE 1 MG/G
CREAM TOPICAL
Qty: 60 G | Refills: 0 | Status: SHIPPED | OUTPATIENT
Start: 2022-02-08

## 2022-02-08 RX ORDER — LEVOTHYROXINE SODIUM 0.12 MG/1
TABLET ORAL
COMMUNITY
Start: 2022-02-08

## 2022-02-08 RX ORDER — PANTOPRAZOLE SODIUM 40 MG/1
40 TABLET, DELAYED RELEASE ORAL DAILY
COMMUNITY
Start: 2021-05-12 | End: 2022-09-19

## 2022-02-08 NOTE — PROGRESS NOTES
[facility] Dermatology  Temi Lainez MD  [phone]      Foster Jang  1940    [de-identified] y.o. female     Date of Visit: 2022    Chief Complaint: f/u skin cancer, AK's, lesions  Chief Complaint   Patient presents with    Skin Lesion     Last seen:   *had [place] in late 2020 - hospitalized for 3+ weeks  *ANAY  from [place]     *had ? Stroke or TIA last month 2022 (aphasia) - still in midst of eval    History of Present Illness:    1. She is here for evaluation of multiple asx pigmented lesions on the trunk and extremities, present for many years; no change in size/shape/color of any lesions; no bleeding lesions. Firm lesion on the shoulder x many years - asx. 2. Hx of NMSC (BCC on the L medial cheek treated with Mohs and more recently SCC on the R jawline that was treated with Mohs by Dr. Tinoco  ) - Here for f/u and skin check. No concerns with previous sites or new lesions noted except for noted below. 3. Hx of AK. No new concerns. 4. C/o irritated papules on the face - mainly lateral canthal areas. .    5. C/o itchy area on the R chest recently. No trx tried. 6. She has a concerning pink spot on the R shin. Asx. Bx's ():  Chest-actinic keratosis  Lt post thigh-benign    Derm Hx:  Hx of intermittent eyelid pruritus and scaling, previously diagnosed as dermatitis - cleared with elidel in the past.     Friend of Valentin Zhu and   Augustina Singer. Her grandson was killed in a car wreck in  ( football player). Review of Systems:  Gen: Feels well, good sense of health. Skin: No changing moles or lesions. No new rashes. Past Medical History, Family History, Surgical History, Medications and Allergies reviewed.     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 Past Surgical History:   Procedure Laterality Date    BLADDER SURGERY  08/2016    restructured bladder     MOHS SURGERY  01/10/2019    right jawline anterior inferior       Outpatient Medications Marked as Taking for the 2/8/22 encounter (Office Visit) with Jonnathan Alvarez MD   Medication Sig Dispense Refill    atorvastatin (LIPITOR) 40 MG tablet Take 40 mg by mouth daily      clopidogrel (PLAVIX) 75 MG tablet Take 75 mg by mouth daily      Cholecalciferol 75 MCG (3000 UT) TABS Take 2,000 Units by mouth daily      levothyroxine (SYNTHROID) 125 MCG tablet       pantoprazole (PROTONIX) 40 MG tablet Take 40 mg by mouth daily      calcium carbonate (OSCAL) 500 MG TABS tablet Take 500 mg by mouth daily      aspirin 81 MG tablet Take 81 mg by mouth daily      venlafaxine (EFFEXOR-XR) 150 MG XR capsule Take 225 mg by mouth daily Indications: Per PT 3/14/2016      Calcium Carbonate-Vit D-Min 600-200 MG-UNIT TABS Take 2,000 Units by mouth. No Known Allergies      Physical Examination     Gen, well-appearing  FSE today    trunk and extremities with scattered brown macules and papules   Trunk, arms with stuck-on dull-surfaced pinkish-tan crusted papules  Scar appears clear  No AK's  R shin with pink shiny macule  Face with numerous whitish cystic papule, several clustered at the lateral canthal areas  L shoulder with firm dermal pinkish-brown papule   R chest with few dry dull tan papules and mild dryness      Assessment and Plan     1. Benign-appearing nevi and SK's and DF on the L shoulder  - educ re si/sx/ABCD's of MM   educ sun protection - OTC sunscreen with SPF 30-50+ recommended and reviewed usage  encouraged skin check yearly (sooner if indicated), self checks     2. Hx of NMSC, no signs recurrence  - educ sun protection   encouraged skin check yearly (sooner if indicated), self checks    3.  AK - clear today  - sun protection as above    4. Milia - canthal area - irritated  - 7 lesions extracted/destroyed with 11 blade and pressure    5. R chest - itchy - few SK's but more diffuse - likely mild dermatitis  -  trial TAC bid prn flares; ed se/misuse  - ed DSC    6. R shin - r/o BCC  - Shave biopsy performed after verbal consent obtained. Patient educated regarding risk of bleeding, infection, scar and educated on wound care. Skin cleansed with alcohol pad and site anesthetized with lido + epi. Aluminum chloride applied to site for hemostasis. Petrolatum ointment and bandage applied. Specimen bottle labeled with patient information and site and specimen sent to [facility]. F/u yearly.

## 2022-02-08 NOTE — PATIENT INSTRUCTIONS
Biopsy Wound Care Instructions    · Keep the bandage in place for 24 hours. · Cleanse the wound with mild soapy water daily   Gently dry the area.  Apply Vaseline or petroleum jelly to the wound using a cotton tipped applicator.  Cover with a clean bandage.  Repeat this process until the biopsy site is healed.  If you had stitches placed, continue treating the site until the stitches are removed. Remember to make an appointment to return to have your stitches removed by our staff.  You may shower and bathe as usual.       ** Biopsy results generally take around 7 business days to come back. If you have not heard from us by then, please call the office at [phone]. *Please note that biopsy results are released to both the patient and physician at the same time in [address] Please allow time for your physician to review the results. One of our staff members will reach out to you with the results and plan.

## 2022-02-10 LAB — DERMATOLOGY PATHOLOGY REPORT: ABNORMAL

## 2022-03-28 ENCOUNTER — OFFICE VISIT (OUTPATIENT)
Dept: DERMATOLOGY | Age: 82
End: 2022-03-28
Payer: MEDICARE

## 2022-03-28 VITALS — TEMPERATURE: 97.7 F

## 2022-03-28 DIAGNOSIS — C44.712 BASAL CELL CARCINOMA (BCC) OF RIGHT LOWER LEG: Primary | ICD-10-CM

## 2022-03-28 PROCEDURE — 17262 DSTRJ MAL LES T/A/L 1.1-2.0: CPT | Performed by: DERMATOLOGY

## 2022-03-28 NOTE — PROGRESS NOTES
[facility]ermat[facility]  Cathy Morel MD  [phone]      Dominick Shah  1940    [de-identified] y.o. female     Date of Visit: 3/28/2022    Chief Complaint: [address]  Chief Complaint   Patient presents with    Basal Cell Carcinoma     curettage right shin    Other     TIA after patient's last visit     Last seen:   *had [place] in late 2020 - hospitalized for 3+ weeks  *ANYA  from [place]     *had ? Stroke or TIA 2022 (aphasia) - better    History of Present Illness:    Here for trx of BCC on the R shin. bx last visit - read as sup and nodular - small and no probs since. Bx's ():  Chest-actinic keratosis  Lt post thigh-benign    Derm Hx:  Hx of intermittent eyelid pruritus and scaling, previously diagnosed as dermatitis - cleared with elidel in the past.     Friend of Jeannette Valdez and   Tyrese Oliveros. Her grandson was killed in a car wreck in  ( football player). Review of Systems:  Gen: Feels well, good sense of health. Past Medical History, Family History, Surgical History, Medications and Allergies reviewed. 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         Past Surgical History:   Procedure Laterality Date    BLADDER SURGERY  2016    restructured bladder     MOHS SURGERY  01/10/2019    right jawline anterior inferior       Outpatient Medications Marked as Taking for the 3/28/22 encounter (Office Visit) with Ivett Schuler MD   Medication Sig Dispense Refill    atorvastatin (LIPITOR) 40 MG tablet Take 40 mg by mouth daily      clopidogrel (PLAVIX) 75 MG tablet Take 75 mg by mouth daily      Cholecalciferol 75 MCG (3000 UT) TABS Take 2,000 Units by mouth daily      levothyroxine (SYNTHROID)

## 2022-03-28 NOTE — PATIENT INSTRUCTIONS
Wound Care Instructions    · Keep the bandage in place for 24 hours. · Cleanse the wound with mild soapy water daily   Gently dry the area.  Apply Vaseline or petroleum jelly to the wound using a cotton tipped applicator.  Cover with a clean bandage.  Repeat this process until the biopsy site is healed.  If you had stitches placed, continue treating the site until the stitches are removed. Remember to make an appointment to return to have your stitches removed by our staff.    You may shower and bathe as usual.

## 2022-09-19 ENCOUNTER — OFFICE VISIT (OUTPATIENT)
Dept: DERMATOLOGY | Age: 82
End: 2022-09-19
Payer: MEDICARE

## 2022-09-19 DIAGNOSIS — L72.0 MILIA: ICD-10-CM

## 2022-09-19 DIAGNOSIS — D22.9 MULTIPLE NEVI: Primary | ICD-10-CM

## 2022-09-19 DIAGNOSIS — Z85.828 HISTORY OF NONMELANOMA SKIN CANCER: ICD-10-CM

## 2022-09-19 DIAGNOSIS — L82.0 INFLAMED SEBORRHEIC KERATOSIS: ICD-10-CM

## 2022-09-19 DIAGNOSIS — L30.9 DERMATITIS: ICD-10-CM

## 2022-09-19 DIAGNOSIS — L57.0 AK (ACTINIC KERATOSIS): ICD-10-CM

## 2022-09-19 DIAGNOSIS — D23.9 DERMATOFIBROMA: ICD-10-CM

## 2022-09-19 DIAGNOSIS — L82.1 SEBORRHEIC KERATOSIS: ICD-10-CM

## 2022-09-19 PROCEDURE — 1036F TOBACCO NON-USER: CPT | Performed by: DERMATOLOGY

## 2022-09-19 PROCEDURE — 99213 OFFICE O/P EST LOW 20 MIN: CPT | Performed by: DERMATOLOGY

## 2022-09-19 PROCEDURE — 1090F PRES/ABSN URINE INCON ASSESS: CPT | Performed by: DERMATOLOGY

## 2022-09-19 PROCEDURE — G8421 BMI NOT CALCULATED: HCPCS | Performed by: DERMATOLOGY

## 2022-09-19 PROCEDURE — G8400 PT W/DXA NO RESULTS DOC: HCPCS | Performed by: DERMATOLOGY

## 2022-09-19 PROCEDURE — 17110 DESTRUCTION B9 LES UP TO 14: CPT | Performed by: DERMATOLOGY

## 2022-09-19 PROCEDURE — 1123F ACP DISCUSS/DSCN MKR DOCD: CPT | Performed by: DERMATOLOGY

## 2022-09-19 PROCEDURE — G8427 DOCREV CUR MEDS BY ELIG CLIN: HCPCS | Performed by: DERMATOLOGY

## 2022-09-19 RX ORDER — APIXABAN 5 MG/1
TABLET, FILM COATED ORAL
COMMUNITY
Start: 2022-08-19

## 2022-09-19 NOTE — PROGRESS NOTES
[facility]ermat[facility]  Lindi Jeans, MD  [phone]      Kwasi Postin  1940    [de-identified] y.o. female     Date of Visit: 2022    Chief Complaint: f/u skin cancer, AK's, lesions  Chief Complaint   Patient presents with    Skin Exam     6 mo FSE      HX:NMSC, Multi Nevi     Last seen: 3-2022  *had COVID in late 2020 - hospitalized for 3+ weeks  *ANYA  from [facility]     *had ? Stroke or TIA last month 2022 (aphasia) - still in midst of eval    History of Present Illness:    1. She is here for evaluation of multiple asx pigmented lesions on the trunk and extremities, present for many years; no change in size/shape/color of any lesions; no bleeding lesions. Firm lesion on the shoulder x many years - asx. 2. Hx of NMSC (BCC on the L medial cheek treated with Mohs and more recently SCC on the R jawline that was treated with Mohs by Dr. Saul Coombs ) - Here for f/u and skin check. R shin - sup and nod BCC - curettage 3-2022  No concerns with previous sites or new lesions noted except for noted below. 3. Hx of AK. No new concerns. 4. Multiple facial milia. Some itchy in the past.  No c/o today. 5. F/u dermatitis - R chest - resolved with TAC. No c/o.    6. She has a few irritated lesions - L wrist and back. Bx's ():  Chest-actinic keratosis  Lt post thigh-benign    Derm Hx:  Hx of intermittent eyelid pruritus and scaling, previously diagnosed as dermatitis - cleared with elidel in the past.     Friend of Ryann Neff and   Katerina Hector. Her grandson was killed in a car wreck in  ( football player). Review of Systems:  Gen: Feels well, good sense of health. Skin: No changing moles or lesions. No new rashes. Past Medical History, Family History, Surgical History, Medications and Allergies reviewed.     Past Medical History:   Diagnosis Date    Back pain     Cancer ([facility] [address]     squamous cell carcinoma and basal cell carcinoma    Essential hypertension     Headache     Hypothyroidism     Migraines     Thyroid disease     UTI (lower urinary tract infection)     treated w/Bactrim per PT. Past Surgical History:   Procedure Laterality Date    BLADDER SURGERY  08/2016    restructured bladder     MOHS SURGERY  01/10/2019    right jawline anterior inferior       Outpatient Medications Marked as Taking for the 9/19/22 encounter (Office Visit) with Craig Byers MD   Medication Sig Dispense Refill    ELIQUIS 5 MG TABS tablet       atorvastatin (LIPITOR) 40 MG tablet Take 40 mg by mouth daily      Cholecalciferol 75 MCG (3000 UT) TABS Take 2,000 Units by mouth daily      levothyroxine (SYNTHROID) 125 MCG tablet       pantoprazole (PROTONIX) 40 MG tablet Take 40 mg by mouth daily      triamcinolone (KENALOG) 0.1 % cream Apply to affected area BID PRN flares 60 g 0    calcium carbonate (OSCAL) 500 MG TABS tablet Take 500 mg by mouth daily      Omega-3 Fatty Acids (FISH OIL) 1000 MG CAPS Take 3,000 mg by mouth 3 times daily       venlafaxine (EFFEXOR-XR) 150 MG XR capsule Take 225 mg by mouth daily Indications: Per PT 3/14/2016      Diclofenac Potassium (CAMBIA PO) Take by mouth       SYNTHROID 112 MCG tablet       trimethoprim (TRIMPEX) 100 MG tablet       ALPRAZolam (XANAX) 0.5 MG tablet Take 0.5 mg by mouth nightly as needed. Calcium Carbonate-Vit D-Min 600-200 MG-UNIT TABS Take 2,000 Units by mouth. No Known Allergies      Physical Examination     Gen, well-appearing  FSE today    trunk and extremities with scattered brown macules and papules   Trunk, arms with stuck-on dull-surfaced pinkish-tan crusted papules  Scars appear clear  No AK's  Face with numerous whitish cystic papule, several clustered at the lateral canthal areas  L shoulder with firm dermal pinkish-brown papule   No dermatitis  L wrist and back with stuck-on dull-surfaced tan/brown papules      Assessment and Plan     1.  Benign-appearing nevi and SK's and DF on the L

## 2023-03-13 ENCOUNTER — OFFICE VISIT (OUTPATIENT)
Dept: DERMATOLOGY | Age: 83
End: 2023-03-13

## 2023-03-13 DIAGNOSIS — L82.1 SEBORRHEIC KERATOSIS: ICD-10-CM

## 2023-03-13 DIAGNOSIS — D48.5 NEOPLASM OF UNCERTAIN BEHAVIOR OF SKIN: ICD-10-CM

## 2023-03-13 DIAGNOSIS — D22.9 MULTIPLE NEVI: Primary | ICD-10-CM

## 2023-03-13 DIAGNOSIS — Z85.828 HISTORY OF NONMELANOMA SKIN CANCER: ICD-10-CM

## 2023-03-13 DIAGNOSIS — L82.0 INFLAMED SEBORRHEIC KERATOSIS: ICD-10-CM

## 2023-03-13 DIAGNOSIS — L72.0 MILIA: ICD-10-CM

## 2023-03-13 DIAGNOSIS — L29.9 PRURITUS: ICD-10-CM

## 2023-03-13 DIAGNOSIS — D23.9 DERMATOFIBROMA: ICD-10-CM

## 2023-03-13 DIAGNOSIS — L57.0 AK (ACTINIC KERATOSIS): ICD-10-CM

## 2023-03-13 RX ORDER — TRIAMCINOLONE ACETONIDE 1 MG/G
CREAM TOPICAL
Qty: 450 G | Refills: 1 | Status: SHIPPED | OUTPATIENT
Start: 2023-03-13

## 2023-03-13 RX ORDER — CEPHALEXIN 500 MG/1
CAPSULE ORAL
COMMUNITY
Start: 2023-03-06

## 2023-03-13 NOTE — PROGRESS NOTES
[facility] Dermatology  Darlene Gonzalez MD  [phone]      Andreina Montana  1940    81 y.o. female     Date of Visit: 3/13/2023    Chief Complaint: f/u skin cancer, AK's, lesions  Chief Complaint   Patient presents with    Skin Exam    Pruritis     X 4 weeks scalp, torso     Last seen:   *had COVID in late fall  - hospitalized for 3+ weeks  *ANYA  from [place]     *had ? Stroke or TIA last month 2022 (aphasia)    History of Present Illness:    1. She is here for evaluation of multiple asx pigmented lesions on the trunk and extremities, present for many years; no change in size/shape/color of any lesions; no bleeding lesions. Firm lesion on the shoulder x many years - asx. 2. Hx of NMSC (BCC on the L medial cheek treated with Mohs and more recently SCC on the R jawline that was treated with Mohs by Dr. Garber Flair ) - Here for f/u and skin check. R shin - sup and nod BCC - curettage 3-2022  No concerns with previous sites or new lesions noted except for noted below. 3. Hx of AK. No new concerns. 4. Multiple facial milia. Some itchy in the past. Overall asx. One on the L brow is bothersome. 5. F/u dermatitis - R chest - resolved with TAC in the past.   Now c/o itchy all over x few mos -notes some small spots but some of these are SKs  Tried TAC and another OTC topical - didn't help much. Using Dove wash in the shower. Usually applies moisturizer every day - Hempz on the arms, legs and chest.  Newest medications - statin and eliquis but on both for many mos. 6. Few concerning lesions:  L shin - 2 pink spots + central chest - 2 irritated spots    Bx's ():  Chest-actinic keratosis  Lt post thigh-benign    Derm Hx:  Hx of intermittent eyelid pruritus and scaling, previously diagnosed as dermatitis - cleared with elidel in the past.     Friend of Jazmin Trujillo and   Courtney Villagran.   Her grandson was killed in a car wreck in  ( football player).    Review of Systems:  Gen: Feels well, good sense of health.  Skin: No changing moles or lesions.  No new rashes.    Past Medical History, Family History, Surgical History, Medications and Allergies reviewed.    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        Past Surgical History:   Procedure Laterality Date    BLADDER SURGERY  08/2016    restructured bladder     MOHS SURGERY  01/10/2019    right jawline anterior inferior       Outpatient Medications Marked as Taking for the 3/13/23 encounter (Office Visit) with Lisa Uribe MD   Medication Sig Dispense Refill    cephALEXin (KEFLEX) 500 MG capsule       ELIQUIS 5 MG TABS tablet       atorvastatin (LIPITOR) 40 MG tablet Take 40 mg by mouth daily      Cholecalciferol 75 MCG (3000 UT) TABS Take 2,000 Units by mouth daily      levothyroxine (SYNTHROID) 125 MCG tablet       triamcinolone (KENALOG) 0.1 % cream Apply to affected area BID PRN flares 60 g 0    calcium carbonate (OSCAL) 500 MG TABS tablet Take 500 mg by mouth daily      Omega-3 Fatty Acids (FISH OIL) 1000 MG CAPS Take 3,000 mg by mouth 3 times daily       venlafaxine (EFFEXOR-XR) 150 MG XR capsule Take 225 mg by mouth daily Indications: Per PT 3/14/2016      Diclofenac Potassium (CAMBIA PO) Take by mouth       SYNTHROID 112 MCG tablet       trimethoprim (TRIMPEX) 100 MG tablet       ALPRAZolam (XANAX) 0.5 MG tablet Take 0.5 mg by mouth nightly as needed.       Calcium Carbonate-Vit D-Min 600-200 MG-UNIT TABS Take 2,000 Units by mouth.         No Known Allergies      Physical Examination     Gen, well-appearing  FSE today    trunk and extremities with scattered brown macules and papules   Trunk, arms with stuck-on dull-surfaced pinkish-tan  crusted papules  Scars appear clear  No AK's  Face with numerous whitish cystic papule, several clustered at the lateral [place]hal areas  L shoulder with firm dermal pinkish-brown papule   Focal mildly dry skin but otw no significant dermatitis  Scalp clear  L shin - 2 pink shiny macules  - site A - anterior lateral L shin, superior   - Site B - lateral L shin, inferior  - central chest with 2 waxy medium and dark brown stuck-on crusted papule      Assessment and Plan     1. Benign-appearing nevi and SK's and DF on the L shoulder  - educ re si/sx/ABCD's of MM   educ sun protection - OTC sunscreen with SPF 30-50+ recommended and reviewed usage  encouraged skin check yearly (sooner if indicated), self checks     2. Hx of NMSC, no signs recurrence  - educ sun protection   encouraged skin check yearly (sooner if indicated), self checks    3. AK - clear today  - sun protection as above    4. Milia - reassured  - ed 200 for removal of multiple elective  - 1 extracted near the L brow w 11 blade and gentle pressure - no extra charge    5. Pruritus - mainly sx and mild xerosis, very mild if any dermatitis today  - 3-2023 - CBC wnl, renal, liver wnl, TSH wnl  - consider SPEP if persistent over the coming mos  - ed DSC, sarna lotion  - TAC all over pruritic area and not just spot trx  - call if no better over the next month and will add lab and consider re-eval    6. L shin - r/o BCC x 2  - site A - anterior lateral L shin, superior   - Site B - lateral L shin, inferior  - 2 Shave biopsy performed after verbal consent obtained. Patient educated regarding risk of bleeding, infection, scar and educated on wound care. Skin cleansed with alcohol pad and site anesthetized with lido + epi. Aluminum chloride applied to site for hemostasis. Petrolatum ointment and bandage applied. Specimen bottle labeled with patient information and site and specimen sent to dermpath.       6b. ISK - central chest x 2 - 8-9 mm each  - 2 Shave removal performed after verbal consent obtained. Patient educated regarding risk of bleeding, infection, scar and educated on wound care. Skin cleansed with alcohol pad and site anesthetized with lido + epi. Aluminum chloride applied to site for hemostasis. Petrolatum ointment and bandage applied. Specimen bottle labeled with patient information and site and specimen sent to [facility]. F/u yearly.

## 2023-03-13 NOTE — PATIENT INSTRUCTIONS
[email or web address]    [facility]      Biopsy Wound Care Instructions    Keep the bandage in place for 24 hours. Cleanse the wound with mild soapy water daily  Gently dry the area. Apply Vaseline or petroleum jelly to the wound using a cotton tipped applicator. Cover with a clean bandage. Repeat this process until the biopsy site is healed. If you had stitches placed, continue treating the site until the stitches are removed. Remember to make an appointment to return to have your stitches removed by our staff. You may shower and bathe as usual.       ** Biopsy results generally take around 7 business days to come back. If you have not heard from us by then, please call the office at [phone]. *Please note that biopsy results are released to both the patient and physician at the same time in [address] Please allow time for your physician to review the results. One of our staff members will reach out to you with the results and plan. DRY SKIN CARE    1. Do not take more than 1 shower or bath each day. Try to spend 10 minutes or less in the shower/bath. Avoid hot showers as this can damage the skin and make the dryness worse. 2. Use a moisturizing or mild soap such Dove (for sensitive skin), Cetaphil (Cleansing Bar,Gentle Body Wash, Restoraderm Soothing Wash), CeraVe Hydrating Body Wash, Eucerin Skin Calming Body Wash, or Aveeno Daily Moisturizing Body Wash. Antibacterial soaps can be too drying. 3. Use soap only on limited areas (face, underarms, groin and buttocks). Try to avoid using soap on the arms, legs, trunk and back. 4. After showering, pat dry with a towel and generously apply a thick moisturizer all over. Use a moisturizer every day even if you are not showering that particular day. 5. If you are able, apply the moisturizer a second time during the day as well.      6. If a prescription cream or ointment has been ordered for you, apply the prescription medication to affected areas after your bath/shower while the skin is still damp, then apply the moisturizer to the remainder of the skin. 7. When it comes to moisturizers, the thicker the better. Ointments (such as vaseline) are thicker than creams, and creams are thicker than lotions. Suggested over-the-counter moisturizers include:    CeraVe Lotion or Cream  Cetaphil Lotion or Cream  Eucerin Advanced Repair Cream, Advanced Repair Lotion, Eczema Relief Cream or Intensive Repair Lotion.    Curel Ultra Healing   Vanicream Moisturizing Skin Cream  Sarna lotion - can help with itching

## 2023-03-17 LAB — DERMATOLOGY PATHOLOGY REPORT: NORMAL

## 2023-04-24 ENCOUNTER — TELEPHONE (OUTPATIENT)
Dept: DERMATOLOGY | Age: 83
End: 2023-04-24

## 2023-04-24 NOTE — TELEPHONE ENCOUNTER
Patient called to scheduled an appointment for multiple tiny cysts on her face. Patient saw an  and just doesn't trust their judgement.      Please advise on scheduling (30 procedure-cosmetic)

## 2023-04-24 NOTE — TELEPHONE ENCOUNTER
Pt calling to speak to [facility] regarding appt to remove small cyst on her face pls return call back @ [phone]

## 2023-04-27 NOTE — TELEPHONE ENCOUNTER
Yes, just needs a 30 min appt for extraction of milia - will let her sit w LMX for ~20 min and then do extractions.

## 2023-06-19 NOTE — PROGRESS NOTES
Routine vitals stable. Scheduled medications given. Pt tolerated second bag of convalescent plasma well. Pt denies any further needs at this time. Call light within reach. Will continue to monitor. no

## 2023-07-03 ENCOUNTER — PROCEDURE VISIT (OUTPATIENT)
Dept: DERMATOLOGY | Age: 83
End: 2023-07-03

## 2023-07-03 DIAGNOSIS — L72.0 MILIA: Primary | ICD-10-CM

## 2023-07-03 NOTE — PROGRESS NOTES
[facility] Dermatology  Jenna Lerma MD  [phone]      Erna Inman  1940    81 y.o. female     Date of Visit: 7/3/2023    Chief Complaint: f/u skin cancer, AK's, lesions  Chief Complaint   Patient presents with    Follow-up     Last seen:   *had COVID in late fall  - hospitalized for 3+ weeks  *ANYA  from [facility]     *had ? Stroke or TIA last month 2022 (aphasia)    History of Present Illness:    1. She is here for evaluation of multiple asx pigmented lesions on the trunk and extremities, present for many years; no change in size/shape/color of any lesions; no bleeding lesions. Firm lesion on the shoulder x many years - asx. 2. Hx of NMSC (BCC on the L medial cheek treated with Mohs and more recently SCC on the R jawline that was treated with Mohs by Dr. Angela Simons ) - Here for f/u and skin check. R shin - sup and nod BCC - curettage 3-2022  No concerns with previous sites or new lesions noted except for noted below. 3. Hx of AK. No new concerns. 4. Multiple facial milia. Some itchy in the past. Overall asx. One on the L brow is bothersome. 5. F/u dermatitis - R chest - resolved with TAC in the past.   Now c/o itchy all over x few mos -notes some small spots but some of these are SKs  Tried TAC and another OTC topical - didn't help much. Using Dove wash in the shower. Usually applies moisturizer every day - Hempz on the arms, legs and chest.  Newest medications - statin and eliquis but on both for many mos. 6. Few concerning lesions:  L shin - 2 pink spots + central chest - 2 irritated spots    Bx's ():  Chest-actinic keratosis  Lt post thigh-benign    Derm Hx:  Hx of intermittent eyelid pruritus and scaling, previously diagnosed as dermatitis - cleared with elidel in the past.     Friend of Lo Souza and   Bravo Cruz. Her grandson was killed in a car wreck in  ( football player).     Review of Systems:  Gen:

## 2023-07-03 NOTE — PROGRESS NOTES
[facility] Dermatology  Onesimo Redding MD  [phone]      Myles Hernandez  1940    81 y.o. female     Date of Visit: 7/3/2023    Chief Complaint: milia  Chief Complaint   Patient presents with    Follow-up     Last seen: 3-2023  *had [place] in late 2020 - hospitalized for 3+ weeks  *ANYA  from [place]     *had ? Stroke or TIA 2022 (aphasia)    History of Present Illness:    Here for elective removal/extraction of facial milia. Multiple facial milia. Some itchy in the past. Overall asx. Had FSE 3-2023  Hx of NMSC (BCC on the L medial cheek treated with Mohs and more recently SCC on the R jawline that was treated with Mohs by Dr. Caitie Aburto )  R shin - sup and nod BCC - curettage 3-2022    Bx's ():  Chest-actinic keratosis  Lt post thigh-benign    Derm Hx:  Hx of intermittent eyelid pruritus and scaling, previously diagnosed as dermatitis - cleared with elidel in the past.     Friend of Viviana Augustine and   Keyshawn Blount. Her grandson was killed in a car wreck in  ( football player). Review of Systems:  Gen: Feels well, good sense of health. Past Medical History, Family History, Surgical History, Medications and Allergies reviewed. 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         Past Surgical History:   Procedure Laterality Date    BLADDER SURGERY  2016    restructured bladder     MOHS SURGERY  01/10/2019    right jawline anterior inferior       Outpatient Medications Marked as Taking for the 7/3/23 encounter (Procedure visit) with Bk Cedillo MD   Medication Sig Dispense Refill    cephALEXin (KEFLEX) 500 MG capsule       triamcinolone (KENALOG) 0.1 % cream Apply to affected area BID PRN flares

## 2023-08-21 ENCOUNTER — PROCEDURE VISIT (OUTPATIENT)
Dept: DERMATOLOGY | Age: 83
End: 2023-08-21

## 2023-08-21 DIAGNOSIS — L72.0 MILIA: Primary | ICD-10-CM

## 2023-08-21 PROCEDURE — DM00530 MILIA EXTRACTION FIRST (NO ANESTHESIA): Performed by: DERMATOLOGY

## 2023-08-21 NOTE — PROGRESS NOTES
[facility] Dermatology  Jeet Rubio MD  [phone]      Radha Price  1940    81 y.o. female     Date of Visit: 2023    Chief Complaint: milia  Chief Complaint   Patient presents with    Follow-up     Did well with last treatment and healed fast.      Last seen:   *had COVID in late 2020 - hospitalized for 3+ weeks  *ANYA  from [facility]     *had ? Stroke or TIA 2022 (aphasia)    *dog      History of Present Illness:    Here for elective removal/extraction of facial milia. Multiple facial milia. Some itchy in the past. Overall asx. S/p extraction of numerous lesions  - did well and healed easily. Family wedding - 10-28-23    Had FSE 3-2023  Hx of NMSC (BCC on the L medial cheek treated with Mohs and more recently SCC on the R jawline that was treated with Mohs by Dr. Isaias Tran )  R shin - sup and nod BCC - curettage 3-2022    Bx's ():  Chest-actinic keratosis  Lt post thigh-benign    Derm Hx:  Hx of intermittent eyelid pruritus and scaling, previously diagnosed as dermatitis - cleared with elidel in the past.     Friend of Hany Morin and   Bhakti Boogie. Her grandson was killed in a car wreck in  ( football player). Review of Systems:  Gen: Feels well, good sense of health. Past Medical History, Family History, Surgical History, Medications and Allergies reviewed. 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         Past Surgical History:   Procedure Laterality Date    BLADDER SURGERY  2016    restructured bladder     MOHS SURGERY  01/10/2019    right jawline anterior inferior       Outpatient Medications Marked as Taking for the 23 encounter (Procedure visit) with

## 2023-11-07 ENCOUNTER — OFFICE VISIT (OUTPATIENT)
Dept: DERMATOLOGY | Age: 83
End: 2023-11-07

## 2023-11-07 DIAGNOSIS — D22.9 MULTIPLE NEVI: Primary | ICD-10-CM

## 2023-11-07 DIAGNOSIS — L82.0 INFLAMED SEBORRHEIC KERATOSIS: ICD-10-CM

## 2023-11-07 DIAGNOSIS — D23.9 DERMATOFIBROMA: ICD-10-CM

## 2023-11-07 DIAGNOSIS — L29.9 PRURITUS: ICD-10-CM

## 2023-11-07 DIAGNOSIS — L72.0 MILIA: ICD-10-CM

## 2023-11-07 DIAGNOSIS — L82.1 SEBORRHEIC KERATOSIS: ICD-10-CM

## 2023-11-07 DIAGNOSIS — L57.0 AK (ACTINIC KERATOSIS): ICD-10-CM

## 2023-11-07 DIAGNOSIS — Z85.828 HISTORY OF NONMELANOMA SKIN CANCER: ICD-10-CM

## 2023-11-07 NOTE — PROGRESS NOTES
[facility] Dermatology  Anaya Pelletier MD  [phone]      Sumeet Collado  1940    81 y.o. female     Date of Visit: 2023    Chief Complaint: f/u skin cancer, AK's, lesions  Chief Complaint   Patient presents with    Skin Lesion     6 mo FSE  F/u left shin- AK 3/2023     Last seen:   *had COVID in late fall  - hospitalized for 3+ weeks  *ANYA  from [facility]     *had ? Stroke or TIA in 2022 (aphasia)    History of Present Illness:    1. She is here for evaluation of multiple asx pigmented lesions on the trunk and extremities, present for many years; no change in size/shape/color of any lesions; no bleeding lesions. Firm lesion on the shoulder x many years - asx. 2. Hx of NMSC (BCC on the L medial cheek treated with Mohs and more recently SCC on the R jawline that was treated with Mohs by Dr. Lisa Saenz ) - Here for f/u and skin check. R shin - sup and nod BCC - curettage 3-2022  No concerns with previous sites or new lesions noted except for noted below. 3. Hx of AK. Few  new concerns - face. 4. Multiple facial milia. Much better with elective extraction of multiple over the past few visits. 5. F/u dermatitis - R chest - resolved with TAC in the past.   Itchy all over at times x several mos -notes some small spots but some of these are SKs. Tolerable. Tried TAC and another OTC topical - didn't help much. Using Dove wash in the shower. Usually applies moisturizer every day - Hempz previously - on the arms, legs and chest.  Newest medications - statin and eliquis but on both for many mos. 6. She has a few irritated crusted lesions on the back and neck. Bx's ():  Chest-actinic keratosis  Lt post thigh-benign    Derm Hx:  Hx of intermittent eyelid pruritus and scaling, previously diagnosed as dermatitis - cleared with elidel in the past.     Friend of José Miguel Joshi and   Memo Reyes.   Her grandson was killed in a car wreck in

## 2024-07-30 ENCOUNTER — OFFICE VISIT (OUTPATIENT)
Dept: DERMATOLOGY | Age: 84
End: 2024-07-30

## 2024-07-30 DIAGNOSIS — L72.0 MILIA: ICD-10-CM

## 2024-07-30 DIAGNOSIS — D23.9 DERMATOFIBROMA: ICD-10-CM

## 2024-07-30 DIAGNOSIS — L57.0 AK (ACTINIC KERATOSIS): ICD-10-CM

## 2024-07-30 DIAGNOSIS — Z85.828 HISTORY OF NONMELANOMA SKIN CANCER: ICD-10-CM

## 2024-07-30 DIAGNOSIS — L82.0 INFLAMED SEBORRHEIC KERATOSIS: ICD-10-CM

## 2024-07-30 DIAGNOSIS — L82.1 SEBORRHEIC KERATOSIS: ICD-10-CM

## 2024-07-30 DIAGNOSIS — L29.9 PRURITUS: ICD-10-CM

## 2024-07-30 DIAGNOSIS — D22.9 MULTIPLE NEVI: Primary | ICD-10-CM

## 2024-07-30 RX ORDER — TRIAMCINOLONE ACETONIDE 1 MG/G
CREAM TOPICAL
Qty: 450 G | Refills: 1 | Status: SHIPPED | OUTPATIENT
Start: 2024-07-30

## 2024-07-30 NOTE — PROGRESS NOTES
cystic papules  L shoulder with firm dermal pinkish-brown papule   Focal mildly dry skin but otw no significant dermatitis  Scalp clear  Neck and R bra band with stuck-on dull-surfaced brown papules      Assessment and Plan     1. Benign-appearing nevi and SK's and DF on the L shoulder  - Monitor for ABCD's of MM and si/sx of NMSC  Continue sun protection - OTC sunscreen with SPF 30-50+ recommended and reviewed usage  Encouraged skin check yearly (sooner if indicated), self checks     2. Hx of NMSC, no signs recurrence  - educ sun protection   encouraged skin check yearly (sooner if indicated), self checks    3. AK - clear today  - sun protection and monitoring as above    4. Milia - reassured  - rtn for elective removal as desired    5. Pruritus - mainly sx and mild xerosis, very mild if any dermatitis today - stable  - 3-2023 - CBC wnl, renal, liver wnl, TSH wnl  - consider SPEP if worsening but seems to be related to xerosis and SK's  - ed DSC, sarna lotion  - TAC all over pruritic area and not just spot trx  - call if worsening    6. ISK's - neck/R lateral bra band  - 16 lesion(s) treated with liquid nitrogen with cryac or swab.  Treated with 2 cycles for 1-5 seconds each after consent from patient.   Patient educated on risk of blister, hypopigmentation/scar and wound care.  Tolerated well.     Milia removed in the past; grandson's wedding was October 2023    F/u yearly.

## 2024-07-30 NOTE — PATIENT INSTRUCTIONS
Cryosurgery (Freezing) Wound Care Instructions    AFTER THE PROCEDURE:   You will notice swelling and redness around the site. This is normal.   You may experience a sharp or sore feeling for the next several days. For this discomfort, you may take acetaminophen (Tylenol©).   A blister may develop at the treated area, sometimes as soon as by the end of the day. After several days, the blister will subside and a scab will form.   If the area is bumped or traumatized during the first few days following freezing, you may develop bleeding into the blister, forming a blood blister. This is nothing to be alarmed about.  If the blister is tense, uncomfortable, or much larger than the site that was frozen, you may pop the blister along its edge with a sterile needle (boiled, heated under a flame, or cleaned with alcohol) to allow the fluid to drain out. If the blister does not bother you, no treatment is needed.   Do NOT peel off the top of the blister roof. It will act as a dressing on top of your wound.   WOUND CARE:   You may shower or bathe as usual, but avoid scrubbing the areas that have been frozen.    Cleanse the site twice a day with mild soapy water, and then apply a thin film of white petrolatum (Vaseline©).   You do not need to cover the area, but can if you prefer.   Do NOT allow the site to become dry or crusted, or attempt to dry it out with rubbing alcohol or hydrogen peroxide.   Continue this regimen until the area is pink and healed. Depending on the size and location of your cryosurgery site, healing may take 2 to 4 weeks.   The area may continue to be pink for several weeks, and over the next few months may become darker or lighter than the surrounding skin. This may be a permanent change.

## 2025-01-28 ENCOUNTER — OFFICE VISIT (OUTPATIENT)
Age: 85
End: 2025-01-28
Payer: MEDICARE

## 2025-01-28 DIAGNOSIS — L57.0 AK (ACTINIC KERATOSIS): ICD-10-CM

## 2025-01-28 DIAGNOSIS — L82.1 SEBORRHEIC KERATOSIS: ICD-10-CM

## 2025-01-28 DIAGNOSIS — L81.4 LENTIGINES: ICD-10-CM

## 2025-01-28 DIAGNOSIS — Z85.828 HISTORY OF NONMELANOMA SKIN CANCER: ICD-10-CM

## 2025-01-28 DIAGNOSIS — D23.9 DERMATOFIBROMA: ICD-10-CM

## 2025-01-28 DIAGNOSIS — L30.9 DERMATITIS: ICD-10-CM

## 2025-01-28 DIAGNOSIS — L82.0 INFLAMED SEBORRHEIC KERATOSIS: ICD-10-CM

## 2025-01-28 DIAGNOSIS — D22.9 MULTIPLE NEVI: Primary | ICD-10-CM

## 2025-01-28 PROCEDURE — G8421 BMI NOT CALCULATED: HCPCS | Performed by: DERMATOLOGY

## 2025-01-28 PROCEDURE — 1036F TOBACCO NON-USER: CPT | Performed by: DERMATOLOGY

## 2025-01-28 PROCEDURE — 1123F ACP DISCUSS/DSCN MKR DOCD: CPT | Performed by: DERMATOLOGY

## 2025-01-28 PROCEDURE — G8400 PT W/DXA NO RESULTS DOC: HCPCS | Performed by: DERMATOLOGY

## 2025-01-28 PROCEDURE — G8427 DOCREV CUR MEDS BY ELIG CLIN: HCPCS | Performed by: DERMATOLOGY

## 2025-01-28 PROCEDURE — 17000 DESTRUCT PREMALG LESION: CPT | Performed by: DERMATOLOGY

## 2025-01-28 PROCEDURE — 17110 DESTRUCTION B9 LES UP TO 14: CPT | Performed by: DERMATOLOGY

## 2025-01-28 PROCEDURE — 1090F PRES/ABSN URINE INCON ASSESS: CPT | Performed by: DERMATOLOGY

## 2025-01-28 PROCEDURE — 1159F MED LIST DOCD IN RCRD: CPT | Performed by: DERMATOLOGY

## 2025-01-28 PROCEDURE — 1160F RVW MEDS BY RX/DR IN RCRD: CPT | Performed by: DERMATOLOGY

## 2025-01-28 PROCEDURE — 99214 OFFICE O/P EST MOD 30 MIN: CPT | Performed by: DERMATOLOGY

## 2025-01-28 RX ORDER — TRIAMCINOLONE ACETONIDE 1 MG/G
CREAM TOPICAL
Qty: 450 G | Refills: 2 | Status: SHIPPED | OUTPATIENT
Start: 2025-01-28

## 2025-01-28 NOTE — PROGRESS NOTES
[facility] Dermatology  Lisa Uribe MD  [phone]      Rhonda Smalls  1940    84 y.o. female     Date of Visit: 2025    Last seen:     Chief Complaint:   Chief Complaint   Patient presents with    Skin Exam     *had COVID in late 2020 - hospitalized for 3+ weeks  *ANYA  from [place]      *had ? Stroke or TIA in 2022 (aphasia)     History of Present Illness  The patient is an 84-year-old female     1. Here for evaluation of multiple nevi, seborrheic keratoses, lentigines, and a dermatofibroma on the left shoulder.   The patient has observed an increase in the size of a non-painful lesion on her right shoulder.    2. She is also here for follow-up regarding nonmelanoma skin cancer.  No concerns with previous sites    3. F/u actinic keratoses -she has a new lesion in the right preauricular area.    4. Hx of multiple facial milia.  No new concerns    5. F/u dermatitis.  - The patient reports experiencing pruritus localized to the left calf for the past few weeks.  - no associated pain or generalized leg swelling.  - The pruritus was more severe two weeks ago but has since subsided.  - She has been using triamcinolone and requests a refill of this medication.    6. A lesion located under her bra band causes significant discomfort due to friction.  She also has an area treated lesion on the right cheek.    Supplemental information: The patient underwent a mammogram yesterday.    MEDICATIONS  Current: triamcinolone      Hx NMSC   - BCC on the L medial cheek treated with Mohs   - SCC on the R jawline that was treated with Mohs by Dr. Chaudhry   - R shin - sup and nod BCC - curettage 3-2022    Multiple milia removed in the past.  Hx of intermittent eyelid pruritus and scaling, previously diagnosed as dermatitis - cleared with karen.     Friend of Geraldine Pride and   Jaxson.  Her grandson was killed in a car wreck in  ( football player).    Review

## 2025-07-28 ENCOUNTER — OFFICE VISIT (OUTPATIENT)
Age: 85
End: 2025-07-28

## 2025-07-28 DIAGNOSIS — L57.0 AK (ACTINIC KERATOSIS): ICD-10-CM

## 2025-07-28 DIAGNOSIS — L81.4 LENTIGINES: ICD-10-CM

## 2025-07-28 DIAGNOSIS — Z85.828 HISTORY OF NONMELANOMA SKIN CANCER: ICD-10-CM

## 2025-07-28 DIAGNOSIS — L30.9 DERMATITIS: ICD-10-CM

## 2025-07-28 DIAGNOSIS — D22.9 MULTIPLE NEVI: Primary | ICD-10-CM

## 2025-07-28 DIAGNOSIS — L29.9 PRURITUS: ICD-10-CM

## 2025-07-28 DIAGNOSIS — L82.0 INFLAMED SEBORRHEIC KERATOSIS: ICD-10-CM

## 2025-07-28 DIAGNOSIS — L82.1 SEBORRHEIC KERATOSIS: ICD-10-CM

## 2025-07-28 RX ORDER — FAMOTIDINE 40 MG/1
TABLET, FILM COATED ORAL
COMMUNITY
Start: 2025-07-21

## 2025-07-28 RX ORDER — MOMETASONE FUROATE 1 MG/ML
LOTION TOPICAL 2 TIMES DAILY
COMMUNITY
Start: 2025-07-21

## 2025-07-28 NOTE — PATIENT INSTRUCTIONS
Sarna lotion - over the counter anti-itch lotion      Cryosurgery (Freezing) Wound Care Instructions    AFTER THE PROCEDURE:   You will notice swelling and redness around the site. This is normal.   You may experience a sharp or sore feeling for the next several days. For this discomfort, you may take acetaminophen (Tylenol©).   A blister may develop at the treated area, sometimes as soon as by the end of the day. After several days, the blister will subside and a scab will form.   If the area is bumped or traumatized during the first few days following freezing, you may develop bleeding into the blister, forming a blood blister. This is nothing to be alarmed about.  If the blister is tense, uncomfortable, or much larger than the site that was frozen, you may pop the blister along its edge with a sterile needle (boiled, heated under a flame, or cleaned with alcohol) to allow the fluid to drain out. If the blister does not bother you, no treatment is needed.   Do NOT peel off the top of the blister roof. It will act as a dressing on top of your wound.   WOUND CARE:   You may shower or bathe as usual, but avoid scrubbing the areas that have been frozen.    Cleanse the site twice a day with mild soapy water, and then apply a thin film of white petrolatum (Vaseline©).   You do not need to cover the area, but can if you prefer.   Do NOT allow the site to become dry or crusted, or attempt to dry it out with rubbing alcohol or hydrogen peroxide.   Continue this regimen until the area is pink and healed. Depending on the size and location of your cryosurgery site, healing may take 2 to 4 weeks.   The area may continue to be pink for several weeks, and over the next few months may become darker or lighter than the surrounding skin. This may be a permanent change.        Biopsy Wound Care Instructions    Keep the bandage in place for 24 hours.   Cleanse the wound with mild soapy water daily  Gently dry the area.  Apply

## 2025-07-28 NOTE — PROGRESS NOTES
cleared with karen.     Friend of Geraldine Pride and   Jaxson.  Her grandson was killed in a car wreck in  ( football player).    Review of Systems:  Gen: Feels well, good sense of health.  No F/C.  Skin: No changing moles or lesions.  No new rashes.    Past Medical History, Family History, Surgical History, Medications and Allergies reviewed.    Outpatient Medications Marked as Taking for the 25 encounter (Office Visit) with Lisa Uribe MD   Medication Sig Dispense Refill    famotidine (PEPCID) 40 MG tablet TAKE ONE TABLET BY MOUTH EVERY MORNING AND TAKE 1 TABLET BY MOUTH AT BEDTIME      mometasone (ELOCON) 0.1 % lotion Apply topically 2 times daily      triamcinolone (KENALOG) 0.1 % cream Apply to affected area BID PRN flares 450 g 2    ELIQUIS 5 MG TABS tablet       atorvastatin (LIPITOR) 40 MG tablet Take 1 tablet by mouth daily      Cholecalciferol 75 MCG (3000 UT) TABS Take 2,000 Units by mouth daily      levothyroxine (SYNTHROID) 125 MCG tablet       pantoprazole (PROTONIX) 40 MG tablet Take 1 tablet by mouth daily      calcium carbonate (OSCAL) 500 MG TABS tablet Take 1 tablet by mouth daily      venlafaxine (EFFEXOR-XR) 150 MG XR capsule Take 225 mg by mouth daily Indications: Per PT 3/14/2016      Calcium Carbonate-Vit D-Min 600-200 MG-UNIT TABS Take 2,000 Units by mouth.       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      Past Surgical History:   Procedure Laterality Date    BLADDER SURGERY  2016    restructured bladder     MOHS SURGERY  01/10/2019    right jawline anterior inferior       Physical Examination

## 2025-07-30 LAB — DERMATOLOGY PATHOLOGY REPORT: ABNORMAL
